# Patient Record
Sex: MALE | Race: BLACK OR AFRICAN AMERICAN | NOT HISPANIC OR LATINO | Employment: FULL TIME | URBAN - METROPOLITAN AREA
[De-identification: names, ages, dates, MRNs, and addresses within clinical notes are randomized per-mention and may not be internally consistent; named-entity substitution may affect disease eponyms.]

---

## 2017-01-10 ENCOUNTER — HOSPITAL ENCOUNTER (EMERGENCY)
Facility: OTHER | Age: 32
Discharge: HOME | End: 2017-01-10
Attending: EMERGENCY MEDICINE
Payer: COMMERCIAL

## 2017-01-10 VITALS
TEMPERATURE: 98 F | HEIGHT: 66 IN | SYSTOLIC BLOOD PRESSURE: 146 MMHG | DIASTOLIC BLOOD PRESSURE: 76 MMHG | WEIGHT: 170 LBS | RESPIRATION RATE: 18 BRPM | HEART RATE: 57 BPM | OXYGEN SATURATION: 98 % | BODY MASS INDEX: 27.32 KG/M2

## 2017-01-10 DIAGNOSIS — R19.7 DIARRHEA, UNSPECIFIED TYPE: Primary | ICD-10-CM

## 2017-01-10 DIAGNOSIS — R10.84 GENERALIZED ABDOMINAL PAIN: ICD-10-CM

## 2017-01-10 PROCEDURE — 25000003 PHARM REV CODE 250: Performed by: EMERGENCY MEDICINE

## 2017-01-10 PROCEDURE — 96374 THER/PROPH/DIAG INJ IV PUSH: CPT

## 2017-01-10 PROCEDURE — 81003 URINALYSIS AUTO W/O SCOPE: CPT

## 2017-01-10 PROCEDURE — 85025 COMPLETE CBC W/AUTO DIFF WBC: CPT

## 2017-01-10 PROCEDURE — 96361 HYDRATE IV INFUSION ADD-ON: CPT

## 2017-01-10 PROCEDURE — 82150 ASSAY OF AMYLASE: CPT

## 2017-01-10 PROCEDURE — 63600175 PHARM REV CODE 636 W HCPCS: Performed by: EMERGENCY MEDICINE

## 2017-01-10 PROCEDURE — 25500020 PHARM REV CODE 255: Performed by: EMERGENCY MEDICINE

## 2017-01-10 PROCEDURE — 99284 EMERGENCY DEPT VISIT MOD MDM: CPT | Mod: 25

## 2017-01-10 PROCEDURE — 80053 COMPREHEN METABOLIC PANEL: CPT

## 2017-01-10 RX ORDER — ONDANSETRON 4 MG/1
8 TABLET, FILM COATED ORAL EVERY 8 HOURS PRN
Qty: 14 TABLET | Refills: 0 | Status: SHIPPED | OUTPATIENT
Start: 2017-01-10 | End: 2017-04-05

## 2017-01-10 RX ORDER — MORPHINE SULFATE 10 MG/ML
5 INJECTION INTRAMUSCULAR; INTRAVENOUS; SUBCUTANEOUS
Status: COMPLETED | OUTPATIENT
Start: 2017-01-10 | End: 2017-01-10

## 2017-01-10 RX ORDER — HYOSCYAMINE SULFATE 0.125 MG
125 TABLET ORAL EVERY 4 HOURS PRN
Qty: 10 TABLET | Refills: 0 | Status: SHIPPED | OUTPATIENT
Start: 2017-01-10 | End: 2017-08-29 | Stop reason: ALTCHOICE

## 2017-01-10 RX ADMIN — IOHEXOL 100 ML: 350 INJECTION, SOLUTION INTRAVENOUS at 02:01

## 2017-01-10 RX ADMIN — SODIUM CHLORIDE 1000 ML: 0.9 INJECTION, SOLUTION INTRAVENOUS at 01:01

## 2017-01-10 RX ADMIN — MORPHINE SULFATE 5 MG: 10 INJECTION INTRAVENOUS at 01:01

## 2017-01-10 NOTE — ED PROVIDER NOTES
"Encounter Date: 1/10/2017       History     Chief Complaint   Patient presents with    Diarrhea     Review of patient's allergies indicates:   Allergen Reactions    Ibuprofen Hives     HPI Comments: 30 Y/O AAM PRESENTS WITH DIFFUSE ABD CRAMPING AND WATERY DIARRHEA x 2 DAYS.   PT DESCRIBES PAIN AS CRAMPING IN NATURE, DIFFUSE AND NON-RADIATING, 8/10.  + DECREASED PO INTAKE.  PT REPORTS "I AM ABLE TO EAT. IT JUST GOES RIGHT THROUGH ME."  PT REPORTS HE ATE [employer] FA[employer] Sunday AT 1330 AND SINCE Sunday EVENING HE HAS HAD ABD CRAMPING AND WATERY DIARRHEA.  NO N/V.  NO FEVER/CHILLS.  NO KNOWN SICK CONTACTS.  NO BACK PAIN.  NO HX OF PREVIOUS SIMILAR EPISODE IN THE PAST.  NORMAL UO.  NO FLANK PAIN. NO EXACERBATING OR RELIEVING FACTORS.  DIARRHEA IS NON-BLOODY, NO FOUL ODOR.      The history is provided by the patient. No  was used.     Past Medical History   Diagnosis Date    Asthma     Hernia      No past medical history pertinent negatives.  Past Surgical History   Procedure Laterality Date    Hernia repair       Family History   Problem Relation Age of Onset    Diabetes Mother     Hypertension Mother     Carpal tunnel syndrome Father     Liver disease Father     No Known Problems Sister     No Known Problems Brother     Allergies Daughter     Asthma Daughter     No Known Problems Son     No Known Problems Sister     No Known Problems Sister     No Known Problems Brother     No Known Problems Brother      Social History   Substance Use Topics    Smoking status: Never Smoker    Smokeless tobacco: Never Used    Alcohol use No     Review of Systems   Constitutional: Negative for chills, fatigue and fever.   HENT: Negative for congestion and sore throat.    Eyes: Negative for photophobia and visual disturbance.   Respiratory: Negative for cough and shortness of breath.    Cardiovascular: Negative for chest pain and palpitations.   Gastrointestinal: Positive for abdominal pain and " diarrhea. Negative for abdominal distention, anal bleeding, blood in stool, constipation, nausea and vomiting.   Endocrine: Negative for polydipsia and polyphagia.   Genitourinary: Negative for difficulty urinating, dysuria, flank pain, frequency, hematuria and urgency.   Musculoskeletal: Negative for back pain and neck pain.   Skin: Negative for pallor and rash.   Allergic/Immunologic: Negative for immunocompromised state.   Neurological: Negative for dizziness and headaches.   Hematological: Does not bruise/bleed easily.   Psychiatric/Behavioral: Negative for agitation and confusion.   All other systems reviewed and are negative.      Physical Exam   Initial Vitals   BP Pulse Resp Temp SpO2   01/10/17 1257 01/10/17 1257 01/10/17 1257 01/10/17 1257 01/10/17 1257   142/82 58 16 98.1 °F (36.7 °C) 96 %     Physical Exam    Nursing note and vitals reviewed.  Constitutional: He appears well-developed and well-nourished. He is not diaphoretic. No distress.   HENT:   Head: Normocephalic and atraumatic.   Mouth/Throat: Oropharynx is clear and moist.   Eyes: Conjunctivae and EOM are normal. Pupils are equal, round, and reactive to light. No scleral icterus.   Neck: Normal range of motion. Neck supple.   Cardiovascular: Normal rate, regular rhythm and normal heart sounds. Exam reveals no gallop and no friction rub.    No murmur heard.  Pulmonary/Chest: Breath sounds normal. No respiratory distress. He has no wheezes. He has no rhonchi. He has no rales. He exhibits no tenderness.   Abdominal: Soft. Bowel sounds are normal. He exhibits no distension, no ascites and no mass. There is no hepatosplenomegaly. There is tenderness. There is no rebound, no guarding, no tenderness at McBurney's point and negative Carrizales's sign. No hernia.       Musculoskeletal: Normal range of motion. He exhibits no edema or tenderness.   Lymphadenopathy:     He has no cervical adenopathy.   Neurological: He is alert and oriented to person, place, and  time. He has normal strength. No sensory deficit.   Skin: Skin is warm and dry. No rash noted. No erythema.   Psychiatric: He has a normal mood and affect. His behavior is normal. Judgment and thought content normal.         ED Course   Procedures  Labs Reviewed   POCT CBC   POCT CMP   POCT URINALYSIS DIPSTICK (CULTURE IF INDICATED)   POCT AMYLASE             Medical Decision Making:   Initial Assessment:   32 Y/O AAM WITH DIFFUSE ABD CRAMPING AND DIARRHEA x 2 DAYS.  DIARRHEA IS WATERY AND NON-BLOODY.  NO FEVER/CHILLS.  NO HX OF SIMILAR EPISODES IN THE PAST.   Differential Diagnosis:   DDX: COLITIS, DIVERTICULITIS, DEHYDRATION, ELECTROLYTE DISORDER, INFECTIOUS DIARRHEA  Clinical Tests:   Lab Tests: Ordered and Reviewed  The following lab test(s) were unremarkable: CBC and CMP  ED Management:  TX:  IVF, PAIN MEDS AND ZOFRAN  CT A/P: CT IS NEG FOR ACUTE FINDINGS  CBC, CMP, AND U/A NEG FOR ACUTE FINDINGS.     PT FEELS BETTER AND HAS NOT HAD DIARRHEA SINCE ARRIVAL.  PT WILL BE D/C HOME WITH RX FOR LEVSIN AND ZOFRAN.  PT INSTRUCTED TO F/U WITH PCP IN 2 DAYS AND RETURN TO ED IF SYMPTOMS WORSEN                   ED Course     Clinical Impression:   The primary encounter diagnosis was Diarrhea, unspecified type. A diagnosis of Generalized abdominal pain was also pertinent to this visit.    Disposition:   Disposition: Discharged  Condition: Stable       Dori White MD  01/10/17 8237

## 2017-01-10 NOTE — ED NOTES
Appearance:  Pt awake, alert & oriented to person, place & time.  Skin:  Skin warm, dry & intact.  Mucous membranes moist.  Skin turgor normal.  Respiratory:  Respirations even, non-labored.    Neurologic:  Pt moving all extremities without difficulty.  Sensation intact.     Peripheral Vascular:  All peripheral pulses present.  Abdomen:  Abdomen soft.  Tenderness to lower abdomen.

## 2017-01-10 NOTE — ED TRIAGE NOTES
Pt c/o diarrhea x 2 days.  Also having abdoiminal cramping.  Pt reports approx 6 episodes of diarrhea a day.  States stool is watery.  Denies blood in stool.  Denies vomiting. Pt has taken Percocet today and Immodium x 1 dose yesterday.

## 2017-01-10 NOTE — ED AVS SNAPSHOT
[facility] EMERGENCY DEPARTMENT  [address]  [place] [address]               Solomon Chiu Jr.   1/10/2017 12:56 PM   ED    Description:  Male : 1985   Department:  [facility] Emergency Department           Your Care was Coordinated By:     Provider Role From To    Dori White MD Attending Provider 01/10/17 1305 --      Reason for Visit     Diarrhea           Diagnoses this Visit        Comments    Diarrhea, unspecified type    -  Primary     Generalized abdominal pain           ED Disposition     None           To Do List           Follow-up Information     Follow up with Kaushik Shaw MD In 2 days.    Specialty:  Family Medicine    Contact information:    [address] [place] [address]  [phone]         These Medications        Disp Refills Start End    hyoscyamine (ANASPAZ,LEVSIN) 0.125 mg Tab 10 tablet 0 1/10/2017 2017    Take 1 tablet (125 mcg total) by mouth every 4 (four) hours as needed. - Oral    Pharmacy: [facility]  [place], [place] - [facility] Ph #: [phone]       ondansetron (ZOFRAN) 4 MG tablet 14 tablet 0 1/10/2017     Take 2 tablets (8 mg total) by mouth every 8 (eight) hours as needed for Nausea. - Oral    Pharmacy: [facility] [address] Ph #: [phone]         [facility]     [facility] On [facility] Nurse Care Line -  Assistance  Registered nurses in the [facility] Center provide clinical advisement, health education, appointment booking, and other advisory services.  Call for this free service at [phone].             Medications           Message regarding Medications     Verify the changes and/or additions to your medication regime listed below are the same as discussed with your clinician today.  If any of these changes or additions are incorrect, please notify your healthcare provider.        START taking these NEW medications         Refills    hyoscyamine (ANASPAZ,LEVSIN) 0.125 mg Tab 0    Sig: Take 1 tablet (125 mcg total) by mouth every 4 (four) hours as needed.    Class: Print    Route: Oral    ondansetron (ZOFRAN) 4 MG tablet 0    Sig: Take 2 tablets (8 mg total) by mouth every 8 (eight) hours as needed for Nausea.    Class: Print    Route: Oral      These medications were administered today        Dose Freq    sodium chloride 0.9% bolus 1,000 mL 1,000 mL Once    Sig: Inject 1,000 mLs into the vein once.    Class: Normal    Route: Intravenous    morphine injection 5 mg 5 mg ED 1 Time    Sig: Inject 0.5 mLs (5 mg total) into the vein ED 1 Time.    Class: Normal    Route: Intravenous    omnipaque 350 iohexol 100 mL 100 mL IMG once as needed    Sig: Inject 100 mLs into the vein ONCE PRN for contrast.    Class: Normal    Route: Intravenous           Verify that the below list of medications is an accurate representation of the medications you are currently taking.  If none reported, the list may be blank. If incorrect, please contact your healthcare provider. Carry this list with you in case of emergency.           Current Medications     albuterol 90 mcg/actuation inhaler Inhale 2 puffs into the lungs every 6 (six) hours as needed for Wheezing or Shortness of Breath.    albuterol-ipratropium 2.5mg-0.5mg/3mL (DUO-NEB) 0.5 mg-3 mg(2.5 mg base)/3 mL nebulizer solution Take 3 mLs by nebulization every 6 (six) hours as needed for Wheezing or Shortness of Breath.    beclomethasone (QVAR) 40 mcg/actuation Aero Inhale 1 puff into the lungs 2 (two) times daily.    hyoscyamine (ANASPAZ,LEVSIN) 0.125 mg Tab Take 1 tablet (125 mcg total) by mouth every 4 (four) hours as needed.    ondansetron (ZOFRAN) 4 MG tablet Take 2 tablets (8 mg total) by mouth every 8 (eight) hours as needed for Nausea.    triamcinolone acetonide 0.1% (KENALOG) 0.1 % Lotn Apply topically 2 (two) times daily.           Clinical Reference Information           Your Vitals Were     BP  "Pulse Temp Resp Height Weight    142/82 58 98.1 °F (36.7 °C) (Oral) 16 5' 6" (1.676 m) 77.1 kg (170 lb)    SpO2 BMI             96% 27.44 kg/m2         Allergies as of 1/10/2017        Reactions    Ibuprofen Hives      Immunizations Administered on Date of Encounter - 1/10/2017     None      ED Micro, Lab, POCT     Start Ordered       Status Ordering Provider    01/10/17 1325 01/10/17 1326  POCT CBC  Once      Acknowledged     01/10/17 1325 01/10/17 1326  POCT CMP  Once      Acknowledged     01/10/17 1325 01/10/17 1326  POCT urinalysis dipstick (Culture if indicated)  Once      Acknowledged     01/10/17 1325 01/10/17 1326  POCT amylase  Once      Acknowledged       ED Imaging Orders     Start Ordered       Status Ordering Provider    01/10/17 1325 01/10/17 1326  CT Abdomen Pelvis With Contrast  1 time imaging      Final result         Discharge Instructions         Abdominal Pain    Abdominal pain is pain in the stomach or belly area. Everyone has this pain from time to time. In many cases it goes away on its own. But abdominal pain can sometimes be due to a serious problem, such as appendicitis. So its important to know when to seek help.  Causes of abdominal pain  There are many possible causes of abdominal pain. Common causes in adults include:  · Constipation, diarrhea, or gas  · Stomach acid flowing back up into the esophagus (acid reflux or heartburn)  · Severe acid reflux, called GERD (gastroesophageal reflux disease)  · A sore in the lining of the stomach or small intestine (peptic ulcer)  · Inflammation of the gallbladder, liver, or pancreas  · Gallstones or kidney stones  · Appendicitis   · Intestinal blockage   · An internal organ pushing through a muscle or other tissue (hernia)  · Urinary tract infections  · In women, menstrual cramps, fibroids, or endometriosis  · Inflammation or infection of the intestines  Diagnosing the cause of abdominal pain  Your healthcare provider will do a physical exam help " find the cause of your pain. If needed, tests will be ordered. Belly pain has many possible causes. So it can be hard to find the reason for your pain. Giving details about your pain can help. Tell your provider where and when you feel the pain, and what makes it better or worse. Also let your provider know if you have other symptoms such as:  · Fever  · Tiredness  · Upset stomach (nausea)  · Vomiting  · Changes in bathroom habits  Treating abdominal pain  Some causes of pain need emergency medical treatment right away. These include appendicitis or a bowel blockage. Other problems can be treated with rest, fluids, or medicines. Your healthcare provider can give you specific instructions for treatment or self-care based on what is causing your pain.  If you have vomiting or diarrhea, sip water or other clear fluids. When you are ready to eat solid foods again, start with small amounts of easy-to-digest, low-fat foods. These include apple sauce, toast, or crackers.   When to seek medical care  Call 911 or go to the hospital right away if you:  · Cant pass stool and are vomiting  · Are vomiting blood or have bloody diarrhea or black, tarry diarrhea  · Have chest, neck, or shoulder pain  · Feel like you might pass out  · Have pain in your shoulder blades with nausea  · Have sudden, severe belly pain  · Have new, severe pain unlike any you have felt before  · Have a belly that is rigid, hard, and tender to touch  Call your healthcare provider if you have:  · Pain for more than 5 days  · Bloating for more than 2 days  · Diarrhea for more than 5 days  · A fever of 100.4°F (38.0°C) or higher, or as directed by your provider  · Pain that gets worse  · Weight loss for no reason  · Continued lack of appetite  · Blood in your stool  How to prevent abdominal pain  Here are some tips to help prevent abdominal pain:  · Eat smaller amounts of food at one time.  · Avoid greasy, fried, or other high-fat foods.  · Avoid foods that  give you gas.  · Exercise regularly.  · Drink plenty of fluids.  To help prevent GERD symptoms:  · Quit smoking.  · Reduce alcohol and certain foods that increase stomach acid.  · Avoid aspirin and over-the-counter pain and fever medicines (NSAIDS or nonsteroidal anti-inflammatory drugs), if possible  · Lose extra weight.  · Finish eating at least 2 hours before you go to bed or lie down.  · Raise the head of your bed.  © 8055-1051 [employer]. [address] [place] [address], [place] [address]. All rights reserved. This information is not intended as a substitute for professional medical care. Always follow your healthcare professional's instructions.          Treating Diarrhea  Diarrhea happens when you have loose, watery, or frequent bowel movements. It is a common problem with many causes. Most cases of diarrhea clear up on their own. But certain cases may need treatment. Be sure to see your healthcare provider if your symptoms do not improve within a few days.    Getting relief  Treatment of diarrhea depends on its cause. Diarrhea caused by bacterial or parasite infection is often treated with antibiotics. Diarrhea caused by other factors, such as a stomach virus, often improves with simple home treatment. The tips below may also help relieve your symptoms.  · Drink plenty of fluids. This helps prevent too much fluid loss (dehydration). Water, clear soups, and electrolyte solutions are good choices. Avoid alcohol, coffee, tea, and milk. These can irritate your intestines and make symptoms worse.  · Suck on ice chips if drinking makes you queasy.  · Return to your normal diet slowly. You may want to eat bland foods at first, such as rice and toast. Also, you may need to avoid certain foods for a while, such as dairy products. These can make symptoms worse. Ask your healthcare provider if there are any other foods you should avoid.  · If you were prescribed antibiotics, take them as directed.  · Do not take  anti-diarrhea medicines without asking your healthcare provider first.  Call your healthcare provider   Call your healthcare provider if you have any of the following:   · A fever of 100.4°F (38.0°C) or higher, or as directed by your healthcare provider  · Severe pain  · Worsening diarrhea or diarrhea for more than 2 days    Uncertain Causes of Diarrhea (Adult)    Diarrhea is when stools are loose and watery. This can be caused by:  Viral infections  Bacterial infections  Food poisoning  Parasites  Irritable bowel syndrome (IBS)  Inflammatory bowel diseases such as ulcerative colitis, Crohn's disease, and celiac disease  Food intolerance, such as to lactose, the sugar found in milk and milk products  Reaction to medicines like antibiotics, laxatives, cancer drugs, and antacids  Along with diarrhea, you may also have:  Abdominal pain and cramping  Nausea and vomiting  Loss of bowel control  Fever and chills  Bloody stools  In some cases, antibiotics may help to treat diarrhea. You may have a stool sample test. This is done to see what is causing your diarrhea, and if antibiotics will help treat it. The results of a stool sample test may take up to 2 days. The healthcare provider may not give you antibiotics until he or she has the stool test results.  Diarrhea can cause dehydration. This is the loss of too much water and other fluids from the body. When this occurs, body fluid must be replaced. This can be done with oral rehydration solutions. Oral rehydration solutions are available at drugstores and grocery stores without a prescription.  Home care  Follow all instructions given by your healthcare provider. Rest at home for the next 24 hours, or until you feel better. Avoid caffeine, tobacco, and alcohol. These can make diarrhea, cramping, and pain worse.  If taking medicines:  Dont take over-the-counter diarrhea or nausea medicines unless your healthcare provider tells you to.  You may use acetaminophen or NSAID  medicines like ibuprofen or naproxen to reduce pain and fever. Dont use these if you have chronic liver or kidney disease, or ever had a stomach ulcer or gastrointestinal bleeding. Don't use NSAID medicines if you are already taking one for another condition (like arthritis) or are on daily aspirin therapy (such as for heart disease or after a stroke). Talk with your healthcare provider first.  If antibiotics were prescribed, be sure you take them until they are finished. Dont stop taking them even when you feel better. Antibiotics must be taken as a full course.  To prevent the spread of illness:  Remember that washing with soap and water and using alcohol-based  is the best way to prevent the spread of infection.  Clean the toilet after each use.  Wash your hands before eating.  Wash your hands before and after preparing food. Keep in mind that people with diarrhea or vomiting should not prepare food for others.  Wash your hands after using cutting boards, countertops, and knives that have been in contact with raw foods.  Wash and then peel fruits and vegetables.  Keep uncooked meats away from cooked and ready-to-eat foods.  Use a food thermometer when cooking. Cook poultry to at least 165°F (74°C). Cook ground meat (beef, veal, pork, lamb) to at least 160°F (71°C). Cook fresh beef, veal, lamb, and pork to at least 145°F (63°C).  Dont eat raw or undercooked eggs (poached or guero side up), poultry, meat, or unpasteurized milk and juices.  Food and drinks  The main goal while treating vomiting or diarrhea is to prevent dehydration. This is done by taking small amounts of liquids often.  Keep in mind that liquids are more important than food right now.  Drink only small amounts of liquids at a time.  Dont force yourself to eat, especially if you are having cramping, vomiting, or diarrhea. Dont eat large amounts at a time, even if you are hungry.  If you eat, avoid fatty, greasy, spicy, or fried  foods.  Dont eat dairy foods or drink milk if you have diarrhea. These can make diarrhea worse.  During the first 24 hours you can try:  Oral rehydration solutions. Do not use sports drinks. They have too much sugar and not enough electrolytes.  Soft drinks without caffeine  Ginger ale  Water (plain or flavored)  Decaf tea or coffee  Clear broth, consommé, or bouillon  Gelatin, popsicles, or frozen fruit juice bars  The second 24 hours, if you are feeling better, you can add:  Hot cereal, plain toast, bread, rolls, or crackers  Plain noodles, rice, mashed potatoes, chicken noodle soup, or rice soup  Unsweetened canned fruit (no pineapple)  Bananas  As you recover:  Limit fat intake to less than 15 grams per day. Dont eat margarine, butter, oils, mayonnaise, sauces, gravies, fried foods, peanut butter, meat, poultry, or fish.  Limit fiber. Dont eat raw or cooked vegetables, fresh fruits except bananas, or bran cereals.  Limit caffeine and chocolate.  Limit dairy.  Dont use spices or seasonings except salt.  Go back to your normal diet over time, as you feel better and your symptoms improve.  If the symptoms come back, go back to a simple diet or clear liquids.  Follow-up care  Follow up with your healthcare provider, or as advised. If a stool sample was taken or cultures were done, call the healthcare provider for the results as instructed.  Call 911  Call 911 if you have any of these symptoms:  Trouble breathing  Confusion  Extreme drowsiness or trouble walking  Loss of consciousness  Rapid heart rate  Chest pain  Stiff neck  Seizure  When to seek medical advice  Call your healthcare provider right away if any of these occur:  Abdominal pain that gets worse  Constant lower right abdominal pain  Continued vomiting and inability to keep liquids down  Diarrhea more than 5 times a day  Blood in vomit or stool  Dark urine or no urine for 8 hours, dry mouth and tongue, tiredness, weakness, or  dizziness  Drowsiness  New rash  You dont get better in 2 to 3 days  Fever of 100.4°F (38°C) or higher that doesnt get lower with medicine  © 0992-4403 [employer]. [address] [place] [address]. All rights reserved. This information is not intended as a substitute for professional medical care. Always follow your healthcare professional's instructions.      ·   · Bloody vomit or stool  · Signs of dehydration (dizziness, dry mouth and tongue, rapid pulse, dark urine)  © 9989-8365 [employer]. [address] [place] [address]. All rights reserved. This information is not intended as a substitute for professional medical care. Always follow your healthcare professional's instructions.          Your Scheduled Appointments     Sotero 10, 2017  3:30 PM CST   Urgent Care with Madan Joe MD   [facility] ([address] [address]   [phone]              [facility] Sign-Up     Activating your [facility] account is as easy as 1-2-3!     1) Visit [email or web address], select Sign Up Now, enter this activation code and your date of birth, then select Next.  [email or web address]  Expires: 2/13/2017 10:57 AM      2) Create a username and password to use when you visit [facility] in the future and select a security question in case you lose your password and select Next.    3) Enter your e-mail address and click Sign Up!    Additional Information  If you have questions, please e-mail [email or web address] or call [phone] to talk to our [facility] staff. Remember, [facility] is NOT to be used for urgent needs. For medical emergencies, dial 911.          [facility] Emergency Department complies with applicable Federal civil rights laws and does not discriminate on the basis of race, color, national origin, age, disability, or sex.        Language Assistance Services     ATTENTION: Language assistance services are available,  free of charge. Please call [phone].      ATENCIÓN: Si habla español, tiene a howard disposición servicios gratuitos de asistencia lingüística. Llame al [phone].     CHÚ Ý: N?u b?n nói Ti?ng Vi?t, có các d?ch v? h? tr? ngôn ng? mi?n phí dành cho b?n. G?i s? [phone].

## 2017-01-10 NOTE — DISCHARGE INSTRUCTIONS
Abdominal Pain    Abdominal pain is pain in the stomach or belly area. Everyone has this pain from time to time. In many cases it goes away on its own. But abdominal pain can sometimes be due to a serious problem, such as appendicitis. So its important to know when to seek help.  Causes of abdominal pain  There are many possible causes of abdominal pain. Common causes in adults include:  · Constipation, diarrhea, or gas  · Stomach acid flowing back up into the esophagus (acid reflux or heartburn)  · Severe acid reflux, called GERD (gastroesophageal reflux disease)  · A sore in the lining of the stomach or small intestine (peptic ulcer)  · Inflammation of the gallbladder, liver, or pancreas  · Gallstones or kidney stones  · Appendicitis   · Intestinal blockage   · An internal organ pushing through a muscle or other tissue (hernia)  · Urinary tract infections  · In women, menstrual cramps, fibroids, or endometriosis  · Inflammation or infection of the intestines  Diagnosing the cause of abdominal pain  Your healthcare provider will do a physical exam help find the cause of your pain. If needed, tests will be ordered. Belly pain has many possible causes. So it can be hard to find the reason for your pain. Giving details about your pain can help. Tell your provider where and when you feel the pain, and what makes it better or worse. Also let your provider know if you have other symptoms such as:  · Fever  · Tiredness  · Upset stomach (nausea)  · Vomiting  · Changes in bathroom habits  Treating abdominal pain  Some causes of pain need emergency medical treatment right away. These include appendicitis or a bowel blockage. Other problems can be treated with rest, fluids, or medicines. Your healthcare provider can give you specific instructions for treatment or self-care based on what is causing your pain.  If you have vomiting or diarrhea, sip water or other clear fluids. When you are ready to eat solid foods again,  start with small amounts of easy-to-digest, low-fat foods. These include apple sauce, toast, or crackers.   When to seek medical care  Call 911 or go to the hospital right away if you:  · Cant pass stool and are vomiting  · Are vomiting blood or have bloody diarrhea or black, tarry diarrhea  · Have chest, neck, or shoulder pain  · Feel like you might pass out  · Have pain in your shoulder blades with nausea  · Have sudden, severe belly pain  · Have new, severe pain unlike any you have felt before  · Have a belly that is rigid, hard, and tender to touch  Call your healthcare provider if you have:  · Pain for more than 5 days  · Bloating for more than 2 days  · Diarrhea for more than 5 days  · A fever of 100.4°F (38.0°C) or higher, or as directed by your provider  · Pain that gets worse  · Weight loss for no reason  · Continued lack of appetite  · Blood in your stool  How to prevent abdominal pain  Here are some tips to help prevent abdominal pain:  · Eat smaller amounts of food at one time.  · Avoid greasy, fried, or other high-fat foods.  · Avoid foods that give you gas.  · Exercise regularly.  · Drink plenty of fluids.  To help prevent GERD symptoms:  · Quit smoking.  · Reduce alcohol and certain foods that increase stomach acid.  · Avoid aspirin and over-the-counter pain and fever medicines (NSAIDS or nonsteroidal anti-inflammatory drugs), if possible  · Lose extra weight.  · Finish eating at least 2 hours before you go to bed or lie down.  · Raise the head of your bed.  © 7958-6831 [employer]. [address] [place] [address], [place] [address]. All rights reserved. This information is not intended as a substitute for professional medical care. Always follow your healthcare professional's instructions.          Treating Diarrhea  Diarrhea happens when you have loose, watery, or frequent bowel movements. It is a common problem with many causes. Most cases of diarrhea clear up on their own. But certain cases  may need treatment. Be sure to see your healthcare provider if your symptoms do not improve within a few days.    Getting relief  Treatment of diarrhea depends on its cause. Diarrhea caused by bacterial or parasite infection is often treated with antibiotics. Diarrhea caused by other factors, such as a stomach virus, often improves with simple home treatment. The tips below may also help relieve your symptoms.  · Drink plenty of fluids. This helps prevent too much fluid loss (dehydration). Water, clear soups, and electrolyte solutions are good choices. Avoid alcohol, coffee, tea, and milk. These can irritate your intestines and make symptoms worse.  · Suck on ice chips if drinking makes you queasy.  · Return to your normal diet slowly. You may want to eat bland foods at first, such as rice and toast. Also, you may need to avoid certain foods for a while, such as dairy products. These can make symptoms worse. Ask your healthcare provider if there are any other foods you should avoid.  · If you were prescribed antibiotics, take them as directed.  · Do not take anti-diarrhea medicines without asking your healthcare provider first.  Call your healthcare provider   Call your healthcare provider if you have any of the following:   · A fever of 100.4°F (38.0°C) or higher, or as directed by your healthcare provider  · Severe pain  · Worsening diarrhea or diarrhea for more than 2 days    Uncertain Causes of Diarrhea (Adult)    Diarrhea is when stools are loose and watery. This can be caused by:  Viral infections  Bacterial infections  Food poisoning  Parasites  Irritable bowel syndrome (IBS)  Inflammatory bowel diseases such as ulcerative colitis, Crohn's disease, and celiac disease  Food intolerance, such as to lactose, the sugar found in milk and milk products  Reaction to medicines like antibiotics, laxatives, cancer drugs, and antacids  Along with diarrhea, you may also have:  Abdominal pain and cramping  Nausea and  vomiting  Loss of bowel control  Fever and chills  Bloody stools  In some cases, antibiotics may help to treat diarrhea. You may have a stool sample test. This is done to see what is causing your diarrhea, and if antibiotics will help treat it. The results of a stool sample test may take up to 2 days. The healthcare provider may not give you antibiotics until he or she has the stool test results.  Diarrhea can cause dehydration. This is the loss of too much water and other fluids from the body. When this occurs, body fluid must be replaced. This can be done with oral rehydration solutions. Oral rehydration solutions are available at drugstores and grocery stores without a prescription.  Home care  Follow all instructions given by your healthcare provider. Rest at home for the next 24 hours, or until you feel better. Avoid caffeine, tobacco, and alcohol. These can make diarrhea, cramping, and pain worse.  If taking medicines:  Dont take over-the-counter diarrhea or nausea medicines unless your healthcare provider tells you to.  You may use acetaminophen or NSAID medicines like ibuprofen or naproxen to reduce pain and fever. Dont use these if you have chronic liver or kidney disease, or ever had a stomach ulcer or gastrointestinal bleeding. Don't use NSAID medicines if you are already taking one for another condition (like arthritis) or are on daily aspirin therapy (such as for heart disease or after a stroke). Talk with your healthcare provider first.  If antibiotics were prescribed, be sure you take them until they are finished. Dont stop taking them even when you feel better. Antibiotics must be taken as a full course.  To prevent the spread of illness:  Remember that washing with soap and water and using alcohol-based  is the best way to prevent the spread of infection.  Clean the toilet after each use.  Wash your hands before eating.  Wash your hands before and after preparing food. Keep in mind that  people with diarrhea or vomiting should not prepare food for others.  Wash your hands after using cutting boards, countertops, and knives that have been in contact with raw foods.  Wash and then peel fruits and vegetables.  Keep uncooked meats away from cooked and ready-to-eat foods.  Use a food thermometer when cooking. Cook poultry to at least 165°F (74°C). Cook ground meat (beef, veal, pork, lamb) to at least 160°F (71°C). Cook fresh beef, veal, lamb, and pork to at least 145°F (63°C).  Dont eat raw or undercooked eggs (poached or guero side up), poultry, meat, or unpasteurized milk and juices.  Food and drinks  The main goal while treating vomiting or diarrhea is to prevent dehydration. This is done by taking small amounts of liquids often.  Keep in mind that liquids are more important than food right now.  Drink only small amounts of liquids at a time.  Dont force yourself to eat, especially if you are having cramping, vomiting, or diarrhea. Dont eat large amounts at a time, even if you are hungry.  If you eat, avoid fatty, greasy, spicy, or fried foods.  Dont eat dairy foods or drink milk if you have diarrhea. These can make diarrhea worse.  During the first 24 hours you can try:  Oral rehydration solutions. Do not use sports drinks. They have too much sugar and not enough electrolytes.  Soft drinks without caffeine  Ginger ale  Water (plain or flavored)  Decaf tea or coffee  Clear broth, consommé, or bouillon  Gelatin, popsicles, or frozen fruit juice bars  The second 24 hours, if you are feeling better, you can add:  Hot cereal, plain toast, bread, rolls, or crackers  Plain noodles, rice, mashed potatoes, chicken noodle soup, or rice soup  Unsweetened canned fruit (no pineapple)  Bananas  As you recover:  Limit fat intake to less than 15 grams per day. Dont eat margarine, butter, oils, mayonnaise, sauces, gravies, fried foods, peanut butter, meat, poultry, or fish.  Limit fiber. Dont eat raw or cooked  vegetables, fresh fruits except bananas, or bran cereals.  Limit caffeine and chocolate.  Limit dairy.  Dont use spices or seasonings except salt.  Go back to your normal diet over time, as you feel better and your symptoms improve.  If the symptoms come back, go back to a simple diet or clear liquids.  Follow-up care  Follow up with your healthcare provider, or as advised. If a stool sample was taken or cultures were done, call the healthcare provider for the results as instructed.  Call 911  Call 911 if you have any of these symptoms:  Trouble breathing  Confusion  Extreme drowsiness or trouble walking  Loss of consciousness  Rapid heart rate  Chest pain  Stiff neck  Seizure  When to seek medical advice  Call your healthcare provider right away if any of these occur:  Abdominal pain that gets worse  Constant lower right abdominal pain  Continued vomiting and inability to keep liquids down  Diarrhea more than 5 times a day  Blood in vomit or stool  Dark urine or no urine for 8 hours, dry mouth and tongue, tiredness, weakness, or dizziness  Drowsiness  New rash  You dont get better in 2 to 3 days  Fever of 100.4°F (38°C) or higher that doesnt get lower with medicine  © 3620-1208 [employer]. [address] [place] [address]. All rights reserved. This information is not intended as a substitute for professional medical care. Always follow your healthcare professional's instructions.      ·   · Bloody vomit or stool  · Signs of dehydration (dizziness, dry mouth and tongue, rapid pulse, dark urine)  © 1950-9899 [employer]. [address] [place] [address]. All rights reserved. This information is not intended as a substitute for professional medical care. Always follow your healthcare professional's instructions.

## 2017-01-20 ENCOUNTER — HOSPITAL ENCOUNTER (EMERGENCY)
Facility: OTHER | Age: 32
Discharge: HOME | End: 2017-01-20
Attending: EMERGENCY MEDICINE
Payer: COMMERCIAL

## 2017-01-20 VITALS
RESPIRATION RATE: 17 BRPM | HEIGHT: 66 IN | HEART RATE: 75 BPM | TEMPERATURE: 98 F | WEIGHT: 181.69 LBS | OXYGEN SATURATION: 100 % | SYSTOLIC BLOOD PRESSURE: 136 MMHG | BODY MASS INDEX: 29.2 KG/M2 | DIASTOLIC BLOOD PRESSURE: 65 MMHG

## 2017-01-20 DIAGNOSIS — H00.16 CHALAZION, LEFT: Primary | ICD-10-CM

## 2017-01-20 PROCEDURE — 99283 EMERGENCY DEPT VISIT LOW MDM: CPT

## 2017-01-20 RX ORDER — GENTAMICIN SULFATE 0.3 %
0.5 OINTMENT (GRAM) OPHTHALMIC (EYE) 3 TIMES DAILY
Qty: 3.5 G | Refills: 0 | Status: SHIPPED | OUTPATIENT
Start: 2017-01-20 | End: 2017-01-30

## 2017-01-20 NOTE — ED AVS SNAPSHOT
[facility] EMERGENCY DEPARTMENT  [address]  [facility] [address]               Solomon Busby Jr.   2017  3:21 PM   ED    Description:  Male : 1985   Department:  [facility] Emergency Department           Your Care was Coordinated By:     Provider Role From To    Micha Mtz MD Attending Provider 17 1526 --      Reason for Visit     Eye Pain           Diagnoses this Visit        Comments    Chalazion, left    -  Primary       ED Disposition     ED Disposition Condition Comment    Discharge             To Do List           Follow-up Information     Follow up with Kaushik Shaw MD In 1 week(s).    Specialty:  Family Medicine    Contact information:    [address] [facility] [address]  [phone]          Follow up with Kaushik Shaw MD In 1 week.    Specialty:  Family Medicine    Contact information:    [address] [place] [address]  [phone]        [facility]     [facility] On Call Nurse Care Line -  Assistance  Registered nurses in the [facility] On Call Center provide clinical advisement, health education, appointment booking, and other advisory services.  Call for this free service at [phone].             Medications           Message regarding Medications     Verify the changes and/or additions to your medication regime listed below are the same as discussed with your clinician today.  If any of these changes or additions are incorrect, please notify your healthcare provider.             Verify that the below list of medications is an accurate representation of the medications you are currently taking.  If none reported, the list may be blank. If incorrect, please contact your healthcare provider. Carry this list with you in case of emergency.           Current Medications     albuterol 90 mcg/actuation inhaler Inhale 2 puffs into the lungs every 6 (six) hours as needed for Wheezing or Shortness of Breath.    hyoscyamine (ANASPAZ,LEVSIN)  "0.125 mg Tab Take 1 tablet (125 mcg total) by mouth every 4 (four) hours as needed.    ondansetron (ZOFRAN) 4 MG tablet Take 2 tablets (8 mg total) by mouth every 8 (eight) hours as needed for Nausea.    albuterol-ipratropium 2.5mg-0.5mg/3mL (DUO-NEB) 0.5 mg-3 mg(2.5 mg base)/3 mL nebulizer solution Take 3 mLs by nebulization every 6 (six) hours as needed for Wheezing or Shortness of Breath.    beclomethasone (QVAR) 40 mcg/actuation Aero Inhale 1 puff into the lungs 2 (two) times daily.    triamcinolone acetonide 0.1% (KENALOG) 0.1 % Lotn Apply topically 2 (two) times daily.           Clinical Reference Information           Your Vitals Were     BP Pulse Temp Resp Height Weight    136/65 (BP Location: Left arm, Patient Position: Sitting) 75 98.2 °F (36.8 °C) (Oral) 17 5' 6" (1.676 m) 82.4 kg (181 lb 10.5 oz)    SpO2 BMI             100% 29.32 kg/m2         Allergies as of 1/20/2017        Reactions    Ibuprofen Hives      Immunizations Administered on Date of Encounter - 1/20/2017     None      ED Micro, Lab, POCT     None      ED Imaging Orders     None        Discharge Instructions           Chalazion    A chalazion is a blocked, swollen oil gland in the eyelid. The eyelids have oil glands that lubricate the inside of the lids. If a gland becomes blocked, the oil builds up and causes the skin to swell.  A chalazion can take several weeks to grow. It can vary in size. It may appear on the inside or outside of the lid. In most cases, it occurs on the upper lid. The skin may be a normal color or may be red. A chalazion is usually not painful, but it can cause discomfort, tenderness, sensitivity to light, eye discharge, and increased tearing.  A chalazion usually lasts from a few weeks to a month. It often goes away on its own. A chalazion can be mistaken for a sty (infection of an oil gland) because they both appear on the eyelid.  Why a chalazion forms  Its often unclear why a chalazion appears. However, a " chalazion can develop when you have any of the following conditions:  · Chronic blepharitis, when eyelids become irritated  · Acne rosacea  · Seborrhea  · Tuberculosis  · Viral infection  Home care  If your healthcare provider finds that a chalazion is infected, he or she may prescribe an antibiotic drop or ointment. Use the medicine as directed.  · Wash your hands carefully with soap and warm water before and after caring for your eye(s). This is to help prevent infection.  · Apply a warm, moist towel or compress for 10 to 15 minutes, 3 to 4 times a day. This will reduce the swelling and soften the hardened oils blocking the duct.  · Massage the area gently after applying the warm compress to help drain the chalazion. Or follow the directions given by your healthcare provider.  · Do not try to pop or squeeze the chalazion.  · Do not wear eye makeup until the chalazion has healed, or follow your healthcare providers directions.  · Do not wear contact lenses until the chalazion has healed, or follow your healthcare providers directions.  · Once a day, with eyes closed, clean your eyelids with baby shampoo or a moist eyelid cleansing wipe. This is to help reduce clogging of the duct, as well as help prevent a chalazion from returning. Ask your health care provider about products to clean your eyelids.  Follow-up care  Follow up with your health care provider, or as advised. If the chalazion does not heal in 4 weeks, you may be referred to a healthcare provider who specializes in eye care (an optometrist or ophthalmologist) for further evaluation and treatment. You may also be referred to an eye specialist if you have a large chalazion.  When to seek medical advice  Call your health care provider right away if any of these occur:  · Chalazion returns to the same area repeatedly  · Existing symptoms (such as pain, warmth, redness, and drainage) get worse  · New symptoms appear, such as eye pain, warmth or redness  around the eye, eye drainage, or both the upper and lower lids of the same eye swell  · You have visual changes or blurred vision  · You have a headache that persists  · You have a fever of 100.4ºF (38ºC) or higher, or as directed by your healthcare provider  © 6414-3713 [employer]. [address] [place] [address]. All rights reserved. This information is not intended as a substitute for professional medical care. Always follow your healthcare professional's instructions.          Your Scheduled Appointments     Jan 25, 2017  3:40 PM CST   Physical with Kaushik Shaw MD   [facility]    [address] [place] [id number] [phone]              [facility] Sign-Up     Activating your [facility] account is as easy as 1-2-3!     1) Visit [email or web address], select Sign Up Now, enter this activation code and your date of birth, then select Next.  [email or web address]  Expires: 2/13/2017 10:57 AM      2) Create a username and password to use when you visit [facility] in the future and select a security question in case you lose your password and select Next.    3) Enter your e-mail address and click Sign Up!    Additional Information  If you have questions, please e-mail [email or web address] or call [phone] to talk to our [facility] staff. Remember, [facility] is NOT to be used for urgent needs. For medical emergencies, dial 911.          [facility] Emergency Department complies with applicable Federal civil rights laws and does not discriminate on the basis of race, color, national origin, age, disability, or sex.        Language Assistance Services     ATTENTION: Language assistance services are available, free of charge. Please call [phone].      ATENCIÓN: Si habla español, tiene a howard disposición servicios gratuitos de asistencia lingüística. Llame al [phone].     CHÚ Ý: N?u b?n nói Ti?ng Vi?t, có các d?ch v? h? tr? ngôn ng? mi?n phí dành cho b?n.  G?i s? [phone].

## 2017-01-20 NOTE — ED NOTES
Pt reports with L inner eye swelling. Pt reports that the swelling began 1hr PTA. Denies any drainage, burning, or itching. Reports slight pain when blinking.

## 2017-01-20 NOTE — DISCHARGE INSTRUCTIONS
Chalazion    A chalazion is a blocked, swollen oil gland in the eyelid. The eyelids have oil glands that lubricate the inside of the lids. If a gland becomes blocked, the oil builds up and causes the skin to swell.  A chalazion can take several weeks to grow. It can vary in size. It may appear on the inside or outside of the lid. In most cases, it occurs on the upper lid. The skin may be a normal color or may be red. A chalazion is usually not painful, but it can cause discomfort, tenderness, sensitivity to light, eye discharge, and increased tearing.  A chalazion usually lasts from a few weeks to a month. It often goes away on its own. A chalazion can be mistaken for a sty (infection of an oil gland) because they both appear on the eyelid.  Why a chalazion forms  Its often unclear why a chalazion appears. However, a chalazion can develop when you have any of the following conditions:  · Chronic blepharitis, when eyelids become irritated  · Acne rosacea  · Seborrhea  · Tuberculosis  · Viral infection  Home care  If your healthcare provider finds that a chalazion is infected, he or she may prescribe an antibiotic drop or ointment. Use the medicine as directed.  · Wash your hands carefully with soap and warm water before and after caring for your eye(s). This is to help prevent infection.  · Apply a warm, moist towel or compress for 10 to 15 minutes, 3 to 4 times a day. This will reduce the swelling and soften the hardened oils blocking the duct.  · Massage the area gently after applying the warm compress to help drain the chalazion. Or follow the directions given by your healthcare provider.  · Do not try to pop or squeeze the chalazion.  · Do not wear eye makeup until the chalazion has healed, or follow your healthcare providers directions.  · Do not wear contact lenses until the chalazion has healed, or follow your healthcare providers directions.  · Once a day, with eyes closed, clean your eyelids with baby  shampoo or a moist eyelid cleansing wipe. This is to help reduce clogging of the duct, as well as help prevent a chalazion from returning. Ask your health care provider about products to clean your eyelids.  Follow-up care  Follow up with your health care provider, or as advised. If the chalazion does not heal in 4 weeks, you may be referred to a healthcare provider who specializes in eye care (an optometrist or ophthalmologist) for further evaluation and treatment. You may also be referred to an eye specialist if you have a large chalazion.  When to seek medical advice  Call your health care provider right away if any of these occur:  · Chalazion returns to the same area repeatedly  · Existing symptoms (such as pain, warmth, redness, and drainage) get worse  · New symptoms appear, such as eye pain, warmth or redness around the eye, eye drainage, or both the upper and lower lids of the same eye swell  · You have visual changes or blurred vision  · You have a headache that persists  · You have a fever of 100.4ºF (38ºC) or higher, or as directed by your healthcare provider  © 0815-3446 [employer]. [address] [place] [address], [place] [address]. All rights reserved. This information is not intended as a substitute for professional medical care. Always follow your healthcare professional's instructions.

## 2017-01-20 NOTE — ED PROVIDER NOTES
Encounter Date: 1/20/2017       History     Chief Complaint   Patient presents with    Eye Pain     reports noticing eye swelling to L eye within last hour, reports pain but denies itching, burning, or drainage     Review of patient's allergies indicates:   Allergen Reactions    Ibuprofen Hives     Patient is a 31 y.o. male presenting with the following complaint: eye pain. The history is provided by the patient.   Eye Pain    This is a new problem. The current episode started today. The problem occurs constantly. The problem has been unchanged. The left eye is affected. There was no injury mechanism. The pain is at a severity of 2/10. There is no history of trauma to the eye. There is no known exposure to pink eye. Pertinent negatives include no numbness, no blurred vision, no decreased vision, no discharge, no double vision, no foreign body sensation, no photophobia, no eye redness, no nausea, no vomiting, no tingling, no weakness and no itching. He has tried nothing for the symptoms.     Past Medical History   Diagnosis Date    Asthma     Hernia      No past medical history pertinent negatives.  Past Surgical History   Procedure Laterality Date    Hernia repair       Family History   Problem Relation Age of Onset    Diabetes Mother     Hypertension Mother     Carpal tunnel syndrome Father     Liver disease Father     No Known Problems Sister     No Known Problems Brother     Allergies Daughter     Asthma Daughter     No Known Problems Son     No Known Problems Sister     No Known Problems Sister     No Known Problems Brother     No Known Problems Brother      Social History   Substance Use Topics    Smoking status: Never Smoker    Smokeless tobacco: Never Used    Alcohol use No     Review of Systems   Constitutional: Negative.    HENT: Negative.    Eyes: Positive for pain. Negative for blurred vision, double vision, photophobia, discharge and redness.   Respiratory: Negative.     Cardiovascular: Negative.    Gastrointestinal: Negative.  Negative for nausea and vomiting.   Endocrine: Negative.    Genitourinary: Negative.    Musculoskeletal: Negative.    Skin: Negative.  Negative for itching.   Allergic/Immunologic: Negative.    Neurological: Negative.  Negative for tingling, weakness and numbness.   Hematological: Negative.    Psychiatric/Behavioral: Negative.    All other systems reviewed and are negative.      Physical Exam   Initial Vitals   BP Pulse Resp Temp SpO2   01/20/17 1522 01/20/17 1522 01/20/17 1522 01/20/17 1522 01/20/17 1522   136/65 75 17 98.2 °F (36.8 °C) 100 %     Physical Exam    Nursing note and vitals reviewed.  Constitutional: Vital signs are normal. He appears well-developed. He is active and cooperative.   HENT:   Head: Normocephalic and atraumatic.   Eyes: Conjunctivae and EOM are normal. Pupils are equal, round, and reactive to light. Right eye exhibits hordeolum.       Neck: Trachea normal and full passive range of motion without pain. Neck supple. No thyroid mass present.   Cardiovascular: Normal rate, regular rhythm, S1 normal, S2 normal, normal heart sounds, intact distal pulses and normal pulses.   Abdominal: Soft. Normal appearance, normal aorta and bowel sounds are normal.   Musculoskeletal: Normal range of motion.   Lymphadenopathy:     He has no axillary adenopathy.   Neurological: He is alert and oriented to person, place, and time.   Skin: Skin is warm, dry and intact.   Psychiatric: He has a normal mood and affect. His speech is normal and behavior is normal. Judgment and thought content normal. Cognition and memory are normal.         ED Course   Procedures  Labs Reviewed - No data to display                            ED Course     Clinical Impression:   The encounter diagnosis was Benjy, left.          Micha Mtz MD  01/20/17 8470

## 2017-03-14 ENCOUNTER — LAB VISIT (OUTPATIENT)
Dept: LAB | Facility: HOSPITAL | Age: 32
End: 2017-03-14
Attending: FAMILY MEDICINE
Payer: COMMERCIAL

## 2017-03-14 ENCOUNTER — OFFICE VISIT (OUTPATIENT)
Dept: FAMILY MEDICINE | Facility: CLINIC | Age: 32
End: 2017-03-14
Payer: COMMERCIAL

## 2017-03-14 VITALS
HEART RATE: 80 BPM | SYSTOLIC BLOOD PRESSURE: 132 MMHG | WEIGHT: 179.69 LBS | BODY MASS INDEX: 28.88 KG/M2 | TEMPERATURE: 98 F | OXYGEN SATURATION: 98 % | HEIGHT: 66 IN | DIASTOLIC BLOOD PRESSURE: 80 MMHG

## 2017-03-14 DIAGNOSIS — Z00.00 ROUTINE PHYSICAL EXAMINATION: ICD-10-CM

## 2017-03-14 DIAGNOSIS — Z00.00 ROUTINE PHYSICAL EXAMINATION: Primary | ICD-10-CM

## 2017-03-14 DIAGNOSIS — R20.0 BILATERAL HAND NUMBNESS: ICD-10-CM

## 2017-03-14 LAB
ALBUMIN SERPL BCP-MCNC: 4.1 G/DL
ALP SERPL-CCNC: 60 U/L
ALT SERPL W/O P-5'-P-CCNC: 41 U/L
ANION GAP SERPL CALC-SCNC: 6 MMOL/L
AST SERPL-CCNC: 32 U/L
BASOPHILS # BLD AUTO: 0.1 K/UL
BASOPHILS NFR BLD: 1.2 %
BILIRUB SERPL-MCNC: 0.3 MG/DL
BUN SERPL-MCNC: 9 MG/DL
CALCIUM SERPL-MCNC: 9.4 MG/DL
CHLORIDE SERPL-SCNC: 105 MMOL/L
CHOLEST/HDLC SERPL: 3.9 {RATIO}
CK SERPL-CCNC: 482 U/L
CO2 SERPL-SCNC: 27 MMOL/L
CREAT SERPL-MCNC: 1.2 MG/DL
DIFFERENTIAL METHOD: ABNORMAL
EOSINOPHIL # BLD AUTO: 0.3 K/UL
EOSINOPHIL NFR BLD: 2.9 %
ERYTHROCYTE [DISTWIDTH] IN BLOOD BY AUTOMATED COUNT: 13.2 %
EST. GFR  (AFRICAN AMERICAN): >60 ML/MIN/1.73 M^2
EST. GFR  (NON AFRICAN AMERICAN): >60 ML/MIN/1.73 M^2
GLUCOSE SERPL-MCNC: 90 MG/DL
HCT VFR BLD AUTO: 46.4 %
HDL/CHOLESTEROL RATIO: 25.5 %
HDLC SERPL-MCNC: 184 MG/DL
HDLC SERPL-MCNC: 47 MG/DL
HGB BLD-MCNC: 16 G/DL
LDLC SERPL CALC-MCNC: 112 MG/DL
LYMPHOCYTES # BLD AUTO: 5 K/UL
LYMPHOCYTES NFR BLD: 58.7 %
MCH RBC QN AUTO: 27.5 PG
MCHC RBC AUTO-ENTMCNC: 34.5 %
MCV RBC AUTO: 80 FL
MONOCYTES # BLD AUTO: 0.6 K/UL
MONOCYTES NFR BLD: 7.1 %
NEUTROPHILS # BLD AUTO: 2.6 K/UL
NEUTROPHILS NFR BLD: 30 %
NONHDLC SERPL-MCNC: 137 MG/DL
PLATELET # BLD AUTO: 309 K/UL
PMV BLD AUTO: 10.3 FL
POTASSIUM SERPL-SCNC: 3.8 MMOL/L
PROT SERPL-MCNC: 7.8 G/DL
RBC # BLD AUTO: 5.82 M/UL
SODIUM SERPL-SCNC: 138 MMOL/L
T4 FREE SERPL-MCNC: 0.87 NG/DL
TRIGL SERPL-MCNC: 125 MG/DL
TSH SERPL DL<=0.005 MIU/L-ACNC: 2 UIU/ML
WBC # BLD AUTO: 8.5 K/UL

## 2017-03-14 PROCEDURE — 85025 COMPLETE CBC W/AUTO DIFF WBC: CPT

## 2017-03-14 PROCEDURE — 99999 PR PBB SHADOW E&M-EST. PATIENT-LVL III: CPT | Mod: PBBFAC,,, | Performed by: FAMILY MEDICINE

## 2017-03-14 PROCEDURE — 80053 COMPREHEN METABOLIC PANEL: CPT

## 2017-03-14 PROCEDURE — 82550 ASSAY OF CK (CPK): CPT

## 2017-03-14 PROCEDURE — 84443 ASSAY THYROID STIM HORMONE: CPT

## 2017-03-14 PROCEDURE — 36415 COLL VENOUS BLD VENIPUNCTURE: CPT | Mod: PO

## 2017-03-14 PROCEDURE — 99395 PREV VISIT EST AGE 18-39: CPT | Mod: S$GLB,,, | Performed by: FAMILY MEDICINE

## 2017-03-14 PROCEDURE — 80061 LIPID PANEL: CPT

## 2017-03-14 PROCEDURE — 83036 HEMOGLOBIN GLYCOSYLATED A1C: CPT

## 2017-03-14 PROCEDURE — 84439 ASSAY OF FREE THYROXINE: CPT

## 2017-03-14 RX ORDER — AMLODIPINE BESYLATE 2.5 MG/1
2.5 TABLET ORAL DAILY
Qty: 30 TABLET | Refills: 1 | Status: SHIPPED | OUTPATIENT
Start: 2017-03-14 | End: 2017-04-05

## 2017-03-14 NOTE — PROGRESS NOTES
[facility]  Progress Note    SUBJECTIVE:     Chief Complaint   Patient presents with    Annual Exam       HPI   Solomon Busby Jr.  is a 31 y.o. male here for routine physical exam. Doing well, just has occasional b/l hand numbness which is happening more frequently. Has to shake hands at times to get better. No known causes.     Review of patient's allergies indicates:   Allergen Reactions    Ibuprofen Hives       Past Medical History:   Diagnosis Date    Asthma     Hernia      Past Surgical History:   Procedure Laterality Date    HERNIA REPAIR       Family History   Problem Relation Age of Onset    Diabetes Mother     Hypertension Mother     Carpal tunnel syndrome Father     Liver disease Father     No Known Problems Sister     No Known Problems Brother     Allergies Daughter     Asthma Daughter     No Known Problems Son     No Known Problems Sister     No Known Problems Sister     No Known Problems Brother     No Known Problems Brother      Social History   Substance Use Topics    Smoking status: Never Smoker    Smokeless tobacco: Never Used    Alcohol use No        Review of Systems   Constitutional: Negative for chills, diaphoresis and fever.   HENT: Negative for congestion, ear pain and sore throat.    Eyes: Negative for photophobia and discharge.   Respiratory: Negative for cough, shortness of breath and wheezing.    Cardiovascular: Negative for chest pain and palpitations.   Gastrointestinal: Negative for abdominal pain, constipation, diarrhea, nausea and vomiting.   Genitourinary: Negative for dysuria and hematuria.   Musculoskeletal: Negative for back pain and myalgias.   Skin: Negative for itching and rash.   Neurological: Negative for dizziness, sensory change, focal weakness, weakness and headaches.        + b/l hand numbness   All other systems reviewed and are negative.    OBJECTIVE:     Vitals:    03/14/17 1335   BP: 132/80   Pulse: 80   Temp: 98.2 °F (36.8 °C)     Body  mass index is 29 kg/(m^2).    Physical Exam   Constitutional: He is oriented to person, place, and time and well-developed, well-nourished, and in no distress. No distress.   HENT:   Head: Normocephalic and atraumatic.   Right Ear: Tympanic membrane is not perforated, not erythematous and not bulging. No hemotympanum.   Left Ear: Tympanic membrane is not perforated, not erythematous and not bulging. No hemotympanum.   Mouth/Throat: Oropharynx is clear and moist. No oropharyngeal exudate.   Eyes: Conjunctivae and EOM are normal. Pupils are equal, round, and reactive to light.   Neck: No thyromegaly present.   Cardiovascular: Normal rate, regular rhythm and normal heart sounds.  Exam reveals no gallop and no friction rub.    No murmur heard.  Pulmonary/Chest: Effort normal and breath sounds normal. No respiratory distress. He has no wheezes. He has no rales.   Abdominal: Soft. Bowel sounds are normal. He exhibits no distension. There is no tenderness. There is no rebound and no guarding.   Musculoskeletal: Normal range of motion. He exhibits no edema or tenderness.   Normal hand exam, good pulses. Good ROM. No obvious abnormalities.   Lymphadenopathy:     He has no cervical adenopathy.   Neurological: He is alert and oriented to person, place, and time.   Skin: Skin is warm. No rash noted. He is not diaphoretic. No erythema.       Old records were reviewed. Labs and/or images were independently reviewed.    ASSESSMENT     1. Routine physical examination    2. Bilateral hand numbness        PLAN:     Routine physical examination  -     CBC auto differential; Future  -     Comprehensive metabolic panel; Future  -     Hemoglobin A1c; Future  -     Lipid panel; Future  -     T4, free; Future  -     TSH; Future  -     CK; Future; Expected date: 3/14/17  -     We briefly discussed diet, exercise, and routine preventive exams. All questions and comments addressed.    Bilateral hand numbness  -     amlodipine (NORVASC) 2.5 MG  tablet; Take 1 tablet (2.5 mg total) by mouth once daily.  Dispense: 30 tablet; Refill: 1  -     i suspect vasospasm. Will start low dose CCB to decrease occurrence.       RTC PRJUAN M Shaw MD  03/14/2017 2:12 PM

## 2017-03-15 LAB
ESTIMATED AVG GLUCOSE: 117 MG/DL
HBA1C MFR BLD HPLC: 5.7 %

## 2017-03-24 ENCOUNTER — HOSPITAL ENCOUNTER (EMERGENCY)
Facility: OTHER | Age: 32
Discharge: HOME | End: 2017-03-25
Attending: EMERGENCY MEDICINE
Payer: COMMERCIAL

## 2017-03-24 VITALS
HEIGHT: 66 IN | HEART RATE: 60 BPM | DIASTOLIC BLOOD PRESSURE: 64 MMHG | SYSTOLIC BLOOD PRESSURE: 134 MMHG | RESPIRATION RATE: 18 BRPM | TEMPERATURE: 99 F | WEIGHT: 170 LBS | OXYGEN SATURATION: 98 % | BODY MASS INDEX: 27.32 KG/M2

## 2017-03-24 DIAGNOSIS — S39.012A LUMBAR STRAIN, INITIAL ENCOUNTER: Primary | ICD-10-CM

## 2017-03-24 PROCEDURE — 99283 EMERGENCY DEPT VISIT LOW MDM: CPT

## 2017-03-24 RX ORDER — TRAMADOL HYDROCHLORIDE 50 MG/1
50 TABLET ORAL EVERY 4 HOURS PRN
Qty: 20 TABLET | Refills: 0 | Status: SHIPPED | OUTPATIENT
Start: 2017-03-24 | End: 2017-04-03

## 2017-03-24 RX ORDER — ACETAMINOPHEN 500 MG
1000 TABLET ORAL 3 TIMES DAILY PRN
Qty: 30 TABLET | Refills: 0
Start: 2017-03-24 | End: 2017-04-05

## 2017-03-24 RX ORDER — METHOCARBAMOL 750 MG/1
1500 TABLET, FILM COATED ORAL 3 TIMES DAILY
Qty: 30 TABLET | Refills: 0 | Status: SHIPPED | OUTPATIENT
Start: 2017-03-24 | End: 2017-04-03

## 2017-03-24 NOTE — ED AVS SNAPSHOT
[facility] EMERGENCY DEPARTMENT  [address] [address]               Solomon Busby Jr.   3/24/2017 11:15 PM   ED    Description:  Male : 1985   Department:  [facility] Emergency Department           Your Care was Coordinated By:     Provider Role From To    Micha Mtz MD Attending Provider 17 [phone] --      Reason for Visit     Back Pain           Diagnoses this Visit        Comments    Lumbar strain, initial encounter    -  Primary       ED Disposition     ED Disposition Condition Comment    Discharge             To Do List           Follow-up Information     Follow up with Kaushik Shaw MD In 1 week(s).    Specialty:  Family Medicine    Contact information:    [address] [facility] [address]  [phone]          Follow up with Kaushik Shaw MD In 1 week.    Specialty:  Family Medicine    Contact information:    [address] [place] [address]  [phone]         These Medications        Disp Refills Start End    methocarbamol (ROBAXIN) 750 MG Tab 30 tablet 0 3/24/2017 4/3/2017    Take 2 tablets (1,500 mg total) by mouth 3 (three) times daily. - Oral    Pharmacy: [facility] Ph #: [phone]       acetaminophen (TYLENOL) 500 MG tablet 30 tablet 0 3/24/2017     Take 2 tablets (1,000 mg total) by mouth 3 (three) times daily as needed for Pain. - Oral    Pharmacy: [facility] [address] Ph #: [phone]       tramadol (ULTRAM) 50 mg tablet 20 tablet 0 3/24/2017 4/3/2017    Take 1 tablet (50 mg total) by mouth every 4 (four) hours as needed for Pain. - Oral    Pharmacy: [facility] [address] Ph #: [phone]         [facility]     [facility] On [facility] Care Line -  Assistance  Registered nurses in the [facility] provide clinical advisement, health education,  appointment booking, and other advisory services.  Call for this free service at [phone].             Medications           Message regarding Medications     Verify the changes and/or additions to your medication regime listed below are the same as discussed with your clinician today.  If any of these changes or additions are incorrect, please notify your healthcare provider.        START taking these NEW medications        Refills    methocarbamol (ROBAXIN) 750 MG Tab 0    Sig: Take 2 tablets (1,500 mg total) by mouth 3 (three) times daily.    Class: Print    Route: Oral    acetaminophen (TYLENOL) 500 MG tablet 0    Sig: Take 2 tablets (1,000 mg total) by mouth 3 (three) times daily as needed for Pain.    Class: No Print    Route: Oral    tramadol (ULTRAM) 50 mg tablet 0    Sig: Take 1 tablet (50 mg total) by mouth every 4 (four) hours as needed for Pain.    Class: Print    Route: Oral           Verify that the below list of medications is an accurate representation of the medications you are currently taking.  If none reported, the list may be blank. If incorrect, please contact your healthcare provider. Carry this list with you in case of emergency.           Current Medications     acetaminophen (TYLENOL) 500 MG tablet Take 2 tablets (1,000 mg total) by mouth 3 (three) times daily as needed for Pain.    albuterol 90 mcg/actuation inhaler Inhale 2 puffs into the lungs every 6 (six) hours as needed for Wheezing or Shortness of Breath.    albuterol-ipratropium 2.5mg-0.5mg/3mL (DUO-NEB) 0.5 mg-3 mg(2.5 mg base)/3 mL nebulizer solution Take 3 mLs by nebulization every 6 (six) hours as needed for Wheezing or Shortness of Breath.    amlodipine (NORVASC) 2.5 MG tablet Take 1 tablet (2.5 mg total) by mouth once daily.    beclomethasone (QVAR) 40 mcg/actuation Aero Inhale 1 puff into the lungs 2 (two) times daily.    hyoscyamine (ANASPAZ,LEVSIN) 0.125 mg Tab Take 1 tablet (125 mcg total) by mouth every 4 (four)  "hours as needed.    methocarbamol (ROBAXIN) 750 MG Tab Take 2 tablets (1,500 mg total) by mouth 3 (three) times daily.    ondansetron (ZOFRAN) 4 MG tablet Take 2 tablets (8 mg total) by mouth every 8 (eight) hours as needed for Nausea.    tramadol (ULTRAM) 50 mg tablet Take 1 tablet (50 mg total) by mouth every 4 (four) hours as needed for Pain.    triamcinolone acetonide 0.1% (KENALOG) 0.1 % Lotn Apply topically 2 (two) times daily.           Clinical Reference Information           Your Vitals Were     BP Pulse Temp Resp Height Weight    134/64 60 98.7 °F (37.1 °C) 18 5' 6" (1.676 m) 77.1 kg (170 lb)    SpO2 BMI             98% 27.44 kg/m2         Allergies as of 3/24/2017        Reactions    Ibuprofen Hives      Immunizations Administered on Date of Encounter - 3/24/2017     None      ED Micro, Lab, POCT     None      ED Imaging Orders     None        Discharge Instructions           Back Sprain or Strain    Injury to the muscles (strain) or ligaments (sprain) around the spine can be troubling. Injury may occur after a sudden forceful twisting or bending force such as in a car accident, after a simple awkward movement, or after lifting something heavy with poor body positioning. In any case, muscle spasm is often present and adds to the pain.  Thankfully, most people feel better in 1 to 2 weeks, and most of the rest in 1 to 2 months. Most people can remain active. Unless you had a forceful or traumatic physical injury such as a car accident or fall, X-rays may not be ordered for the first evaluation of a back sprain or strain. If pain continues and does not respond to medical treatment, your healthcare provider may then order X-rays and other tests.  Home care  The following guidelines will help you care for your injury at home:  · When in bed, try to find a comfortable position. A firm mattress is best. Try lying flat on your back with pillows under your knees. You can also try lying on your side with your knees " bent up toward your chest and a pillow between your knees.  · Don't sit for long periods. Try not to take long car rides or take other trips that have you sitting for a long time. This puts more stress on the lower back than standing or walking.  · During the first 24 to 72 hours after an injury or flare-up, apply an ice pack to the painful area for 20 minutes. Then remove it for 20 minutes. Do this for 60 to 90 minutes, or several times a day. This will reduce swelling and pain. Be sure to wrap the ice pack in a thin towel or plastic to protect your skin.  · You can start with ice, then switch to heat. Heat from a hot shower, hot bath, or heating pad reduces pain and works well for muscle spasms. Put heat on the painful area for 20 minutes, then remove for 20 minutes. Do this for 60 to 90 minutes, or several times a day. Do not use a heating pad while sleeping. It can burn the skin.  · You can alternate the ice and heat. Talk with your healthcare provider to find out the best treatment or therapy for your back pain.  · Therapeutic massage will help relax the back muscles without stretching them.  · Be aware of safe lifting methods. Do not lift anything over 15 pounds until all of the pain is gone.  Medicines  Talk to your healthcare provider before using medicines, especially if you have other health problems or are taking other medicines.  · You may use acetaminophen or ibuprofen to control pain, unless another pain medicine was prescribed. If you have chronic conditions like diabetes, liver or kidney disease, stomach ulcers, or gastrointestinal bleeding, or are taking blood-thinner medicines, talk with your doctor before taking any medicines.  · Be careful if you are given prescription medicines, narcotics, or medicine for muscle spasm. They can cause drowsiness, and affect your coordination, reflexes, and judgment. Do not drive or operate heavy machinery when taking these types of medicines. Only take pain  medicine as prescribed by your healthcare provider.  Follow-up care  Follow up with your healthcare provider, or as advised. You may need physical therapy or more tests if your symptoms get worse.  If you had X-rays your healthcare provider may be checking for any broken bones, breaks, or fractures. Bruises and sprains can sometimes hurt as much as a fracture. These injuries can take time to heal completely. If your symptoms dont improve or they get worse, talk with your healthcare provider. You may need a repeat X-ray or other tests.  Call 911  Call for emergency care if any of the following occur:  · Trouble breathing  · Confused  · Very drowsy or trouble awakening  · Fainting or loss of consciousness  · Rapid or very slow heart rate  · Loss of bowel or bladder control  When to seek medical advice  Call your healthcare provider right away if any of the following occur:  · Pain gets worse or spreads to your arms or legs  · Weakness or numbness in one or both arms or legs  · Numbness in the groin or genital area  Date Last Reviewed: 6/1/2016 © 2000-2016 [employer]. [address] [place] [address]. All rights reserved. This information is not intended as a substitute for professional medical care. Always follow your healthcare professional's instructions.          Self-Care for Low Back Pain    Most people have low back pain now and then. In many cases, it isnt serious and self-care can help. Sometimes low back pain can be a sign of a bigger problem. Call your healthcare provider if your pain returns often or gets worse over time. For the long-term care of your back, get regular exercise, lose any excess weight and learn good posture.  Take a short rest  Lying down during the day may be beneficial for short periods of time if severe pain increases with sitting or standing. Long-term bed rest could be detrimental.  Reduce pain and swelling  Cold reduces swelling. Both cold and heat can  reduce pain. Protect your skin by placing a towel between your body and the ice or heat source.  · For the first few days, apply an ice pack for 15 to 20 minutes .  · After the first few days, try heat for 15 minutes at a time to ease pain. Never sleep on a heating pad.  · Over-the-counter medicine can help control pain and swelling. Try aspirin or ibuprofen.  Exercise  Exercise can help your back heal. It also helps your back get stronger and more flexible, preventing any reinjury. Ask your healthcare provider about specific exercises for your back.  Use good posture to avoid reinjury  · When moving, bend at the hips and knees. Dont bend at the waist or twist around.  · When lifting, keep the object close to your body. Dont try to lift more than you can handle.  · When sitting, keep your lower back supported. Use a rolled-up towel as needed.  Seek immediate medical care if:  · Youre unable to stand or walk.  · You have a temperature over 100.4°F (38.0°C)  · You have frequent, painful, or bloody urination.  · You have severe abdominal pain.  · You have a sharp, stabbing pain.  · Your pain is constant.  · You have pain or numbness in your leg.  · You feel pain in a new area of your back.  · You notice that the pain isnt decreasing after more than a week.   Date Last Reviewed: 9/29/2015  © 3795-9990 [employer]. [address] [place] [address]. All rights reserved. This information is not intended as a substitute for professional medical care. Always follow your healthcare professional's instructions.          [facility] Sign-Up     Activating your [facility] account is as easy as 1-2-3!     1) Visit [email or web address], select Sign Up Now, enter this activation code and your date of birth, then select Next.  [email or web address]  Expires: 5/8/2017 11:45 PM      2) Create a username and password to use when you visit [facility] in the future and select a security question in case you lose your  password and select Next.    3) Enter your e-mail address and click Sign Up!    Additional Information  If you have questions, please e-mail [email or web address] or call [phone] to talk to our [facility] staff. Remember, [facility] is NOT to be used for urgent needs. For medical emergencies, dial 911.          [facility] Emergency Department complies with applicable Federal civil rights laws and does not discriminate on the basis of race, color, national origin, age, disability, or sex.        Language Assistance Services     ATTENTION: Language assistance services are available, free of charge. Please call [phone].      ATENCIÓN: Si habla español, tiene a howard disposición servicios gratuitos de asistencia lingüística. Llame al [phone].     CHÚ Ý: N?u b?n nói Ti?ng Vi?t, có các d?ch v? h? tr? ngôn ng? mi?n phí dành cho b?n. G?i s? [phone].

## 2017-03-25 NOTE — ED NOTES
Pt reports pain to lower back for tne past 2 days, denies trauma, states he has just been going about his normal routine, denies radiation to legs, no bladder dysfunction

## 2017-03-25 NOTE — ED PROVIDER NOTES
Encounter Date: 3/24/2017       History     Chief Complaint   Patient presents with    Back Pain     pt c/o Lower back pain x 2 days     Review of patient's allergies indicates:   Allergen Reactions    Ibuprofen Hives     Patient is a 31 y.o. male presenting with the following complaint: back pain. The history is provided by the patient.   Back Pain    This is a new problem. The current episode started several days ago. The problem occurs constantly. The problem has been unchanged. The pain is associated with lifting heavy objects. The pain is present in the lumbar spine. The quality of the pain is described as aching. The pain does not radiate. The pain is at a severity of 6/10. The symptoms are aggravated by bending, twisting and certain positions. Pertinent negatives include no abdominal swelling, no bowel incontinence, no perianal numbness, no bladder incontinence, no paresthesias, no paresis, no tingling and no weakness. He has tried analgesics for the symptoms. The treatment provided mild relief.     Past Medical History:   Diagnosis Date    Asthma     Hernia      Past Surgical History:   Procedure Laterality Date    HERNIA REPAIR       Family History   Problem Relation Age of Onset    Diabetes Mother     Hypertension Mother     Carpal tunnel syndrome Father     Liver disease Father     No Known Problems Sister     No Known Problems Brother     Allergies Daughter     Asthma Daughter     No Known Problems Son     No Known Problems Sister     No Known Problems Sister     No Known Problems Brother     No Known Problems Brother      Social History   Substance Use Topics    Smoking status: Never Smoker    Smokeless tobacco: Never Used    Alcohol use No     Review of Systems   Constitutional: Negative.    HENT: Negative.    Eyes: Negative.    Respiratory: Negative.    Cardiovascular: Negative.    Gastrointestinal: Negative.  Negative for bowel incontinence.   Endocrine: Negative.     Genitourinary: Negative.  Negative for bladder incontinence.   Musculoskeletal: Positive for back pain.   Skin: Negative.    Allergic/Immunologic: Negative.    Neurological: Negative.  Negative for tingling, weakness and paresthesias.   Hematological: Negative.    Psychiatric/Behavioral: Negative.    All other systems reviewed and are negative.      Physical Exam   Initial Vitals   BP Pulse Resp Temp SpO2   03/24/17 2321 03/24/17 2321 03/24/17 2321 03/24/17 2321 03/24/17 2321   134/64 60 18 98.7 °F (37.1 °C) 98 %     Physical Exam    Nursing note and vitals reviewed.  Constitutional: Vital signs are normal. He appears well-developed. He is active and cooperative.   HENT:   Head: Normocephalic and atraumatic.   Eyes: Conjunctivae, EOM and lids are normal. Pupils are equal, round, and reactive to light.   Neck: Trachea normal and full passive range of motion without pain. Neck supple. No thyroid mass present.   Cardiovascular: Normal rate, regular rhythm, S1 normal, S2 normal, normal heart sounds, intact distal pulses and normal pulses.   Abdominal: Soft. Normal appearance, normal aorta and bowel sounds are normal.   Musculoskeletal:        Lumbar back: He exhibits decreased range of motion, tenderness, pain and spasm. He exhibits no bony tenderness.        Back:    Lymphadenopathy:     He has no axillary adenopathy.   Neurological: He is alert and oriented to person, place, and time.   Skin: Skin is warm, dry and intact.   Psychiatric: He has a normal mood and affect. His speech is normal and behavior is normal. Judgment and thought content normal. Cognition and memory are normal.         ED Course   Procedures  Labs Reviewed - No data to display                            ED Course     Clinical Impression:   The encounter diagnosis was Lumbar strain, initial encounter.          Micha Mtz MD  03/24/17 3362

## 2017-03-25 NOTE — DISCHARGE INSTRUCTIONS
Back Sprain or Strain    Injury to the muscles (strain) or ligaments (sprain) around the spine can be troubling. Injury may occur after a sudden forceful twisting or bending force such as in a car accident, after a simple awkward movement, or after lifting something heavy with poor body positioning. In any case, muscle spasm is often present and adds to the pain.  Thankfully, most people feel better in 1 to 2 weeks, and most of the rest in 1 to 2 months. Most people can remain active. Unless you had a forceful or traumatic physical injury such as a car accident or fall, X-rays may not be ordered for the first evaluation of a back sprain or strain. If pain continues and does not respond to medical treatment, your healthcare provider may then order X-rays and other tests.  Home care  The following guidelines will help you care for your injury at home:  · When in bed, try to find a comfortable position. A firm mattress is best. Try lying flat on your back with pillows under your knees. You can also try lying on your side with your knees bent up toward your chest and a pillow between your knees.  · Don't sit for long periods. Try not to take long car rides or take other trips that have you sitting for a long time. This puts more stress on the lower back than standing or walking.  · During the first 24 to 72 hours after an injury or flare-up, apply an ice pack to the painful area for 20 minutes. Then remove it for 20 minutes. Do this for 60 to 90 minutes, or several times a day. This will reduce swelling and pain. Be sure to wrap the ice pack in a thin towel or plastic to protect your skin.  · You can start with ice, then switch to heat. Heat from a hot shower, hot bath, or heating pad reduces pain and works well for muscle spasms. Put heat on the painful area for 20 minutes, then remove for 20 minutes. Do this for 60 to 90 minutes, or several times a day. Do not use a heating pad while sleeping. It can burn the  skin.  · You can alternate the ice and heat. Talk with your healthcare provider to find out the best treatment or therapy for your back pain.  · Therapeutic massage will help relax the back muscles without stretching them.  · Be aware of safe lifting methods. Do not lift anything over 15 pounds until all of the pain is gone.  Medicines  Talk to your healthcare provider before using medicines, especially if you have other health problems or are taking other medicines.  · You may use acetaminophen or ibuprofen to control pain, unless another pain medicine was prescribed. If you have chronic conditions like diabetes, liver or kidney disease, stomach ulcers, or gastrointestinal bleeding, or are taking blood-thinner medicines, talk with your doctor before taking any medicines.  · Be careful if you are given prescription medicines, narcotics, or medicine for muscle spasm. They can cause drowsiness, and affect your coordination, reflexes, and judgment. Do not drive or operate heavy machinery when taking these types of medicines. Only take pain medicine as prescribed by your healthcare provider.  Follow-up care  Follow up with your healthcare provider, or as advised. You may need physical therapy or more tests if your symptoms get worse.  If you had X-rays your healthcare provider may be checking for any broken bones, breaks, or fractures. Bruises and sprains can sometimes hurt as much as a fracture. These injuries can take time to heal completely. If your symptoms dont improve or they get worse, talk with your healthcare provider. You may need a repeat X-ray or other tests.  Call 911  Call for emergency care if any of the following occur:  · Trouble breathing  · Confused  · Very drowsy or trouble awakening  · Fainting or loss of consciousness  · Rapid or very slow heart rate  · Loss of bowel or bladder control  When to seek medical advice  Call your healthcare provider right away if any of the following occur:  · Pain  gets worse or spreads to your arms or legs  · Weakness or numbness in one or both arms or legs  · Numbness in the groin or genital area  Date Last Reviewed: 6/1/2016 © 2000-2016 [employer]. [address] [place] [address], [place] [address]. All rights reserved. This information is not intended as a substitute for professional medical care. Always follow your healthcare professional's instructions.          Self-Care for Low Back Pain    Most people have low back pain now and then. In many cases, it isnt serious and self-care can help. Sometimes low back pain can be a sign of a bigger problem. Call your healthcare provider if your pain returns often or gets worse over time. For the long-term care of your back, get regular exercise, lose any excess weight and learn good posture.  Take a short rest  Lying down during the day may be beneficial for short periods of time if severe pain increases with sitting or standing. Long-term bed rest could be detrimental.  Reduce pain and swelling  Cold reduces swelling. Both cold and heat can reduce pain. Protect your skin by placing a towel between your body and the ice or heat source.  · For the first few days, apply an ice pack for 15 to 20 minutes .  · After the first few days, try heat for 15 minutes at a time to ease pain. Never sleep on a heating pad.  · Over-the-counter medicine can help control pain and swelling. Try aspirin or ibuprofen.  Exercise  Exercise can help your back heal. It also helps your back get stronger and more flexible, preventing any reinjury. Ask your healthcare provider about specific exercises for your back.  Use good posture to avoid reinjury  · When moving, bend at the hips and knees. Dont bend at the waist or twist around.  · When lifting, keep the object close to your body. Dont try to lift more than you can handle.  · When sitting, keep your lower back supported. Use a rolled-up towel as needed.  Seek immediate medical care  if:  · Youre unable to stand or walk.  · You have a temperature over 100.4°F (38.0°C)  · You have frequent, painful, or bloody urination.  · You have severe abdominal pain.  · You have a sharp, stabbing pain.  · Your pain is constant.  · You have pain or numbness in your leg.  · You feel pain in a new area of your back.  · You notice that the pain isnt decreasing after more than a week.   Date Last Reviewed: 9/29/2015 © 2000-2016 [employer]. [address] [place] [address]. All rights reserved. This information is not intended as a substitute for professional medical care. Always follow your healthcare professional's instructions.

## 2017-04-05 ENCOUNTER — OFFICE VISIT (OUTPATIENT)
Dept: FAMILY MEDICINE | Facility: CLINIC | Age: 32
End: 2017-04-05
Payer: COMMERCIAL

## 2017-04-05 VITALS
OXYGEN SATURATION: 98 % | BODY MASS INDEX: 29.35 KG/M2 | DIASTOLIC BLOOD PRESSURE: 68 MMHG | SYSTOLIC BLOOD PRESSURE: 118 MMHG | HEIGHT: 66 IN | WEIGHT: 182.63 LBS | TEMPERATURE: 98 F | HEART RATE: 76 BPM

## 2017-04-05 DIAGNOSIS — M62.830 MUSCLE SPASM OF BACK: Primary | ICD-10-CM

## 2017-04-05 PROCEDURE — 99214 OFFICE O/P EST MOD 30 MIN: CPT | Mod: S$GLB,,, | Performed by: FAMILY MEDICINE

## 2017-04-05 PROCEDURE — 1160F RVW MEDS BY RX/DR IN RCRD: CPT | Mod: S$GLB,,, | Performed by: FAMILY MEDICINE

## 2017-04-05 PROCEDURE — 99999 PR PBB SHADOW E&M-EST. PATIENT-LVL III: CPT | Mod: PBBFAC,,, | Performed by: FAMILY MEDICINE

## 2017-04-05 RX ORDER — METHYLPREDNISOLONE 4 MG/1
TABLET ORAL
Qty: 1 PACKAGE | Refills: 0 | Status: SHIPPED | OUTPATIENT
Start: 2017-04-05 | End: 2017-04-26

## 2017-04-05 NOTE — LETTER
April 5, 2017      Solomon Busby   [address]  [address]  [place] [address]             [facility]  [address] [facility] [place] [address]  Phone: [phone]  Fax: [phone] Solomon Busby    Was treated here on 04/05/2017    May Return to work/school on 4/7/17    No Restrictions                Kaushik Shaw MD

## 2017-04-05 NOTE — PROGRESS NOTES
[facility]  Progress Note    SUBJECTIVE:     Chief Complaint   Patient presents with    Back Pain     Lower       HPI   Solomon Busby Jr.  is a 31 y.o. male here for c/o lower back pain for the past week. He has been doing strenuous work, pulling chains. Onset was sudden. Rates it as moderate/severe, non-radiating of the lower back. Has allergy to ibuprofen with gives him shortness of breath and hives. Has been taking the robaxin from the ER without much help.    Review of patient's allergies indicates:   Allergen Reactions    Ibuprofen Hives       Past Medical History:   Diagnosis Date    Asthma     Hernia      Past Surgical History:   Procedure Laterality Date    HERNIA REPAIR       Family History   Problem Relation Age of Onset    Diabetes Mother     Hypertension Mother     Carpal tunnel syndrome Father     Liver disease Father     No Known Problems Sister     No Known Problems Brother     Allergies Daughter     Asthma Daughter     No Known Problems Son     No Known Problems Sister     No Known Problems Sister     No Known Problems Brother     No Known Problems Brother      Social History   Substance Use Topics    Smoking status: Never Smoker    Smokeless tobacco: Never Used    Alcohol use No        Review of Systems   Constitutional: Negative for chills, fever and malaise/fatigue.   Respiratory: Negative.    Cardiovascular: Negative.    Gastrointestinal: Negative for abdominal pain, constipation and diarrhea.   Musculoskeletal: Positive for back pain. Negative for falls, myalgias and neck pain.   Neurological: Negative for weakness and headaches.     OBJECTIVE:     Vitals:    04/05/17 1316   BP: 118/68   Pulse: 76   Temp: 98.4 °F (36.9 °C)     Body mass index is 29.48 kg/(m^2).    Physical Exam   Constitutional: He is oriented to person, place, and time. He appears distressed (mild).   HENT:   Head: Normocephalic and atraumatic.   Neck: Normal range of motion. Neck supple.    Musculoskeletal: He exhibits tenderness (to palpation of paraspinal muscles). He exhibits no edema.   Neurological: He is alert and oriented to person, place, and time. No cranial nerve deficit.   Skin: Skin is warm. He is not diaphoretic.       Old records were reviewed. Labs and/or images were independently reviewed.    ASSESSMENT     1. Muscle spasm of back        PLAN:     Muscle spasm of back  -     methylPREDNISolone (MEDROL DOSEPACK) 4 mg tablet; use as directed  Dispense: 1 Package; Refill: 0  -     Patient counseled on good posture and stretching exercises. Continue to place ice packs on affected areas 3-4 times daily. Take medications as prescribed. Instructed patient to call MD if symptoms persist or worsen.  -     Will refrain from giving NSAIDs due to allergy. Will try medrol dose pack. Continue muscle relaxer.      RTC PRN    Kaushik Shaw MD  04/05/2017 2:12 PM

## 2017-07-01 ENCOUNTER — HOSPITAL ENCOUNTER (EMERGENCY)
Facility: OTHER | Age: 32
Discharge: HOME | End: 2017-07-01
Attending: EMERGENCY MEDICINE
Payer: COMMERCIAL

## 2017-07-01 VITALS
RESPIRATION RATE: 16 BRPM | DIASTOLIC BLOOD PRESSURE: 78 MMHG | HEART RATE: 76 BPM | SYSTOLIC BLOOD PRESSURE: 126 MMHG | TEMPERATURE: 98 F | BODY MASS INDEX: 27.32 KG/M2 | OXYGEN SATURATION: 98 % | HEIGHT: 66 IN | WEIGHT: 170 LBS

## 2017-07-01 DIAGNOSIS — R07.9 CHEST PAIN: Primary | ICD-10-CM

## 2017-07-01 PROCEDURE — 93005 ELECTROCARDIOGRAM TRACING: CPT

## 2017-07-01 PROCEDURE — 99283 EMERGENCY DEPT VISIT LOW MDM: CPT | Mod: 25

## 2017-07-01 PROCEDURE — 25000003 PHARM REV CODE 250: Performed by: EMERGENCY MEDICINE

## 2017-07-01 PROCEDURE — 93010 ELECTROCARDIOGRAM REPORT: CPT | Mod: ,,, | Performed by: INTERNAL MEDICINE

## 2017-07-01 RX ADMIN — ALUMINUM HYDROXIDE, MAGNESIUM HYDROXIDE, SIMETHICONE: 400; 400; 40 SUSPENSION ORAL at 03:07

## 2017-07-01 NOTE — ED NOTES
Pt is an [employer]  and has been driving for a few hours and has had a constant burning pain the middle of his chest. Pt denies N/V/D .  LOC: Pt is awake alert and aware of environment, oriented X3 and speaking appropriately  Appearance: Pt is in no acute distress, Pt is well groomed and clean  Skin: skin is warm and dry with normal turgor, mucus membranes are moist and pink, skin is intact with no bruising or breakdown  Muskuloskeletal: Pt moves all extremities well, there is no obvious swelling or deformities noted, pulses are intact.  Respiratory: Airway is open and patent, respirations are spontaneous and even.  Cardiac: no edema and cap refill is <3sec  Neuro: Pt follows commands easily and has no obvious deficits

## 2017-07-01 NOTE — ED PROVIDER NOTES
Encounter Date: 7/1/2017    SCRIBE #1 NOTE: I, Chapincito Christiansen, am scribing for, and in the presence of, Dr. Camacho.       History     Chief Complaint   Patient presents with    Chest Pain     mid sternal burning constant pain X a few hours     Time seen by provider: 3:26 AM    This is a 31 y.o. male who presents to the ED with a chief complaint of mid sternal chest pain. 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 He states that it has been constant since onset. It is described as sharp and burning. It is rated at a 7/10 in severity currently. He states that he did not attempt any relief of his symptoms PTA. He notes no past similar episodes. He denies any hx of anxiety. He reports no fevers, cough, congestion, or rhinorrhea.       The history is provided by the patient.     Review of patient's allergies indicates:   Allergen Reactions    Ibuprofen Hives     Past Medical History:   Diagnosis Date    Asthma     Hernia      Past Surgical History:   Procedure Laterality Date    HERNIA REPAIR       Family History   Problem Relation Age of Onset    Diabetes Mother     Hypertension Mother     Carpal tunnel syndrome Father     Liver disease Father     No Known Problems Sister     No Known Problems Brother     Allergies Daughter     Asthma Daughter     No Known Problems Son     No Known Problems Sister     No Known Problems Sister     No Known Problems Brother     No Known Problems Brother      Social History   Substance Use Topics    Smoking status: Never Smoker    Smokeless tobacco: Never Used    Alcohol use No     Review of Systems   Constitutional: Negative for fever.   HENT: Negative for congestion, rhinorrhea and sore throat.    Respiratory: Negative for cough and shortness of breath.    Cardiovascular: Positive for chest pain.   Gastrointestinal: Negative for nausea.   Genitourinary: Negative for dysuria.   Musculoskeletal: Negative for back pain.   Skin: Negative for  rash.   Neurological: Negative for weakness.   Hematological: Does not bruise/bleed easily.       Physical Exam     Initial Vitals [07/01/17 0258]   BP Pulse Resp Temp SpO2   126/75 60 14 97.6 °F (36.4 °C) 98 %      MAP       92         Physical Exam    Nursing note and vitals reviewed.  Constitutional: He appears well-developed and well-nourished. He is not diaphoretic. No distress.   HENT:   Head: Normocephalic and atraumatic.   Right Ear: External ear normal.   Left Ear: External ear normal.   Mouth/Throat: Oropharynx is clear and moist.   Eyes: Conjunctivae and EOM are normal. Pupils are equal, round, and reactive to light. Right eye exhibits no discharge. Left eye exhibits no discharge.   Neck: Normal range of motion.   Cardiovascular: Normal rate, regular rhythm and normal heart sounds. Exam reveals no gallop and no friction rub.    No murmur heard.  Pulmonary/Chest: Breath sounds normal. No respiratory distress. He has no wheezes. He has no rhonchi. He has no rales.   Abdominal: Soft. There is no tenderness. There is no rebound and no guarding.   Musculoskeletal: Normal range of motion. He exhibits no edema.   Reproducible chest wall TTP.   Neurological: He is alert and oriented to person, place, and time.   Skin: Skin is warm and dry. No rash and no abscess noted. No erythema. No pallor.   Psychiatric: He has a normal mood and affect. His behavior is normal. Judgment and thought content normal.         ED Course   Procedures  Labs Reviewed - No data to display          Medical Decision Making:   ED Management:  31-year-old male complains of chest pain.  My examination the pain is reproducible.  Differential would include gastritis, musculoskeletal.  EKG is reviewed showing no significant abnormality.  I do not suspect this to be cardiac.  Do not feel any labs are indicated.  Do not suspect pulmonary embolus or pneumonia.  I do not suspect pneumothorax.    Upon reevaluation patient's feeling improved we'll  discharge to follow-up with primary care as an outpatient.            Scribe Attestation:   Scribe #1: I performed the above scribed service and the documentation accurately describes the services I performed. I attest to the accuracy of the note.    Attending Attestation:           Physician Attestation for Scribe:  Physician Attestation Statement for Scribe #1: I, Dr. Camacho, reviewed documentation, as scribed by Chapincito Christiansen in my presence, and it is both accurate and complete.                 ED Course     Clinical Impression:     1. Chest pain                               Vamsi Caamcho, DO  07/01/17 0509

## 2017-08-16 ENCOUNTER — APPOINTMENT (OUTPATIENT)
Dept: RADIOLOGY | Facility: HOSPITAL | Age: 32
End: 2017-08-16
Attending: FAMILY MEDICINE
Payer: COMMERCIAL

## 2017-08-16 ENCOUNTER — OFFICE VISIT (OUTPATIENT)
Dept: FAMILY MEDICINE | Facility: CLINIC | Age: 32
End: 2017-08-16
Payer: COMMERCIAL

## 2017-08-16 VITALS
BODY MASS INDEX: 28.88 KG/M2 | HEIGHT: 66 IN | TEMPERATURE: 98 F | RESPIRATION RATE: 14 BRPM | SYSTOLIC BLOOD PRESSURE: 100 MMHG | WEIGHT: 179.69 LBS | OXYGEN SATURATION: 97 % | HEART RATE: 58 BPM | DIASTOLIC BLOOD PRESSURE: 72 MMHG

## 2017-08-16 DIAGNOSIS — M54.2 NECK PAIN: ICD-10-CM
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
## 2017-08-16 DIAGNOSIS — M62.838 MUSCLE SPASMS OF NECK: ICD-10-CM

## 2017-08-16 DIAGNOSIS — J45.20 MILD INTERMITTENT ASTHMA WITHOUT COMPLICATION: ICD-10-CM
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
## 2017-08-16 PROCEDURE — 72040 X-RAY EXAM NECK SPINE 2-3 VW: CPT | Mod: TC,PN

## 2017-08-16 PROCEDURE — 99214 OFFICE O/P EST MOD 30 MIN: CPT | Mod: 25,S$GLB,, | Performed by: FAMILY MEDICINE

## 2017-08-16 PROCEDURE — 96372 THER/PROPH/DIAG INJ SC/IM: CPT | Mod: S$GLB,,, | Performed by: FAMILY MEDICINE

## 2017-08-16 PROCEDURE — 72040 X-RAY EXAM NECK SPINE 2-3 VW: CPT | Mod: 26,,, | Performed by: RADIOLOGY

## 2017-08-16 PROCEDURE — 3008F BODY MASS INDEX DOCD: CPT | Mod: S$GLB,,, | Performed by: FAMILY MEDICINE

## 2017-08-16 PROCEDURE — 99999 PR PBB SHADOW E&M-EST. PATIENT-LVL III: CPT | Mod: PBBFAC,,, | Performed by: FAMILY MEDICINE

## 2017-08-16 RX ORDER — METHYLPREDNISOLONE 4 MG/1
TABLET ORAL
Qty: 1 PACKAGE | Refills: 0 | Status: SHIPPED | OUTPATIENT
Start: 2017-08-16 | End: 2017-08-29 | Stop reason: ALTCHOICE

## 2017-08-16 RX ORDER — TIZANIDINE HYDROCHLORIDE 4 MG/1
4 CAPSULE, GELATIN COATED ORAL 2 TIMES DAILY PRN
Qty: 60 CAPSULE | Refills: 0 | Status: SHIPPED | OUTPATIENT
Start: 2017-08-16 | End: 2017-08-26

## 2017-08-16 RX ORDER — ALBUTEROL SULFATE 90 UG/1
2 AEROSOL, METERED RESPIRATORY (INHALATION) EVERY 6 HOURS PRN
Qty: 1 INHALER | Refills: 3 | Status: SHIPPED | OUTPATIENT
Start: 2017-08-16 | End: 2018-07-11 | Stop reason: SDUPTHER

## 2017-08-16 RX ADMIN — DEXAMETHASONE SODIUM PHOSPHATE 8 MG: 4 INJECTION, SOLUTION INTRA-ARTICULAR; INTRALESIONAL; INTRAMUSCULAR; INTRAVENOUS; SOFT TISSUE at 08:08

## 2017-08-16 NOTE — LETTER
August 16, 2017    Solomon Busby Jr.  [address]  [address]  [place] [address]         [facility]  [address] [facility]  [place] [address]  Phone: [phone] August 16, 2017     Patient: Solomon Busby Jr.   YOB: 1985   Date of Visit: 8/16/2017       To Whom It May Concern:    It is my medical opinion that Solomon Busby may return to light duty immediately with the following restrictions: no lifting heavier than 15 pounds.    If you have any questions or concerns, please don't hesitate to call.    Sincerely,        Madan Joe MD
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
## 2017-08-17 RX ORDER — DEXAMETHASONE SODIUM PHOSPHATE 4 MG/ML
8 INJECTION, SOLUTION INTRA-ARTICULAR; INTRALESIONAL; INTRAMUSCULAR; INTRAVENOUS; SOFT TISSUE
Status: COMPLETED | OUTPATIENT
Start: 2017-08-17 | End: 2017-08-16

## 2017-08-29 ENCOUNTER — OFFICE VISIT (OUTPATIENT)
Dept: FAMILY MEDICINE | Facility: CLINIC | Age: 32
End: 2017-08-29
Payer: COMMERCIAL

## 2017-08-29 ENCOUNTER — TELEPHONE (OUTPATIENT)
Dept: FAMILY MEDICINE | Facility: CLINIC | Age: 32
End: 2017-08-29

## 2017-08-29 VITALS
SYSTOLIC BLOOD PRESSURE: 122 MMHG | DIASTOLIC BLOOD PRESSURE: 70 MMHG | BODY MASS INDEX: 27.99 KG/M2 | WEIGHT: 174.19 LBS | RESPIRATION RATE: 18 BRPM | HEART RATE: 52 BPM | HEIGHT: 66 IN | TEMPERATURE: 98 F | OXYGEN SATURATION: 99 %

## 2017-08-29 DIAGNOSIS — J45.20 REACTIVE AIRWAY DISEASE, MILD INTERMITTENT, UNCOMPLICATED: ICD-10-CM
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
## 2017-08-29 DIAGNOSIS — Z76.89 RETURN TO WORK EVALUATION: Primary | ICD-10-CM

## 2017-08-29 PROCEDURE — 3008F BODY MASS INDEX DOCD: CPT | Mod: S$GLB,,, | Performed by: FAMILY MEDICINE

## 2017-08-29 PROCEDURE — 99214 OFFICE O/P EST MOD 30 MIN: CPT | Mod: S$GLB,,, | Performed by: FAMILY MEDICINE

## 2017-08-29 PROCEDURE — 99999 PR PBB SHADOW E&M-EST. PATIENT-LVL III: CPT | Mod: PBBFAC,,, | Performed by: FAMILY MEDICINE
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
## 2017-08-29 NOTE — PATIENT INSTRUCTIONS
Exercises at Your Workstation: Shoulders  Tight shoulders? Aching back? A few easy moves can help your shoulders and back feel better. Take a few minutes during your day to do these exercises, right at your desk. They'll loosen up your muscles, keep you more alert, and make a big difference in how you work and feel.  Breathe deeply as you do your exercises. Inhale through your nose, and exhale through your mouth.     For your shoulders  Warm-up  · Drop your head gently to your chest. While breathing in, slowly roll your head up to your left shoulder. While breathing out, slowly roll your head back to center. Repeat to the right.  · Repeat 3 times on each side.  Shoulder raise  · Slowly raise your shoulders toward your ears. Hold for a few seconds.  · Slowly bring your shoulders down and relax.  · Repeat 3 times.    Back press  · Put your hands up, forearms raised.  · Push your arms back, squeezing your shoulder blades. Hold for a few seconds, then relax.  · Repeat 3 times.  If you feel pain while doing these stretching exercises, please stop and consult your doctor.   Date Last Reviewed: 12/28/2015  © 9351-8614 [employer]. [address] [place] [address], [place] [address]. All rights reserved. This information is not intended as a substitute for professional medical care. Always follow your healthcare professional's instructions.

## 2017-08-29 NOTE — LETTER
August 29, 2017      [facility]  [address]  [place] [address]  Phone: [phone]       Patient: Solomon uBsby   YOB: 1985  Date of Visit: 08/29/2017    To Whom It May Concern:    Charles Busby  was at [facility] on 08/29/2017. He may return to work/school on 8/29/17 with no restrictions. If you have any questions or concerns, or if I can be of further assistance, please do not hesitate to contact me.    Sincerely,    Lucila Link MD

## 2017-08-29 NOTE — TELEPHONE ENCOUNTER
----- Message from Soha Horner sent at 8/29/2017  1:15 PM CDT -----  Contact: [phone]  Pt states he needs letter to return back to work.

## 2017-09-13 ENCOUNTER — LAB VISIT (OUTPATIENT)
Dept: LAB | Facility: HOSPITAL | Age: 32
End: 2017-09-13
Attending: FAMILY MEDICINE
Payer: COMMERCIAL

## 2017-09-13 ENCOUNTER — OFFICE VISIT (OUTPATIENT)
Dept: FAMILY MEDICINE | Facility: CLINIC | Age: 32
End: 2017-09-13
Payer: COMMERCIAL

## 2017-09-13 VITALS
HEIGHT: 66 IN | TEMPERATURE: 99 F | OXYGEN SATURATION: 97 % | DIASTOLIC BLOOD PRESSURE: 80 MMHG | WEIGHT: 174.81 LBS | HEART RATE: 83 BPM | BODY MASS INDEX: 28.09 KG/M2 | SYSTOLIC BLOOD PRESSURE: 130 MMHG

## 2017-09-13 DIAGNOSIS — Z23 NEED FOR TDAP VACCINATION: ICD-10-CM

## 2017-09-13 DIAGNOSIS — M79.10 MYALGIA: ICD-10-CM

## 2017-09-13 DIAGNOSIS — M79.10 MYALGIA: Primary | ICD-10-CM

## 2017-09-13 LAB
ALBUMIN SERPL BCP-MCNC: 3.9 G/DL
ALP SERPL-CCNC: 58 U/L
ALT SERPL W/O P-5'-P-CCNC: 53 U/L
ANION GAP SERPL CALC-SCNC: 11 MMOL/L
AST SERPL-CCNC: 93 U/L
BILIRUB SERPL-MCNC: 0.6 MG/DL
BUN SERPL-MCNC: 15 MG/DL
CALCIUM SERPL-MCNC: 9.4 MG/DL
CHLORIDE SERPL-SCNC: 105 MMOL/L
CK MB SERPL-MCNC: 6.7 NG/ML
CK MB SERPL-RTO: 0.2 %
CK SERPL-CCNC: 2952 U/L
CK SERPL-CCNC: 2952 U/L
CO2 SERPL-SCNC: 25 MMOL/L
CREAT SERPL-MCNC: 1.2 MG/DL
CRP SERPL-MCNC: 1.3 MG/L
ERYTHROCYTE [SEDIMENTATION RATE] IN BLOOD BY WESTERGREN METHOD: 0 MM/HR
EST. GFR  (AFRICAN AMERICAN): >60 ML/MIN/1.73 M^2
EST. GFR  (NON AFRICAN AMERICAN): >60 ML/MIN/1.73 M^2
GLUCOSE SERPL-MCNC: 98 MG/DL
LDH SERPL L TO P-CCNC: 269 U/L
POTASSIUM SERPL-SCNC: 3.5 MMOL/L
PROT SERPL-MCNC: 7.8 G/DL
SODIUM SERPL-SCNC: 141 MMOL/L
TSH SERPL DL<=0.005 MIU/L-ACNC: 0.93 UIU/ML

## 2017-09-13 PROCEDURE — 85651 RBC SED RATE NONAUTOMATED: CPT

## 2017-09-13 PROCEDURE — 3008F BODY MASS INDEX DOCD: CPT | Mod: S$GLB,,, | Performed by: FAMILY MEDICINE

## 2017-09-13 PROCEDURE — 90471 IMMUNIZATION ADMIN: CPT | Mod: S$GLB,,, | Performed by: FAMILY MEDICINE

## 2017-09-13 PROCEDURE — 82553 CREATINE MB FRACTION: CPT

## 2017-09-13 PROCEDURE — 90715 TDAP VACCINE 7 YRS/> IM: CPT | Mod: S$GLB,,, | Performed by: FAMILY MEDICINE

## 2017-09-13 PROCEDURE — 82085 ASSAY OF ALDOLASE: CPT

## 2017-09-13 PROCEDURE — 86038 ANTINUCLEAR ANTIBODIES: CPT

## 2017-09-13 PROCEDURE — 84443 ASSAY THYROID STIM HORMONE: CPT

## 2017-09-13 PROCEDURE — 80053 COMPREHEN METABOLIC PANEL: CPT

## 2017-09-13 PROCEDURE — 83615 LACTATE (LD) (LDH) ENZYME: CPT

## 2017-09-13 PROCEDURE — 99999 PR PBB SHADOW E&M-EST. PATIENT-LVL III: CPT | Mod: PBBFAC,,, | Performed by: FAMILY MEDICINE

## 2017-09-13 PROCEDURE — 36415 COLL VENOUS BLD VENIPUNCTURE: CPT | Mod: PO

## 2017-09-13 PROCEDURE — 86140 C-REACTIVE PROTEIN: CPT

## 2017-09-13 PROCEDURE — 99214 OFFICE O/P EST MOD 30 MIN: CPT | Mod: 25,S$GLB,, | Performed by: FAMILY MEDICINE

## 2017-09-13 RX ORDER — METHYLPREDNISOLONE 4 MG/1
TABLET ORAL
Qty: 1 PACKAGE | Refills: 0 | Status: SHIPPED | OUTPATIENT
Start: 2017-09-13 | End: 2017-09-14

## 2017-09-13 NOTE — PROGRESS NOTES
Tdap vaccine administered, ramesh well. Instructed to wait 15mins for observation, no reaction noted @ time of discharge.

## 2017-09-14 ENCOUNTER — LAB VISIT (OUTPATIENT)
Dept: LAB | Facility: HOSPITAL | Age: 32
End: 2017-09-14
Attending: FAMILY MEDICINE
Payer: COMMERCIAL

## 2017-09-14 ENCOUNTER — OFFICE VISIT (OUTPATIENT)
Dept: FAMILY MEDICINE | Facility: CLINIC | Age: 32
End: 2017-09-14
Payer: COMMERCIAL

## 2017-09-14 VITALS
HEIGHT: 66 IN | OXYGEN SATURATION: 97 % | WEIGHT: 176.81 LBS | BODY MASS INDEX: 28.42 KG/M2 | TEMPERATURE: 98 F | DIASTOLIC BLOOD PRESSURE: 76 MMHG | SYSTOLIC BLOOD PRESSURE: 118 MMHG | HEART RATE: 61 BPM

## 2017-09-14 DIAGNOSIS — M62.82 NON-TRAUMATIC RHABDOMYOLYSIS: ICD-10-CM

## 2017-09-14 DIAGNOSIS — M62.82 NON-TRAUMATIC RHABDOMYOLYSIS: Primary | ICD-10-CM

## 2017-09-14 LAB — HGB S BLD QL SOLY: NEGATIVE

## 2017-09-14 PROCEDURE — 99999 PR PBB SHADOW E&M-EST. PATIENT-LVL III: CPT | Mod: PBBFAC,,, | Performed by: FAMILY MEDICINE

## 2017-09-14 PROCEDURE — 3008F BODY MASS INDEX DOCD: CPT | Mod: S$GLB,,, | Performed by: FAMILY MEDICINE

## 2017-09-14 PROCEDURE — 99214 OFFICE O/P EST MOD 30 MIN: CPT | Mod: S$GLB,,, | Performed by: FAMILY MEDICINE

## 2017-09-14 PROCEDURE — 82375 ASSAY CARBOXYHB QUANT: CPT

## 2017-09-14 PROCEDURE — 85660 RBC SICKLE CELL TEST: CPT

## 2017-09-14 RX ORDER — SODIUM CHLORIDE 9 MG/ML
INJECTION, SOLUTION INTRAVENOUS ONCE
Status: COMPLETED | OUTPATIENT
Start: 2017-09-14 | End: 2017-09-14

## 2017-09-14 RX ADMIN — SODIUM CHLORIDE 1000 ML/HR: 9 INJECTION, SOLUTION INTRAVENOUS at 01:09

## 2017-09-14 NOTE — PROGRESS NOTES
[facility]  Progress Note    SUBJECTIVE:     Chief Complaint   Patient presents with    Leg Pain     pt has been having trouble walking since monday. Pt states the pain starts at his thighs and radiates down to his knees       HPI   Solomon Busby Jr.  is a 31 y.o. male here for c/o of severe b/l lower extremity pain. Onset was 2 days ago. Denies any falls/trauma, drugs, alcohol. This is the worst it has ever been. Lab work drawn yesterday, shows rhabdomyolysis. He states doesn't use any supplements, illicit drugs, or anything. He drinks plenty of water at home.     Review of patient's allergies indicates:   Allergen Reactions    Ibuprofen Hives       Past Medical History:   Diagnosis Date    Asthma     Hernia      Past Surgical History:   Procedure Laterality Date    HERNIA REPAIR       Family History   Problem Relation Age of Onset    Diabetes Mother     Hypertension Mother     Carpal tunnel syndrome Father     Liver disease Father     No Known Problems Sister     No Known Problems Brother     Allergies Daughter     Asthma Daughter     No Known Problems Son     No Known Problems Sister     No Known Problems Sister     No Known Problems Brother     No Known Problems Brother      Social History   Substance Use Topics    Smoking status: Never Smoker    Smokeless tobacco: Never Used    Alcohol use No        Review of Systems   Constitutional: Negative for chills, fever and malaise/fatigue.   HENT: Negative.    Respiratory: Negative.  Negative for cough and shortness of breath.    Cardiovascular: Negative.  Negative for chest pain.   Gastrointestinal: Negative.  Negative for abdominal pain, nausea and vomiting.   Genitourinary: Negative.    Musculoskeletal: Positive for myalgias (both legs).   Neurological: Negative for weakness and headaches.   All other systems reviewed and are negative.    OBJECTIVE:     Vitals:    09/14/17 1257   BP: 118/76   Pulse: 61   Temp: 97.9 °F (36.6 °C)      Body mass index is 28.54 kg/m².    Physical Exam   Constitutional: He is oriented to person, place, and time and well-developed, well-nourished, and in no distress. No distress.   HENT:   Head: Normocephalic and atraumatic.   Nose: Nose normal.   Eyes: Conjunctivae and EOM are normal.   Cardiovascular: Normal rate, regular rhythm and normal heart sounds.  Exam reveals no gallop and no friction rub.    No murmur heard.  Pulmonary/Chest: Effort normal and breath sounds normal. No respiratory distress. He has no wheezes. He has no rales. He exhibits no tenderness.   Abdominal: Soft. Bowel sounds are normal. He exhibits no distension. There is no tenderness. There is no rebound.   Musculoskeletal: He exhibits tenderness (to both thighs, but no obvious fractures, erythema, cyanosis. good perfusion of both legs. palpable b/l dorsalis pedis.).   Neurological: He is alert and oriented to person, place, and time.   Skin: Skin is warm. He is not diaphoretic.       Old records were reviewed. Labs and/or images were independently reviewed.    ASSESSMENT     1. Non-traumatic rhabdomyolysis        PLAN:     Non-traumatic rhabdomyolysis  -     0.9%  NaCl infusion; Inject into the vein once.  -     Carbon Monoxide, Blood; Future; Expected date: 09/14/2017  -     SICKLE CELL SCREEN; Future; Expected date: 09/14/2017  -     Advised patient to drink plenty of fluid to flush out the muscle enzymes to prevent kidney injury. Instructed patient to call back or RTC if symptoms persist or worsen.    RTC PRJUAN M Shaw MD  09/14/2017 1:25 PM

## 2017-09-15 LAB
ALDOLASE SERPL-CCNC: 16 U/L
ANA SER QL IF: NORMAL

## 2017-09-18 ENCOUNTER — TELEPHONE (OUTPATIENT)
Dept: FAMILY MEDICINE | Facility: CLINIC | Age: 32
End: 2017-09-18

## 2017-09-18 LAB — COHGB MFR BLD: 2 %

## 2017-09-18 NOTE — TELEPHONE ENCOUNTER
----- Message from Carmen Samano sent at 9/18/2017 10:32 AM CDT -----  Contact: Self  Pt requesting results. Please call [phone]

## 2017-09-18 NOTE — PROGRESS NOTES
[facility]  Progress Note    SUBJECTIVE:     Chief Complaint   Patient presents with    Leg Pain     bilateral leg pain       HPI   Solomon Busby Jr.  is a 31 y.o. male here for c/o b/l lower leg pain. He describes the pain as severe, and causes problems walking due to the pain. Onset was sudden. No falls/trauma. Doesn't take any new meds. No recent travels.     Review of patient's allergies indicates:   Allergen Reactions    Ibuprofen Hives       Past Medical History:   Diagnosis Date    Asthma     Hernia      Past Surgical History:   Procedure Laterality Date    HERNIA REPAIR       Family History   Problem Relation Age of Onset    Diabetes Mother     Hypertension Mother     Carpal tunnel syndrome Father     Liver disease Father     No Known Problems Sister     No Known Problems Brother     Allergies Daughter     Asthma Daughter     No Known Problems Son     No Known Problems Sister     No Known Problems Sister     No Known Problems Brother     No Known Problems Brother      Social History   Substance Use Topics    Smoking status: Never Smoker    Smokeless tobacco: Never Used    Alcohol use No        Review of Systems   Constitutional: Negative for chills, fever and malaise/fatigue.   HENT: Negative.    Respiratory: Negative.  Negative for cough and shortness of breath.    Cardiovascular: Negative.  Negative for chest pain.   Gastrointestinal: Negative.  Negative for abdominal pain, nausea and vomiting.   Genitourinary: Negative.    Musculoskeletal: Positive for myalgias (b/l leg pain).   Neurological: Negative for weakness and headaches.   All other systems reviewed and are negative.    OBJECTIVE:     Vitals:    09/13/17 1602   BP: 130/80   Pulse: 83   Temp: 98.7 °F (37.1 °C)     Body mass index is 28.22 kg/m².    Physical Exam   Constitutional: He is oriented to person, place, and time and well-developed, well-nourished, and in no distress. No distress.   HENT:   Head:  Normocephalic and atraumatic.   Nose: Nose normal.   Eyes: Conjunctivae and EOM are normal.   Cardiovascular: Normal rate, regular rhythm, normal heart sounds and intact distal pulses.  Exam reveals no gallop and no friction rub.    No murmur heard.  Pulmonary/Chest: Effort normal and breath sounds normal. No respiratory distress. He has no wheezes. He has no rales. He exhibits no tenderness.   Abdominal: Soft. Bowel sounds are normal. He exhibits no distension. There is no tenderness. There is no rebound.   Musculoskeletal: He exhibits tenderness (to palpation of both thighs).   Neurological: He is alert and oriented to person, place, and time.   Skin: Skin is warm. He is not diaphoretic.       Old records were reviewed. Labs and/or images were independently reviewed.    ASSESSMENT     1. Myalgia    2. Need for Tdap vaccination        PLAN:     Myalgia  -     CK; Future; Expected date: 09/13/2017  -     CK-MB; Future; Expected date: 09/13/2017  -     TSH; Future  -     SYLVESTER; Future; Expected date: 09/13/2017  -     Lactate dehydrogenase; Future; Expected date: 09/13/2017  -     Ambulatory referral to Rheumatology  -     Comprehensive metabolic panel; Future  -     Aldolase; Future; Expected date: 09/13/2017  -     Sedimentation rate, manual; Future; Expected date: 09/13/2017  -     C-reactive protein; Future; Expected date: 09/13/2017  -     Will start work-up for rhabdomyolysis. advised to drink plenty of fluids in the mean time.    Need for Tdap vaccination  -     Tdap Vaccine      RTC PRN    Kaushik Shaw MD  09/17/2017 9:58 PM

## 2017-09-20 ENCOUNTER — TELEPHONE (OUTPATIENT)
Dept: FAMILY MEDICINE | Facility: CLINIC | Age: 32
End: 2017-09-20

## 2017-10-16 ENCOUNTER — CLINICAL SUPPORT (OUTPATIENT)
Dept: OCCUPATIONAL MEDICINE | Facility: CLINIC | Age: 32
End: 2017-10-16

## 2017-10-16 DIAGNOSIS — Z00.00 PHYSICAL EXAM: Primary | ICD-10-CM

## 2017-10-16 LAB
BILIRUB UR QL STRIP: NORMAL
CTP QC/QA: YES
GLUCOSE UR QL STRIP: NORMAL
KETONES UR QL STRIP: NORMAL
LEUKOCYTE ESTERASE UR QL STRIP: NORMAL
PH, POC UA: NORMAL (ref 5–8)
POC 5 PANEL DRUG SCREEN: NEGATIVE
POC BLOOD, URINE: NEGATIVE
POC BREATH ALCOHOL: NEGATIVE
POC NITRATES, URINE: NORMAL
PROT UR QL STRIP: NORMAL
SP GR UR STRIP: NORMAL (ref 1–1.03)
UROBILINOGEN UR STRIP-ACNC: NORMAL (ref 0.3–2.2)

## 2017-10-16 PROCEDURE — 92552 PURE TONE AUDIOMETRY AIR: CPT | Mod: S$GLB,,, | Performed by: NURSE PRACTITIONER

## 2017-10-16 PROCEDURE — 99499 UNLISTED E&M SERVICE: CPT | Mod: S$GLB,,, | Performed by: NURSE PRACTITIONER

## 2017-10-16 PROCEDURE — 82075 ASSAY OF BREATH ETHANOL: CPT | Mod: S$GLB,,, | Performed by: NURSE PRACTITIONER

## 2017-10-16 PROCEDURE — 80305 DRUG TEST PRSMV DIR OPT OBS: CPT | Mod: QW,S$GLB,, | Performed by: NURSE PRACTITIONER

## 2017-10-16 PROCEDURE — 80305 DRUG TEST PRSMV DIR OPT OBS: CPT | Mod: S$GLB,,, | Performed by: NURSE PRACTITIONER

## 2017-10-16 PROCEDURE — 94010 BREATHING CAPACITY TEST: CPT | Mod: S$GLB,,, | Performed by: NURSE PRACTITIONER

## 2017-10-31 ENCOUNTER — OFFICE VISIT (OUTPATIENT)
Dept: FAMILY MEDICINE | Facility: CLINIC | Age: 32
End: 2017-10-31
Payer: COMMERCIAL

## 2017-10-31 VITALS
HEIGHT: 66 IN | WEIGHT: 182.88 LBS | OXYGEN SATURATION: 98 % | HEART RATE: 84 BPM | TEMPERATURE: 98 F | SYSTOLIC BLOOD PRESSURE: 140 MMHG | DIASTOLIC BLOOD PRESSURE: 82 MMHG | BODY MASS INDEX: 29.39 KG/M2

## 2017-10-31 DIAGNOSIS — J45.20 MILD INTERMITTENT ASTHMA WITHOUT COMPLICATION: Primary | ICD-10-CM

## 2017-10-31 DIAGNOSIS — M62.830 MUSCLE SPASM OF BACK: Primary | ICD-10-CM

## 2017-10-31 DIAGNOSIS — Z23 NEED FOR IMMUNIZATION AGAINST INFLUENZA: ICD-10-CM

## 2017-10-31 PROCEDURE — 90471 IMMUNIZATION ADMIN: CPT | Mod: S$GLB,,, | Performed by: FAMILY MEDICINE

## 2017-10-31 PROCEDURE — 90686 IIV4 VACC NO PRSV 0.5 ML IM: CPT | Mod: S$GLB,,, | Performed by: FAMILY MEDICINE

## 2017-10-31 PROCEDURE — 96372 THER/PROPH/DIAG INJ SC/IM: CPT | Mod: 59,S$GLB,, | Performed by: FAMILY MEDICINE

## 2017-10-31 PROCEDURE — 99999 PR PBB SHADOW E&M-EST. PATIENT-LVL III: CPT | Mod: PBBFAC,,, | Performed by: FAMILY MEDICINE

## 2017-10-31 PROCEDURE — 99214 OFFICE O/P EST MOD 30 MIN: CPT | Mod: 25,S$GLB,, | Performed by: FAMILY MEDICINE

## 2017-10-31 RX ORDER — NAPROXEN 500 MG/1
500 TABLET ORAL 2 TIMES DAILY PRN
Qty: 30 TABLET | Refills: 0 | Status: SHIPPED | OUTPATIENT
Start: 2017-10-31 | End: 2017-11-28

## 2017-10-31 RX ORDER — METHYLPREDNISOLONE 4 MG/1
TABLET ORAL
Refills: 0 | COMMUNITY
Start: 2017-09-13 | End: 2017-11-28

## 2017-10-31 RX ORDER — KETOROLAC TROMETHAMINE 30 MG/ML
30 INJECTION, SOLUTION INTRAMUSCULAR; INTRAVENOUS ONCE
Status: COMPLETED | OUTPATIENT
Start: 2017-10-31 | End: 2017-10-31

## 2017-10-31 RX ORDER — TIZANIDINE HYDROCHLORIDE 4 MG/1
CAPSULE, GELATIN COATED ORAL
Refills: 0 | COMMUNITY
Start: 2017-08-17 | End: 2017-10-31

## 2017-10-31 RX ORDER — IPRATROPIUM BROMIDE AND ALBUTEROL SULFATE 2.5; .5 MG/3ML; MG/3ML
3 SOLUTION RESPIRATORY (INHALATION) EVERY 6 HOURS PRN
Qty: 100 VIAL | Refills: 4 | Status: SHIPPED | OUTPATIENT
Start: 2017-10-31 | End: 2018-04-08

## 2017-10-31 RX ORDER — CYCLOBENZAPRINE HCL 5 MG
5 TABLET ORAL 3 TIMES DAILY PRN
Qty: 30 TABLET | Refills: 0 | Status: SHIPPED | OUTPATIENT
Start: 2017-10-31 | End: 2017-11-28

## 2017-10-31 RX ADMIN — KETOROLAC TROMETHAMINE 30 MG: 30 INJECTION, SOLUTION INTRAMUSCULAR; INTRAVENOUS at 04:10

## 2017-10-31 NOTE — TELEPHONE ENCOUNTER
----- Message from Tawanda Sigala sent at 10/31/2017 10:26 AM CDT -----  Contact: self  Refill: albuterol-ipratropium 2.5mg-0.5mg/3mL (DUO-NEB) 0.5 mg-3 mg(2.5 mg base)/3 mL nebulizer solution. Send to to [facility] [address] [place] [address]. [place] [id number].    Contact him at [phone].    Thanks-

## 2017-10-31 NOTE — PROGRESS NOTES
[facility]  Progress Note    SUBJECTIVE:     Chief Complaint   Patient presents with    Spasms     back spasms       HPI   Solomon Busby Jr.  is a 32 y.o. male here for c/o back pain for the past week. Rates it as moderate. Located on right middle back. Admits doesn't exercise his back or stretch. Hasn't tried anything for it. Certain positions make it worst.     Review of patient's allergies indicates:   Allergen Reactions    Ibuprofen Hives       Past Medical History:   Diagnosis Date    Asthma     Hernia      Past Surgical History:   Procedure Laterality Date    HERNIA REPAIR       Family History   Problem Relation Age of Onset    Diabetes Mother     Hypertension Mother     Carpal tunnel syndrome Father     Liver disease Father     No Known Problems Sister     No Known Problems Brother     Allergies Daughter     Asthma Daughter     No Known Problems Son     No Known Problems Sister     No Known Problems Sister     No Known Problems Brother     No Known Problems Brother      Social History   Substance Use Topics    Smoking status: Never Smoker    Smokeless tobacco: Never Used    Alcohol use No        Review of Systems   Constitutional: Negative for chills, fever and malaise/fatigue.   Respiratory: Negative.    Cardiovascular: Negative.    Gastrointestinal: Negative for abdominal pain, constipation and diarrhea.   Musculoskeletal: Positive for back pain. Negative for falls, myalgias and neck pain.   Neurological: Negative for weakness and headaches.     OBJECTIVE:     Vitals:    10/31/17 1546   BP: (!) 140/82   Pulse: 84   Temp: 98.2 °F (36.8 °C)     Body mass index is 29.52 kg/m².    Physical Exam   Constitutional: He is oriented to person, place, and time. He appears distressed (mild).   HENT:   Head: Normocephalic and atraumatic.   Neck: Normal range of motion. Neck supple.   Musculoskeletal: He exhibits tenderness (to palpation of paraspinal muscles). He exhibits no edema.    Neurological: He is alert and oriented to person, place, and time. No cranial nerve deficit.   Skin: Skin is warm. He is not diaphoretic.       Old records were reviewed. Labs and/or images were independently reviewed.    ASSESSMENT     1. Muscle spasm of back    2. Need for immunization against influenza        PLAN:     Muscle spasm of back  -     naproxen (NAPROSYN) 500 MG tablet; Take 1 tablet (500 mg total) by mouth 2 (two) times daily as needed.  Dispense: 30 tablet; Refill: 0  -     cyclobenzaprine (FLEXERIL) 5 MG tablet; Take 1 tablet (5 mg total) by mouth 3 (three) times daily as needed for Muscle spasms.  Dispense: 30 tablet; Refill: 0  -     ketorolac injection 30 mg; Inject 30 mg into the muscle once.  -     Patient counseled on good posture and stretching exercises. Continue to place ice packs on affected areas 3-4 times daily. Take medications as prescribed. Instructed patient to call MD if symptoms persist or worsen.    Need for immunization against influenza  -     Influenza - Quadrivalent (3 years & older) (PF)      RTC PRN    Kaushik Shaw MD  10/31/2017 3:58 PM

## 2017-11-01 ENCOUNTER — TELEPHONE (OUTPATIENT)
Dept: FAMILY MEDICINE | Facility: CLINIC | Age: 32
End: 2017-11-01

## 2017-11-01 NOTE — TELEPHONE ENCOUNTER
----- Message from Angelica Porras sent at 11/1/2017 10:39 AM CDT -----  Contact: Self   Patient need a work excuse for yesterday. Please call patient at [phone].        Thank You

## 2017-11-14 ENCOUNTER — LAB VISIT (OUTPATIENT)
Dept: LAB | Facility: HOSPITAL | Age: 32
End: 2017-11-14
Attending: INTERNAL MEDICINE
Payer: COMMERCIAL

## 2017-11-14 ENCOUNTER — INITIAL CONSULT (OUTPATIENT)
Dept: RHEUMATOLOGY | Facility: CLINIC | Age: 32
End: 2017-11-14
Payer: COMMERCIAL

## 2017-11-14 VITALS
HEART RATE: 77 BPM | BODY MASS INDEX: 29.28 KG/M2 | SYSTOLIC BLOOD PRESSURE: 144 MMHG | DIASTOLIC BLOOD PRESSURE: 73 MMHG | HEIGHT: 66 IN | WEIGHT: 182.19 LBS

## 2017-11-14 DIAGNOSIS — R74.8 ELEVATED CK: Primary | ICD-10-CM

## 2017-11-14 DIAGNOSIS — R74.8 ELEVATED CK: ICD-10-CM

## 2017-11-14 DIAGNOSIS — R25.2 MUSCLE CRAMPS: ICD-10-CM

## 2017-11-14 LAB
ALBUMIN SERPL BCP-MCNC: 3.6 G/DL
ALP SERPL-CCNC: 65 U/L
ALT SERPL W/O P-5'-P-CCNC: 44 U/L
AST SERPL-CCNC: 29 U/L
BILIRUB DIRECT SERPL-MCNC: 0.2 MG/DL
BILIRUB SERPL-MCNC: 0.4 MG/DL
CK SERPL-CCNC: 746 U/L
PROT SERPL-MCNC: 8 G/DL

## 2017-11-14 PROCEDURE — 99999 PR PBB SHADOW E&M-EST. PATIENT-LVL III: CPT | Mod: PBBFAC,,, | Performed by: INTERNAL MEDICINE

## 2017-11-14 PROCEDURE — 82085 ASSAY OF ALDOLASE: CPT

## 2017-11-14 PROCEDURE — 82550 ASSAY OF CK (CPK): CPT

## 2017-11-14 PROCEDURE — 80076 HEPATIC FUNCTION PANEL: CPT

## 2017-11-14 PROCEDURE — 36415 COLL VENOUS BLD VENIPUNCTURE: CPT

## 2017-11-14 PROCEDURE — 99245 OFF/OP CONSLTJ NEW/EST HI 55: CPT | Mod: S$GLB,,, | Performed by: INTERNAL MEDICINE

## 2017-11-14 ASSESSMENT — ROUTINE ASSESSMENT OF PATIENT INDEX DATA (RAPID3)
AM STIFFNESS SCORE: 1, YES
PATIENT GLOBAL ASSESSMENT SCORE: 2
PSYCHOLOGICAL DISTRESS SCORE: 0
TOTAL RAPID3 SCORE: 2.33
MDHAQ FUNCTION SCORE: 0
WHEN YOU AWAKENED IN THE MORNING OVER THE LAST WEEK, PLEASE INDICATE THE AMOUNT OF TIME IT TAKES UNTIL YOU ARE AS LIMBER AS YOU WILL BE FOR THE DAY: 6 HRS.
FATIGUE SCORE: 7
PAIN SCORE: 5

## 2017-11-14 NOTE — LETTER
November 14, 2017      Kaushik Shaw MD  [address]  [place] [address]           [facility]  [address]  [place] [address]  Phone: [phone]  Fax: [phone]          Patient: Solomon Busby Jr.   MR Number: [id number]   YOB: 1985   Date of Visit: 11/14/2017       Dear Dr. Kaushik Shaw:    Thank you for referring Solomon Busby to me for evaluation. Attached you will find relevant portions of my assessment and plan of care.    If you have questions, please do not hesitate to call me. I look forward to following Solomon Busby along with you.    Sincerely,    Lonnie Culver MD    Enclosure  CC:  No Recipients    If you would like to receive this communication electronically, please contact [email or web address] or [phone] to request more information on [employer] Link access.    For providers and/or their staff who would like to refer a patient to [facility], please contact us through our one-stop-shop provider referral line, [facility] [place], at [phone].    If you feel you have received this communication in error or would no longer like to receive these types of communications, please e-mail [email or web address]

## 2017-11-15 LAB — ALDOLASE SERPL-CCNC: 9 U/L

## 2017-11-17 NOTE — PROGRESS NOTES
History of present illness: 32-year-old gentleman who has normal physical activity and is in fact training for a half marathon.  He has a 2 year history of muscle cramps.  They occur when he has had significant exercise.  He is unable to tell me how often they occur.  They may last for 3 or 4 days when they occur.  It is primarily in the legs although he had had one episode in the arms.  He denies any muscle weakness.  He has had no numbness or tingling.  He has had no dark urine during the episodes.  He has had no similar problem in the past.  He has been on week protein but no other supplements.  His last bad episode was last week.  He was referred because of elevated CPKs.    He takes no medications for the episodes.  He uses heat and topical medications.  He has been continuing with his training program despite the cramps.    No fever, headache, rash, conjunctivitis, oral ulcers, dry eyes or mouth, Raynaud's phenomenon, pleurisy, chronic or bloody diarrhea, vaginal or urethral discharge or ulcer, numbness or tingling, blood clots or phlebitis.  He has no family history of muscle disease.    Systems review:  Gen.: Weight has increased 25 pounds  GI: No abdominal pain or peptic ulcer disease.  No liver problems.  : No kidney or bladder problems    Physical examination:  Skin: No rashes  ENT: Adequate tears and saliva  Chest: Clear to auscultation and percussion  Cardiac: No murmurs, gallops, rubs  Abdomen: No organomegaly or masses.  No tenderness to palpation  Extremities: No pedal edema.  No sclerodactyly  Musculoskeletal: Full range of motion of all joints.  No synovitis.  No tender areas to palpation  Neurologic: He has normal muscle strength testing in the upper and lower extremities.  He has no muscle spasms.  He has no fatigability with repetitive motion.  Deep tendon reflexes are equal bilaterally.  Laboratory:  The minimum CPK in the past 2 years was 306.  The maximum was 2952 in September.  This was  when he was having cramps.  He also had elevated transaminases and aldolase with his last increase muscle enzymes.    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  -American males can have higher baseline CPK levels but the increase after exercise and the cramping is suggestive of a metabolic myopathy    Plans: The dilemma is how aggressively to workup the metabolic myopathy since there are no definite treatments.  I do not think we are dealing with an inflammatory myopathy or other muscle disease.  I have ordered further laboratory studies for today.  I did recommend a high carbohydrate diet, especially while he is in training.  I did not give him a regular return appointment.  This will depend on the laboratory studies and his future symptoms.

## 2017-11-21 ENCOUNTER — TELEPHONE (OUTPATIENT)
Dept: RHEUMATOLOGY | Facility: CLINIC | Age: 32
End: 2017-11-21

## 2017-11-21 DIAGNOSIS — E88.9 METABOLIC MYOPATHY: Primary | ICD-10-CM

## 2017-11-21 DIAGNOSIS — G73.7 METABOLIC MYOPATHY: Primary | ICD-10-CM

## 2017-11-22 ENCOUNTER — TELEPHONE (OUTPATIENT)
Dept: NEUROLOGY | Facility: CLINIC | Age: 32
End: 2017-11-22

## 2017-11-22 NOTE — TELEPHONE ENCOUNTER
"Notified pt of lab results "The muscle test are still elevated but they are better than they were previously.  I am concerned about the persistent elevation in ordering to other procedures.  I am ordering an MRI of the muscles to see if there is any inflammation.  I would also like to have you see one of our neurologist who specializes in muscle disease."   Pt verbalized understanding  "

## 2017-11-22 NOTE — TELEPHONE ENCOUNTER
----- Message from Deniz Childs sent at 11/22/2017  1:47 PM CST -----  Contact: Edna gore/ [facility] @ ext [phone]  Edna is calling to schedule pt from referral from Dr. Culver in [facility]. Pls call pt directly to schedule, but if pt not available, please call Edna.

## 2017-11-28 ENCOUNTER — OFFICE VISIT (OUTPATIENT)
Dept: FAMILY MEDICINE | Facility: CLINIC | Age: 32
End: 2017-11-28
Payer: COMMERCIAL

## 2017-11-28 VITALS
HEART RATE: 70 BPM | SYSTOLIC BLOOD PRESSURE: 116 MMHG | TEMPERATURE: 98 F | WEIGHT: 181.44 LBS | HEIGHT: 66 IN | OXYGEN SATURATION: 97 % | DIASTOLIC BLOOD PRESSURE: 60 MMHG | BODY MASS INDEX: 29.16 KG/M2

## 2017-11-28 DIAGNOSIS — L50.9 URTICARIA: ICD-10-CM

## 2017-11-28 DIAGNOSIS — L25.9 CONTACT DERMATITIS, UNSPECIFIED CONTACT DERMATITIS TYPE, UNSPECIFIED TRIGGER: Primary | ICD-10-CM

## 2017-11-28 PROCEDURE — 99214 OFFICE O/P EST MOD 30 MIN: CPT | Mod: S$GLB,,, | Performed by: NURSE PRACTITIONER

## 2017-11-28 PROCEDURE — 99999 PR PBB SHADOW E&M-EST. PATIENT-LVL IV: CPT | Mod: PBBFAC,,, | Performed by: NURSE PRACTITIONER

## 2017-11-28 RX ORDER — HYDROXYZINE HYDROCHLORIDE 25 MG/1
25 TABLET, FILM COATED ORAL 4 TIMES DAILY
Qty: 30 TABLET | Refills: 0 | Status: SHIPPED | OUTPATIENT
Start: 2017-11-28 | End: 2018-01-23

## 2017-11-28 RX ORDER — METHYLPREDNISOLONE 4 MG/1
TABLET ORAL
Qty: 1 PACKAGE | Refills: 0 | Status: SHIPPED | OUTPATIENT
Start: 2017-11-28 | End: 2017-12-19

## 2017-11-28 NOTE — PROGRESS NOTES
Subjective:       Patient ID: Solomon Busby Jr. is a 32 y.o. male.    Chief Complaint: Rash    Rash   This is a new problem. The current episode started 1 to 4 weeks ago. The problem has been waxing and waning since onset. The rash is diffuse. The rash is characterized by itchiness (red bumps ). It is unknown if there was an exposure to a precipitant. Pertinent negatives include no anorexia, congestion, cough, diarrhea, eye pain, facial edema, fatigue, fever, joint pain, nail changes, rhinorrhea, shortness of breath, sore throat or vomiting. Past treatments include nothing.     Review of Systems   Constitutional: Negative for fatigue and fever.   HENT: Negative for congestion, rhinorrhea and sore throat.    Eyes: Negative for pain.   Respiratory: Negative for cough and shortness of breath.    Gastrointestinal: Negative for anorexia, diarrhea and vomiting.   Musculoskeletal: Negative for joint pain.   Skin: Positive for rash. Negative for color change, nail changes and wound.       Objective:      Physical Exam   Constitutional: He is oriented to person, place, and time. Vital signs are normal. He appears well-developed and well-nourished.   Cardiovascular: Normal rate, regular rhythm and normal heart sounds.    Pulmonary/Chest: Effort normal and breath sounds normal.   Abdominal: Soft. Bowel sounds are normal.   Neurological: He is alert and oriented to person, place, and time.   Skin: Skin is warm, dry and intact. Capillary refill takes less than 2 seconds. Rash noted.   Psychiatric: He has a normal mood and affect.       Assessment:       1. Contact dermatitis, unspecified contact dermatitis type, unspecified trigger    2. Urticaria        Plan:       Solomon was seen today for rash.    Diagnoses and all orders for this visit:    Contact dermatitis, unspecified contact dermatitis type, unspecified trigger  -     methylPREDNISolone (MEDROL DOSEPACK) 4 mg tablet; use as directed  -     hydrOXYzine HCl (ATARAX) 25 MG  tablet; Take 1 tablet (25 mg total) by mouth 4 (four) times daily.  -     Ambulatory referral to Dermatology    Urticaria  -     methylPREDNISolone (MEDROL DOSEPACK) 4 mg tablet; use as directed  -     hydrOXYzine HCl (ATARAX) 25 MG tablet; Take 1 tablet (25 mg total) by mouth 4 (four) times daily.  -     Ambulatory referral to Dermatology    Home care  Follow these tips:  · Try to find the cause of the hives and eliminate it. Discuss possible causes with your healthcare provider. Future reactions to the same allergen may be worse.  · Dont scratch the hives. Scratching will delay healing. To reduce itching, apply cool, wet compresses to the skin.  · Dress in soft, loose cotton clothing.  · Dont bathe in hot water. This can make the itching worse.  · Apply an ice pack or cool pack wrapped in a thin towel to your skin. This will help reduce redness and itching. But if your hives were caused by exposure to cold, then do not apply more cold to them.  · You may use over-the counter antihistamines to reduce itching. Some older antihistamines, such as diphenhydramine and chlorpheniramine, are inexpensive. But they need to be taken often and may make you sleepy. They are best used at bedtime. Dont use diphenhydramine if you have glaucoma or have trouble urinating because of an enlarged prostate. Newer antihistamines, such as loratadine, cetirizine, and fexofenadine, are generally more expensive. But they tend to have fewer side effects, such as drowsiness. They can be taken less often.  · Another type of antihistamine is used to treat heartburn. This type includes ranitidine, nizatidine, famotidine, and cimetidine. These are sometimes used along with the above antihistamines if a single medicine is not working.  Follow-up care  Follow up with your healthcare provider if your symptoms don't get better in 2 days. Ask your provider about allergy testing if you have had a severe reaction, or have had several episodes of  hives. He or she can use the allergy testing to find out what you are allergic to.  When to seek medical advice  Call your healthcare provider right away if any of these occur:  · Fever of 100.4°F (38.0°C) or higher, or as directed by your healthcare provider  · Redness, swelling, or pain  · Foul-smelling fluid coming from the rash  Call 911  Call 911 if any of the following occur:  · Swelling of the face, throat, or tongue  · Trouble breathing or swallowing  · Dizziness, weakness, or fainting  Date Last Reviewed: 9/1/2016  © 0997-7204 [employer]. [address] [place] [address], [place] [address]. All rights reserved. This information is not intended as a substitute for professional medical care. Always follow your healthcare professional's instructions.

## 2017-11-28 NOTE — PATIENT INSTRUCTIONS
Hives (Adult)  Hives are pink or red bumps on the skin. These bumps are also known as wheals. The bumps can itch, burn, or sting. Hives can occur anywhere on the body. They vary in size and shape and can form in clusters. Individual hives can appear and go away quickly. New hives may develop as old ones fade. Hives are common and usually harmless. Occasionally hives are a sign of a serious allergy.  Hives are often caused by an allergic reaction. It may be an allergic reaction to foods such as fruit, shellfish, chocolate, nuts, or tomatoes. It may be a reaction to pollens, animal fur, or mold spores. Medicines, chemicals, and insect bites can also cause hives. And hives can be caused by hot sun or cold air. The cause of hives can be difficult to find.  You may be given medicines to relieve swelling and itching. Follow all instructions when using these medicines. The hives will usually fade in a few days, but can last up to 2 weeks.  Home care  Follow these tips:  · Try to find the cause of the hives and eliminate it. Discuss possible causes with your healthcare provider. Future reactions to the same allergen may be worse.  · Dont scratch the hives. Scratching will delay healing. To reduce itching, apply cool, wet compresses to the skin.  · Dress in soft, loose cotton clothing.  · Dont bathe in hot water. This can make the itching worse.  · Apply an ice pack or cool pack wrapped in a thin towel to your skin. This will help reduce redness and itching. But if your hives were caused by exposure to cold, then do not apply more cold to them.  · You may use over-the counter antihistamines to reduce itching. Some older antihistamines, such as diphenhydramine and chlorpheniramine, are inexpensive. But they need to be taken often and may make you sleepy. They are best used at bedtime. Dont use diphenhydramine if you have glaucoma or have trouble urinating because of an enlarged prostate. Newer antihistamines, such as  loratadine, cetirizine, and fexofenadine, are generally more expensive. But they tend to have fewer side effects, such as drowsiness. They can be taken less often.  · Another type of antihistamine is used to treat heartburn. This type includes ranitidine, nizatidine, famotidine, and cimetidine. These are sometimes used along with the above antihistamines if a single medicine is not working.  Follow-up care  Follow up with your healthcare provider if your symptoms don't get better in 2 days. Ask your provider about allergy testing if you have had a severe reaction, or have had several episodes of hives. He or she can use the allergy testing to find out what you are allergic to.  When to seek medical advice  Call your healthcare provider right away if any of these occur:  · Fever of 100.4°F (38.0°C) or higher, or as directed by your healthcare provider  · Redness, swelling, or pain  · Foul-smelling fluid coming from the rash  Call 911  Call 911 if any of the following occur:  · Swelling of the face, throat, or tongue  · Trouble breathing or swallowing  · Dizziness, weakness, or fainting  Date Last Reviewed: 9/1/2016  © 6025-1980 [employer]. [address] [place] [address], [place] [address]. All rights reserved. This information is not intended as a substitute for professional medical care. Always follow your healthcare professional's instructions.

## 2017-12-06 ENCOUNTER — TELEPHONE (OUTPATIENT)
Dept: FAMILY MEDICINE | Facility: CLINIC | Age: 32
End: 2017-12-06

## 2018-01-09 ENCOUNTER — OFFICE VISIT (OUTPATIENT)
Dept: FAMILY MEDICINE | Facility: CLINIC | Age: 33
End: 2018-01-09
Payer: COMMERCIAL

## 2018-01-09 VITALS
BODY MASS INDEX: 28.51 KG/M2 | HEART RATE: 86 BPM | WEIGHT: 177.38 LBS | SYSTOLIC BLOOD PRESSURE: 124 MMHG | HEIGHT: 66 IN | OXYGEN SATURATION: 98 % | DIASTOLIC BLOOD PRESSURE: 86 MMHG | TEMPERATURE: 98 F

## 2018-01-09 DIAGNOSIS — Z00.00 ROUTINE PHYSICAL EXAMINATION: Primary | ICD-10-CM

## 2018-01-09 PROCEDURE — 99395 PREV VISIT EST AGE 18-39: CPT | Mod: S$GLB,,, | Performed by: FAMILY MEDICINE

## 2018-01-09 PROCEDURE — 99999 PR PBB SHADOW E&M-EST. PATIENT-LVL III: CPT | Mod: PBBFAC,,, | Performed by: FAMILY MEDICINE

## 2018-01-09 NOTE — PROGRESS NOTES
[facility]  Progress Note    SUBJECTIVE:     Chief Complaint   Patient presents with    Annual Exam       HPI   Solomon Busby Jr.  is a 32 y.o. male here for routine physical exam. Patient has no other new complaints/problems at this time.      Review of patient's allergies indicates:   Allergen Reactions    Ibuprofen Hives       Past Medical History:   Diagnosis Date    Asthma     Hernia      Past Surgical History:   Procedure Laterality Date    HERNIA REPAIR       Family History   Problem Relation Age of Onset    Diabetes Mother     Hypertension Mother     Carpal tunnel syndrome Father     Liver disease Father     No Known Problems Sister     No Known Problems Brother     Allergies Daughter     Asthma Daughter     No Known Problems Son     No Known Problems Sister     No Known Problems Sister     No Known Problems Brother     No Known Problems Brother      Social History   Substance Use Topics    Smoking status: Never Smoker    Smokeless tobacco: Never Used    Alcohol use No        Review of Systems   Constitutional: Negative for chills, diaphoresis and fever.   HENT: Negative for congestion, ear pain and sore throat.    Eyes: Negative for photophobia and discharge.   Respiratory: Negative for cough, shortness of breath and wheezing.    Cardiovascular: Negative for chest pain and palpitations.   Gastrointestinal: Negative for abdominal pain, constipation, diarrhea, nausea and vomiting.   Genitourinary: Negative for dysuria and hematuria.   Musculoskeletal: Negative for back pain and myalgias.   Skin: Negative for itching and rash.   Neurological: Negative for dizziness, sensory change, focal weakness, weakness and headaches.   All other systems reviewed and are negative.    OBJECTIVE:     Vitals:    01/09/18 1445   BP: 124/86   Pulse: 86   Temp: 98.3 °F (36.8 °C)     Body mass index is 28.63 kg/m².    Physical Exam   Constitutional: He is oriented to person, place, and time and  well-developed, well-nourished, and in no distress. No distress.   HENT:   Head: Normocephalic and atraumatic.   Right Ear: Tympanic membrane is not perforated, not erythematous and not bulging. No hemotympanum.   Left Ear: Tympanic membrane is not perforated, not erythematous and not bulging. No hemotympanum.   Mouth/Throat: Oropharynx is clear and moist. No oropharyngeal exudate.   Eyes: Conjunctivae and EOM are normal. Pupils are equal, round, and reactive to light.   Neck: No thyromegaly present.   Cardiovascular: Normal rate, regular rhythm and normal heart sounds.  Exam reveals no gallop and no friction rub.    No murmur heard.  Pulmonary/Chest: Effort normal and breath sounds normal. No respiratory distress. He has no wheezes. He has no rales.   Abdominal: Soft. Bowel sounds are normal. He exhibits no distension. There is no tenderness. There is no rebound and no guarding.   Musculoskeletal: Normal range of motion. He exhibits no edema or tenderness.   Lymphadenopathy:     He has no cervical adenopathy.   Neurological: He is alert and oriented to person, place, and time.   Skin: Skin is warm. No rash noted. He is not diaphoretic. No erythema.       Old records were reviewed. Labs and/or images were independently reviewed.    ASSESSMENT     1. Routine physical examination        PLAN:     Routine physical examination  -     CBC auto differential; Future  -     Hemoglobin A1c; Future  -     Comprehensive metabolic panel; Future  -     Lipid panel; Future  -     TSH; Future  -     T4, free; Future  -     We briefly discussed diet, exercise, and routine preventive exams. All questions and comments addressed.      RTC PRJUAN M Shaw MD  01/09/2018 3:20 PM

## 2018-01-16 ENCOUNTER — LAB VISIT (OUTPATIENT)
Dept: LAB | Facility: HOSPITAL | Age: 33
End: 2018-01-16
Attending: FAMILY MEDICINE
Payer: COMMERCIAL

## 2018-01-16 DIAGNOSIS — Z00.00 ROUTINE PHYSICAL EXAMINATION: ICD-10-CM

## 2018-01-16 LAB
ALBUMIN SERPL BCP-MCNC: 4 G/DL
ALP SERPL-CCNC: 52 U/L
ALT SERPL W/O P-5'-P-CCNC: 41 U/L
ANION GAP SERPL CALC-SCNC: 7 MMOL/L
AST SERPL-CCNC: 33 U/L
BASOPHILS # BLD AUTO: 0.1 K/UL
BASOPHILS NFR BLD: 1.4 %
BILIRUB SERPL-MCNC: 0.8 MG/DL
BUN SERPL-MCNC: 11 MG/DL
CALCIUM SERPL-MCNC: 9.5 MG/DL
CHLORIDE SERPL-SCNC: 107 MMOL/L
CHOLEST SERPL-MCNC: 152 MG/DL
CHOLEST/HDLC SERPL: 3.3 {RATIO}
CO2 SERPL-SCNC: 24 MMOL/L
CREAT SERPL-MCNC: 1.1 MG/DL
DIFFERENTIAL METHOD: ABNORMAL
EOSINOPHIL # BLD AUTO: 0.2 K/UL
EOSINOPHIL NFR BLD: 2.3 %
ERYTHROCYTE [DISTWIDTH] IN BLOOD BY AUTOMATED COUNT: 13.3 %
EST. GFR  (AFRICAN AMERICAN): >60 ML/MIN/1.73 M^2
EST. GFR  (NON AFRICAN AMERICAN): >60 ML/MIN/1.73 M^2
ESTIMATED AVG GLUCOSE: 108 MG/DL
GLUCOSE SERPL-MCNC: 93 MG/DL
HBA1C MFR BLD HPLC: 5.4 %
HCT VFR BLD AUTO: 44.6 %
HDLC SERPL-MCNC: 46 MG/DL
HDLC SERPL: 30.3 %
HGB BLD-MCNC: 14.5 G/DL
IMM GRANULOCYTES # BLD AUTO: 0.02 K/UL
IMM GRANULOCYTES NFR BLD AUTO: 0.3 %
LDLC SERPL CALC-MCNC: 94.4 MG/DL
LYMPHOCYTES # BLD AUTO: 3 K/UL
LYMPHOCYTES NFR BLD: 43.2 %
MCH RBC QN AUTO: 26.5 PG
MCHC RBC AUTO-ENTMCNC: 32.5 G/DL
MCV RBC AUTO: 81 FL
MONOCYTES # BLD AUTO: 0.5 K/UL
MONOCYTES NFR BLD: 6.8 %
NEUTROPHILS # BLD AUTO: 3.2 K/UL
NEUTROPHILS NFR BLD: 46 %
NONHDLC SERPL-MCNC: 106 MG/DL
NRBC BLD-RTO: 0 /100 WBC
PLATELET # BLD AUTO: 290 K/UL
PMV BLD AUTO: 10.4 FL
POTASSIUM SERPL-SCNC: 4 MMOL/L
PROT SERPL-MCNC: 7.5 G/DL
RBC # BLD AUTO: 5.48 M/UL
SODIUM SERPL-SCNC: 138 MMOL/L
T4 FREE SERPL-MCNC: 0.95 NG/DL
TRIGL SERPL-MCNC: 58 MG/DL
TSH SERPL DL<=0.005 MIU/L-ACNC: 0.57 UIU/ML
WBC # BLD AUTO: 6.92 K/UL

## 2018-01-16 PROCEDURE — 84443 ASSAY THYROID STIM HORMONE: CPT

## 2018-01-16 PROCEDURE — 80053 COMPREHEN METABOLIC PANEL: CPT

## 2018-01-16 PROCEDURE — 84439 ASSAY OF FREE THYROXINE: CPT

## 2018-01-16 PROCEDURE — 80061 LIPID PANEL: CPT

## 2018-01-16 PROCEDURE — 83036 HEMOGLOBIN GLYCOSYLATED A1C: CPT

## 2018-01-16 PROCEDURE — 36415 COLL VENOUS BLD VENIPUNCTURE: CPT | Mod: PO

## 2018-01-16 PROCEDURE — 85025 COMPLETE CBC W/AUTO DIFF WBC: CPT

## 2018-01-23 ENCOUNTER — OFFICE VISIT (OUTPATIENT)
Dept: FAMILY MEDICINE | Facility: CLINIC | Age: 33
End: 2018-01-23
Payer: COMMERCIAL

## 2018-01-23 VITALS
BODY MASS INDEX: 28.19 KG/M2 | SYSTOLIC BLOOD PRESSURE: 104 MMHG | HEIGHT: 66 IN | WEIGHT: 175.38 LBS | TEMPERATURE: 99 F | HEART RATE: 78 BPM | DIASTOLIC BLOOD PRESSURE: 80 MMHG | OXYGEN SATURATION: 96 %

## 2018-01-23 DIAGNOSIS — J32.9 SINUSITIS, UNSPECIFIED CHRONICITY, UNSPECIFIED LOCATION: Primary | ICD-10-CM

## 2018-01-23 PROCEDURE — 99214 OFFICE O/P EST MOD 30 MIN: CPT | Mod: S$GLB,,, | Performed by: NURSE PRACTITIONER

## 2018-01-23 PROCEDURE — 99999 PR PBB SHADOW E&M-EST. PATIENT-LVL IV: CPT | Mod: PBBFAC,,, | Performed by: NURSE PRACTITIONER

## 2018-01-23 RX ORDER — AMOXICILLIN 875 MG/1
875 TABLET, FILM COATED ORAL 2 TIMES DAILY
Qty: 20 TABLET | Refills: 0 | Status: SHIPPED | OUTPATIENT
Start: 2018-01-23 | End: 2018-02-02

## 2018-01-23 NOTE — PROGRESS NOTES
Subjective:       Patient ID: Solomon Busby Jr. is a 32 y.o. male.    Chief Complaint: Headache (4 days); Sore Throat (4 days); Cough (4 days); and Nasal Congestion (4 days)    32-year-old male presents to the clinic today with complaint of chills, headache, sinus congestion, coughing, wheezing, back pain, and dizziness for the past 4 days.  He states he had some diarrhea yesterday.  He denies any fever, body aches, shortness of breath, ear pain, abdominal pain, nausea, or vomiting.  He denies any cardiac chest pain, heart palpitations shortness, or swelling to lower extremities.  He's been taking NyQuil, [place] cold and sinus, TheraFlu, and Excedrin.      Past Medical History:   Diagnosis Date    Asthma     Hernia      Past Surgical History:   Procedure Laterality Date    HERNIA REPAIR        reports that he has never smoked. He has never used smokeless tobacco. He reports that he does not drink alcohol or use drugs.  Review of Systems   Constitutional: Positive for chills. Negative for fatigue and fever.   HENT: Positive for congestion, sinus pressure and sore throat. Negative for ear discharge, ear pain, nosebleeds, postnasal drip, rhinorrhea and sneezing.    Respiratory: Positive for cough and wheezing. Negative for shortness of breath.    Cardiovascular: Negative for chest pain, palpitations and leg swelling.   Gastrointestinal: Positive for diarrhea. Negative for abdominal pain, constipation, nausea and vomiting.   Musculoskeletal: Positive for back pain. Negative for gait problem, neck pain and neck stiffness.   Skin: Negative for color change and rash.   Neurological: Positive for dizziness and headaches. Negative for light-headedness.       Objective:      Physical Exam   Constitutional: He is oriented to person, place, and time. He appears well-developed and well-nourished. No distress.   HENT:   Head: Normocephalic and atraumatic.   Right Ear: External ear normal.   Left Ear: External ear  normal.   Mouth/Throat: Oropharynx is clear and moist. No oropharyngeal exudate.   Both nares red and inflamed with tenderness over both frontal sinuses    Eyes: Conjunctivae and EOM are normal. Pupils are equal, round, and reactive to light. Right eye exhibits no discharge. Left eye exhibits no discharge. No scleral icterus.   Neck: Normal range of motion. Neck supple.   Cardiovascular: Normal rate and regular rhythm.  Exam reveals no gallop and no friction rub.    No murmur heard.  Pulmonary/Chest: Effort normal and breath sounds normal. No respiratory distress. He has no wheezes. He has no rales.   Abdominal: Soft. Bowel sounds are normal. There is no tenderness.   Musculoskeletal: Normal range of motion. He exhibits no edema.   Lymphadenopathy:     He has cervical adenopathy.   Neurological: He is alert and oriented to person, place, and time.   Skin: Skin is warm and dry. No rash noted. He is not diaphoretic.   Psychiatric: He has a normal mood and affect.       Assessment:       1. Sinusitis, unspecified chronicity, unspecified location        Plan:         Sinusitis, unspecified chronicity, unspecified location  -     amoxicillin (AMOXIL) 875 MG tablet; Take 1 tablet (875 mg total) by mouth 2 (two) times daily.  Dispense: 20 tablet; Refill: 0

## 2018-01-23 NOTE — LETTER
January 23, 2018      Solomon Busby   [address]  [address]  [place] [address]             [facility]  [address] [facility]  [place] [address]  Phone: [phone]  Fax: [phone] Solomon Busby    Was treated here on 01/23/2018    Can return to work on 01/24/2018                Jana Baldwin, FNP-C

## 2018-01-23 NOTE — PATIENT INSTRUCTIONS
Sudafed for nasal congestion  Delsym as needed for cough  Alternate Excedrin and Tylenol as needed for headaches  Use Albuterol as needed for wheezing

## 2018-04-08 ENCOUNTER — HOSPITAL ENCOUNTER (EMERGENCY)
Facility: HOSPITAL | Age: 33
Discharge: HOME | End: 2018-04-08
Attending: FAMILY MEDICINE
Payer: COMMERCIAL

## 2018-04-08 VITALS
HEIGHT: 66 IN | WEIGHT: 160 LBS | TEMPERATURE: 100 F | SYSTOLIC BLOOD PRESSURE: 135 MMHG | OXYGEN SATURATION: 97 % | DIASTOLIC BLOOD PRESSURE: 74 MMHG | RESPIRATION RATE: 17 BRPM | BODY MASS INDEX: 25.71 KG/M2 | HEART RATE: 84 BPM

## 2018-04-08 DIAGNOSIS — J11.1 INFLUENZA: Primary | ICD-10-CM

## 2018-04-08 PROCEDURE — 99283 EMERGENCY DEPT VISIT LOW MDM: CPT | Mod: 25

## 2018-04-08 PROCEDURE — 99283 EMERGENCY DEPT VISIT LOW MDM: CPT | Mod: ,,, | Performed by: FAMILY MEDICINE

## 2018-04-08 PROCEDURE — 96372 THER/PROPH/DIAG INJ SC/IM: CPT

## 2018-04-08 PROCEDURE — 25000003 PHARM REV CODE 250: Performed by: FAMILY MEDICINE

## 2018-04-08 PROCEDURE — 63600175 PHARM REV CODE 636 W HCPCS: Performed by: FAMILY MEDICINE

## 2018-04-08 RX ORDER — OSELTAMIVIR PHOSPHATE 75 MG/1
75 CAPSULE ORAL 2 TIMES DAILY
Qty: 10 CAPSULE | Refills: 0 | Status: SHIPPED | OUTPATIENT
Start: 2018-04-08 | End: 2018-04-12

## 2018-04-08 RX ORDER — HYDROCODONE BITARTRATE AND ACETAMINOPHEN 5; 325 MG/1; MG/1
1-2 TABLET ORAL EVERY 6 HOURS PRN
Qty: 12 TABLET | Refills: 0 | Status: SHIPPED | OUTPATIENT
Start: 2018-04-08 | End: 2018-04-12

## 2018-04-08 RX ORDER — ACETAMINOPHEN 325 MG/1
650 TABLET ORAL
Status: COMPLETED | OUTPATIENT
Start: 2018-04-08 | End: 2018-04-08

## 2018-04-08 RX ORDER — DEXAMETHASONE SODIUM PHOSPHATE 4 MG/ML
8 INJECTION, SOLUTION INTRA-ARTICULAR; INTRALESIONAL; INTRAMUSCULAR; INTRAVENOUS; SOFT TISSUE
Status: COMPLETED | OUTPATIENT
Start: 2018-04-08 | End: 2018-04-08

## 2018-04-08 RX ADMIN — DEXAMETHASONE SODIUM PHOSPHATE 8 MG: 4 INJECTION, SOLUTION INTRAMUSCULAR; INTRAVENOUS at 10:04

## 2018-04-08 RX ADMIN — ACETAMINOPHEN 650 MG: 325 TABLET ORAL at 10:04

## 2018-04-09 NOTE — ED NOTES
Patient identifiers verified and correct for Mr Busby  C/C: Headache sore throat  APPEARANCE: awake and alert in NAD.  SKIN: warm, dry and intact. No breakdown or bruising.  MUSCULOSKELETAL: Patient moving all extremities spontaneously, no obvious swelling or deformities noted. Ambulates independently.  RESPIRATORY: Denies shortness of breath.Respirations unlabored.   CARDIAC: Denies CP, 2+ distal pulses; no peripheral edema  ABDOMEN: S/ND/NT, Denies nausea  : voids spontaneously, denies difficulty  Neurologic: AAO x 4; follows commands equal strength in all extremities; denies numbness/tingling. Denies dizziness Positive weakness Positive headache dayan temples, concerned with sore throat

## 2018-04-09 NOTE — ED PROVIDER NOTES
Encounter Date: 4/8/2018       History     Chief Complaint   Patient presents with    Migraine     Pt reports migraine since this morning.    Sore Throat     Pt reports sore throat that began today.     HPI patient is a 32-year-old male with a 1 day history of subjective fever, headache and sore throat.  Sore throat started last night.  The headache and subjective fever this morning.  Patient denies productive cough, nausea or vomiting.  He has a history of asthma controlled with albuterol and a steroid inhaler.  He states that so far.  His asthma has not bothered him in conjunction with these this acute illness.  He did have a flu shot last fall.  He denies known infectious exposures.  No hemoptysis, no change in bowel or bladder habits.  Review of patient's allergies indicates:   Allergen Reactions    Ibuprofen Hives     Past Medical History:   Diagnosis Date    Asthma     Headache     Hernia      Past Surgical History:   Procedure Laterality Date    HERNIA REPAIR       Family History   Problem Relation Age of Onset    Diabetes Mother     Hypertension Mother     Carpal tunnel syndrome Father     Liver disease Father     No Known Problems Sister     No Known Problems Brother     Allergies Daughter     Asthma Daughter     No Known Problems Son     No Known Problems Sister     No Known Problems Sister     No Known Problems Brother     No Known Problems Brother      Social History   Substance Use Topics    Smoking status: Never Smoker    Smokeless tobacco: Never Used    Alcohol use No     Review of Systems   Constitutional: Positive for chills and fever.   HENT: Positive for sore throat. Negative for trouble swallowing.    Eyes: Negative for photophobia.   Respiratory: Negative for chest tightness and shortness of breath.    Cardiovascular: Negative for chest pain.   Gastrointestinal: Negative for abdominal distention, diarrhea and vomiting.   Genitourinary: Negative for frequency and hematuria.    Musculoskeletal: Positive for arthralgias.   Neurological: Positive for dizziness and headaches. Negative for tremors, seizures, syncope and speech difficulty.   Hematological: Negative.        Physical Exam     Initial Vitals [04/08/18 2059]   BP Pulse Resp Temp SpO2   135/74 84 17 100.1 °F (37.8 °C) 97 %      MAP       94.33         Physical Exam    Nursing note and vitals reviewed.  Constitutional: He appears well-developed and well-nourished.   HENT:   Head: Normocephalic.   Mouth/Throat: Oropharynx is clear and moist.   Throat without exudate or tonsillar enlargement   Eyes: EOM are normal. Pupils are equal, round, and reactive to light.   Neck: Normal range of motion. Neck supple. No thyromegaly present. No tracheal deviation present.   Cardiovascular: Normal rate and regular rhythm.   Pulmonary/Chest: Breath sounds normal. He has no wheezes. He has no rhonchi. He has no rales.   Abdominal: Soft. Bowel sounds are normal.   Musculoskeletal: Normal range of motion.   Lymphadenopathy:     He has cervical adenopathy.   Neurological: He is alert and oriented to person, place, and time.   Skin: Skin is warm and dry.         ED Course   Procedures  Labs Reviewed - No data to display          Medical Decision Making:   ED Management:  Patient's symptoms are most consistent with the current influenza symptoms in the community.  Cousin the patient's history of asthma.  We will start him on Tamiflu twice a day for 5 days.  Decadron injection symptomatic relief and to assist in preventing asthma flare symptomatic control.  Patient to be off work until fever resolves.  Follow-up with PCP if not improved in 2-3 days                      Clinical Impression:   The encounter diagnosis was Influenza.                           Chapincito Livingston MD  04/08/18 2917

## 2018-04-09 NOTE — ED TRIAGE NOTES
Patient states headache with known migraines onset last night, also concerned with sore throat, Has tried OTC meds for sore throat, unknown fever. Excedrin 6 hours PTA

## 2018-04-12 ENCOUNTER — OFFICE VISIT (OUTPATIENT)
Dept: FAMILY MEDICINE | Facility: CLINIC | Age: 33
End: 2018-04-12
Payer: COMMERCIAL

## 2018-04-12 VITALS
WEIGHT: 180 LBS | SYSTOLIC BLOOD PRESSURE: 130 MMHG | OXYGEN SATURATION: 97 % | HEART RATE: 79 BPM | BODY MASS INDEX: 28.93 KG/M2 | HEIGHT: 66 IN | TEMPERATURE: 98 F | DIASTOLIC BLOOD PRESSURE: 70 MMHG

## 2018-04-12 DIAGNOSIS — J06.9 VIRAL URI: Primary | ICD-10-CM

## 2018-04-12 PROCEDURE — 99999 PR PBB SHADOW E&M-EST. PATIENT-LVL III: CPT | Mod: PBBFAC,,, | Performed by: FAMILY MEDICINE

## 2018-04-12 PROCEDURE — 99214 OFFICE O/P EST MOD 30 MIN: CPT | Mod: S$GLB,,, | Performed by: FAMILY MEDICINE

## 2018-04-12 RX ORDER — CODEINE PHOSPHATE AND GUAIFENESIN 10; 100 MG/5ML; MG/5ML
5 SOLUTION ORAL EVERY 8 HOURS PRN
Qty: 180 ML | Refills: 0 | Status: SHIPPED | OUTPATIENT
Start: 2018-04-12 | End: 2018-04-22

## 2018-04-12 NOTE — PROGRESS NOTES
[facility]  Progress Note    SUBJECTIVE:     Chief Complaint   Patient presents with    Sore Throat       HPI   Solomon Busby Jr.  is a 32 y.o. male here for c/o sore throat. He went to ER recently and was dx with influenza and is being treated empirically with tamiflu/steroids. Feeling better. No fevers for the past couple days. Patient has no other new complaints/problems at this time.      Review of patient's allergies indicates:   Allergen Reactions    Ibuprofen Hives       Past Medical History:   Diagnosis Date    Asthma     Headache     Hernia      Past Surgical History:   Procedure Laterality Date    HERNIA REPAIR       Family History   Problem Relation Age of Onset    Diabetes Mother     Hypertension Mother     Carpal tunnel syndrome Father     Liver disease Father     No Known Problems Sister     No Known Problems Brother     Allergies Daughter     Asthma Daughter     No Known Problems Son     No Known Problems Sister     No Known Problems Sister     No Known Problems Brother     No Known Problems Brother      Social History   Substance Use Topics    Smoking status: Never Smoker    Smokeless tobacco: Never Used    Alcohol use No        Review of Systems   Constitutional: Positive for malaise/fatigue. Negative for chills and fever.   HENT: Positive for congestion and sore throat.    Respiratory: Positive for cough. Negative for sputum production, shortness of breath and wheezing.    Musculoskeletal: Negative for joint pain, myalgias and neck pain.     OBJECTIVE:     Vitals:    04/12/18 1349   BP: 130/70   Pulse: 79   Temp: 98.4 °F (36.9 °C)     Body mass index is 29.05 kg/m².    Physical Exam   Constitutional: He is oriented to person, place, and time. He appears distressed (mild).   HENT:   Head: Normocephalic and atraumatic.   Right Ear: External ear normal. Tympanic membrane is not perforated, not erythematous, not retracted and not bulging. No hemotympanum.   Left Ear:  External ear normal. Tympanic membrane is not perforated, not erythematous, not retracted and not bulging. No hemotympanum.   Nose: Nose normal.   Mouth/Throat: Oropharynx is clear and moist. No oropharyngeal exudate.   + erythemic posterior pharynx   Eyes: Conjunctivae and EOM are normal.   Cardiovascular: Normal rate, regular rhythm and normal heart sounds.  Exam reveals no gallop and no friction rub.    No murmur heard.  Pulmonary/Chest: Effort normal and breath sounds normal. No respiratory distress. He has no wheezes. He has no rales. He exhibits no tenderness.   Abdominal: Soft. Bowel sounds are normal. He exhibits no distension. There is no tenderness. There is no rebound.   Neurological: He is alert and oriented to person, place, and time.   Skin: Skin is warm. He is not diaphoretic.       Old records were reviewed. Labs and/or images were independently reviewed.    ASSESSMENT     1. Viral URI        PLAN:     Viral URI  -     guaifenesin-codeine 100-10 mg/5 ml (CHERATUSSIN AC)  mg/5 mL syrup; Take 5 mLs by mouth every 8 (eight) hours as needed for Cough or Congestion.  Dispense: 180 mL; Refill: 0  -     OK to take tylenol as needed PRN fever. Take mucinex and or claritin to help decrease congestion. Educated patient to drink plenty of fluids. Instructed patient to call or RTC if symptoms persist or worsen.  -     Ok to finish treatment course from ER (tamiflu)      RTC PRN    Kaushik Shaw MD  04/12/2018 2:02 PM

## 2018-06-28 ENCOUNTER — NURSE TRIAGE (OUTPATIENT)
Dept: ADMINISTRATIVE | Facility: CLINIC | Age: 33
End: 2018-06-28

## 2018-06-28 NOTE — TELEPHONE ENCOUNTER
Reason for Disposition   Chest pain    Protocols used: ST ASTHMA ATTACK-A-[facility]    Solomon is calling to report he is having asthma and using his nebulizer and inhaler but continues with shortness of breath.  Reports chest pain.  Per protocol advised ER.  Agrees to go to ER.

## 2018-07-11 ENCOUNTER — OFFICE VISIT (OUTPATIENT)
Dept: FAMILY MEDICINE | Facility: CLINIC | Age: 33
End: 2018-07-11
Payer: COMMERCIAL

## 2018-07-11 VITALS
WEIGHT: 181.13 LBS | DIASTOLIC BLOOD PRESSURE: 74 MMHG | BODY MASS INDEX: 29.11 KG/M2 | HEIGHT: 66 IN | SYSTOLIC BLOOD PRESSURE: 116 MMHG | TEMPERATURE: 98 F | HEART RATE: 72 BPM | OXYGEN SATURATION: 97 %

## 2018-07-11 DIAGNOSIS — J45.20 MILD INTERMITTENT ASTHMA WITHOUT COMPLICATION: ICD-10-CM

## 2018-07-11 DIAGNOSIS — J45.901 EXACERBATION OF ASTHMA, UNSPECIFIED ASTHMA SEVERITY, UNSPECIFIED WHETHER PERSISTENT: Primary | ICD-10-CM

## 2018-07-11 DIAGNOSIS — L30.9 DERMATITIS: ICD-10-CM

## 2018-07-11 PROCEDURE — 99999 PR PBB SHADOW E&M-EST. PATIENT-LVL III: CPT | Mod: PBBFAC,,, | Performed by: FAMILY MEDICINE

## 2018-07-11 PROCEDURE — 99214 OFFICE O/P EST MOD 30 MIN: CPT | Mod: 25,S$GLB,, | Performed by: FAMILY MEDICINE

## 2018-07-11 PROCEDURE — 94640 AIRWAY INHALATION TREATMENT: CPT | Mod: 59,S$GLB,, | Performed by: FAMILY MEDICINE

## 2018-07-11 PROCEDURE — 3008F BODY MASS INDEX DOCD: CPT | Mod: CPTII,S$GLB,, | Performed by: FAMILY MEDICINE

## 2018-07-11 PROCEDURE — 96372 THER/PROPH/DIAG INJ SC/IM: CPT | Mod: S$GLB,,, | Performed by: FAMILY MEDICINE

## 2018-07-11 RX ORDER — ALBUTEROL SULFATE 90 UG/1
2 AEROSOL, METERED RESPIRATORY (INHALATION) EVERY 6 HOURS PRN
Qty: 1 INHALER | Refills: 3 | Status: SHIPPED | OUTPATIENT
Start: 2018-07-11 | End: 2018-07-12 | Stop reason: SDUPTHER

## 2018-07-11 RX ORDER — PREDNISONE 20 MG/1
60 TABLET ORAL DAILY
Qty: 15 TABLET | Refills: 0 | Status: SHIPPED | OUTPATIENT
Start: 2018-07-11 | End: 2018-07-12 | Stop reason: SDUPTHER

## 2018-07-11 RX ORDER — IPRATROPIUM BROMIDE AND ALBUTEROL SULFATE 2.5; .5 MG/3ML; MG/3ML
3 SOLUTION RESPIRATORY (INHALATION)
Status: COMPLETED | OUTPATIENT
Start: 2018-07-11 | End: 2018-07-11

## 2018-07-11 RX ORDER — TRIAMCINOLONE ACETONIDE 1 MG/G
OINTMENT TOPICAL 2 TIMES DAILY
Qty: 30 G | Refills: 0 | Status: SHIPPED | OUTPATIENT
Start: 2018-07-11 | End: 2018-07-12 | Stop reason: SDUPTHER

## 2018-07-11 RX ADMIN — IPRATROPIUM BROMIDE AND ALBUTEROL SULFATE 3 ML: 2.5; .5 SOLUTION RESPIRATORY (INHALATION) at 11:07

## 2018-07-11 NOTE — PROGRESS NOTES
[facility]  Progress Note    SUBJECTIVE:     Chief Complaint   Patient presents with    Asthma       HPI   Solomon Busby Jr.  is a 32 y.o. male here for c/o shortness of breath. This has been going on for the past 4-5 days, and gradually worsening. Been doing duonebs at home with minimal help. Has been using his maintenance inhalers as prescribed. No fevers, chills, cough. No known sick contacts.     Review of patient's allergies indicates:   Allergen Reactions    Ibuprofen Hives       Past Medical History:   Diagnosis Date    Asthma     Headache     Hernia      Past Surgical History:   Procedure Laterality Date    HERNIA REPAIR       Family History   Problem Relation Age of Onset    Diabetes Mother     Hypertension Mother     Carpal tunnel syndrome Father     Liver disease Father     No Known Problems Sister     No Known Problems Brother     Allergies Daughter     Asthma Daughter     No Known Problems Son     No Known Problems Sister     No Known Problems Sister     No Known Problems Brother     No Known Problems Brother      Social History   Substance Use Topics    Smoking status: Never Smoker    Smokeless tobacco: Never Used    Alcohol use No        Review of Systems   Constitutional: Positive for malaise/fatigue. Negative for chills and fever.   HENT: Negative.    Respiratory: Positive for shortness of breath and wheezing. Negative for cough.    Cardiovascular: Negative.  Negative for chest pain.   Gastrointestinal: Negative.  Negative for abdominal pain, nausea and vomiting.   Genitourinary: Negative.    Neurological: Negative for weakness and headaches.   All other systems reviewed and are negative.    OBJECTIVE:     Vitals:    07/11/18 1109   BP: 116/74   Pulse: 72   Temp: 98.2 °F (36.8 °C)     Body mass index is 29.23 kg/m².    Physical Exam   Constitutional: He is oriented to person, place, and time. He appears distressed (mild).   HENT:   Head: Normocephalic and atraumatic.    Nose: Nose normal.   Eyes: Conjunctivae and EOM are normal.   Cardiovascular: Normal rate, regular rhythm and normal heart sounds.  Exam reveals no gallop and no friction rub.    No murmur heard.  Pulmonary/Chest: He is in respiratory distress (mild). He has wheezes (b/l lobes). He has no rales. He exhibits no tenderness.   Abdominal: Soft. Bowel sounds are normal. He exhibits no distension. There is no tenderness. There is no rebound.   Neurological: He is alert and oriented to person, place, and time.   Skin: Skin is warm. He is not diaphoretic.       Old records were reviewed. Labs and/or images were independently reviewed.    ASSESSMENT     1. Exacerbation of asthma, unspecified asthma severity, unspecified whether persistent    2. Mild intermittent asthma without complication    3. Dermatitis        PLAN:     Exacerbation of asthma, unspecified asthma severity, unspecified whether persistent  -     albuterol-ipratropium 2.5 mg-0.5 mg/3 mL nebulizer solution 3 mL; Take 3 mLs by nebulization one time.  -     methylPREDNISolone sodium succinate injection 80 mg; Inject 80 mg into the muscle once.  -     Start predniSONE (DELTASONE) 20 MG tablet; Take 3 tablets (60 mg total) by mouth once daily. for 5 days  Dispense: 15 tablet; Refill: 0  -     Recommend patient use duoneb up to 5x a day for the next week. Take medications as prescribed.    Mild intermittent asthma without complication  -     beclomethasone (QVAR) 40 mcg/actuation Aero; Inhale 1 puff into the lungs 2 (two) times daily.  Dispense: 120 each; Refill: 3  -     albuterol 90 mcg/actuation inhaler; Inhale 2 puffs into the lungs every 6 (six) hours as needed for Wheezing or Shortness of Breath.  Dispense: 1 Inhaler; Refill: 3  -     No changes to his maintenance medications at this time, as first flare up this year.    Dermatitis  -     triamcinolone acetonide 0.1% (KENALOG) 0.1 % ointment; Apply topically 2 (two) times daily. for 10 days  Dispense: 30 g;  Refill: 0      RTC PRN    Kaushik Shaw MD  07/11/2018 11:24 AM

## 2018-07-11 NOTE — PROGRESS NOTES
Injection and Breathing Tx given.  Tolerated well, instructed to wait 15 min for observation. No reaction noted at discharge.

## 2018-07-12 DIAGNOSIS — J45.901 EXACERBATION OF ASTHMA, UNSPECIFIED ASTHMA SEVERITY, UNSPECIFIED WHETHER PERSISTENT: ICD-10-CM

## 2018-07-12 DIAGNOSIS — L30.9 DERMATITIS: ICD-10-CM

## 2018-07-12 DIAGNOSIS — J45.20 MILD INTERMITTENT ASTHMA WITHOUT COMPLICATION: ICD-10-CM

## 2018-07-12 RX ORDER — ALBUTEROL SULFATE 90 UG/1
2 AEROSOL, METERED RESPIRATORY (INHALATION) EVERY 6 HOURS PRN
Qty: 1 INHALER | Refills: 3 | Status: SHIPPED | OUTPATIENT
Start: 2018-07-12 | End: 2019-07-25 | Stop reason: SDUPTHER

## 2018-07-12 RX ORDER — TRIAMCINOLONE ACETONIDE 1 MG/G
OINTMENT TOPICAL 2 TIMES DAILY
Qty: 30 G | Refills: 0 | Status: SHIPPED | OUTPATIENT
Start: 2018-07-12 | End: 2020-09-21 | Stop reason: CLARIF

## 2018-07-12 RX ORDER — PREDNISONE 20 MG/1
60 TABLET ORAL DAILY
Qty: 15 TABLET | Refills: 0 | Status: SHIPPED | OUTPATIENT
Start: 2018-07-12 | End: 2018-07-17

## 2018-07-12 NOTE — TELEPHONE ENCOUNTER
----- Message from Fabrizio Del Toro sent at 7/12/2018  9:54 AM CDT -----  Contact: Self/[phone]  Patient stated that the pharmacy his prescriptions was sent to is no longer open. He would like to know if he can  the prescriptions in office today. The patient would like to be contacted regarding this matter.    Refill: albuterol 90 mcg/actuation inhaler  Refill: beclomethasone (QVAR) 40 mcg/actuation Aero  Refill: predniSONE (DELTASONE) 20 MG tablet  Refill: triamcinolone acetonide 0.1% (KENALOG) 0.1 % ointment    Thank you

## 2018-08-08 ENCOUNTER — OFFICE VISIT (OUTPATIENT)
Dept: FAMILY MEDICINE | Facility: CLINIC | Age: 33
End: 2018-08-08
Payer: COMMERCIAL

## 2018-08-08 VITALS
TEMPERATURE: 99 F | HEIGHT: 66 IN | WEIGHT: 185.19 LBS | DIASTOLIC BLOOD PRESSURE: 70 MMHG | HEART RATE: 64 BPM | BODY MASS INDEX: 29.76 KG/M2 | SYSTOLIC BLOOD PRESSURE: 118 MMHG

## 2018-08-08 DIAGNOSIS — M79.604 RIGHT LEG PAIN: Primary | ICD-10-CM
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
## 2018-08-08 PROCEDURE — 99999 PR PBB SHADOW E&M-EST. PATIENT-LVL III: CPT | Mod: PBBFAC,,, | Performed by: FAMILY MEDICINE

## 2018-08-08 PROCEDURE — 3008F BODY MASS INDEX DOCD: CPT | Mod: CPTII,S$GLB,, | Performed by: FAMILY MEDICINE

## 2018-08-08 PROCEDURE — 99214 OFFICE O/P EST MOD 30 MIN: CPT | Mod: 25,S$GLB,, | Performed by: FAMILY MEDICINE

## 2018-08-08 PROCEDURE — 96372 THER/PROPH/DIAG INJ SC/IM: CPT | Mod: S$GLB,,, | Performed by: FAMILY MEDICINE

## 2018-08-08 RX ORDER — KETOROLAC TROMETHAMINE 30 MG/ML
30 INJECTION, SOLUTION INTRAMUSCULAR; INTRAVENOUS ONCE
Status: COMPLETED | OUTPATIENT
Start: 2018-08-08 | End: 2018-08-08

## 2018-08-08 RX ORDER — NAPROXEN 500 MG/1
500 TABLET ORAL 2 TIMES DAILY PRN
Qty: 30 TABLET | Refills: 0 | Status: SHIPPED | OUTPATIENT
Start: 2018-08-08 | End: 2018-11-13 | Stop reason: SDUPTHER

## 2018-08-08 RX ADMIN — KETOROLAC TROMETHAMINE 30 MG: 30 INJECTION, SOLUTION INTRAMUSCULAR; INTRAVENOUS at 11:08

## 2018-08-08 NOTE — PROGRESS NOTES
[facility]  Progress Note    SUBJECTIVE:     Chief Complaint   Patient presents with    Back Pain       HPI   Solomon Busby Jr.  is a 32 y.o. male here for c/o right buttocks pain. This started over a week ago. Rates pain as moderate-severe at times. Certain positions make it worst. No falls/trauma. Hasn't tried anything for it. Pain does not radiate.     Review of patient's allergies indicates:   Allergen Reactions    Ibuprofen Hives       Past Medical History:   Diagnosis Date    Asthma     Headache     Hernia      Past Surgical History:   Procedure Laterality Date    HERNIA REPAIR       Family History   Problem Relation Age of Onset    Diabetes Mother     Hypertension Mother     Carpal tunnel syndrome Father     Liver disease Father     No Known Problems Sister     No Known Problems Brother     Allergies Daughter     Asthma Daughter     No Known Problems Son     No Known Problems Sister     No Known Problems Sister     No Known Problems Brother     No Known Problems Brother      Social History   Substance Use Topics    Smoking status: Never Smoker    Smokeless tobacco: Never Used    Alcohol use No        Review of Systems   Constitutional: Negative for chills, fever and malaise/fatigue.   HENT: Negative.    Respiratory: Negative.  Negative for cough and shortness of breath.    Cardiovascular: Negative.  Negative for chest pain.   Gastrointestinal: Negative.  Negative for abdominal pain, nausea and vomiting.   Genitourinary: Negative.    Musculoskeletal: Positive for joint pain (R hip).   Neurological: Negative for weakness and headaches.   All other systems reviewed and are negative.    OBJECTIVE:     Vitals:    08/08/18 1120   BP: 118/70   Pulse: 64   Temp: 98.5 °F (36.9 °C)     Body mass index is 29.89 kg/m².    Physical Exam   Constitutional: He is oriented to person, place, and time and well-developed, well-nourished, and in no distress. No distress.   HENT:   Head:  Normocephalic and atraumatic.   Nose: Nose normal.   Eyes: Conjunctivae and EOM are normal.   Cardiovascular: Normal rate, regular rhythm and normal heart sounds.  Exam reveals no gallop and no friction rub.    No murmur heard.  Pulmonary/Chest: Effort normal and breath sounds normal. No respiratory distress. He has no wheezes. He has no rales. He exhibits no tenderness.   Abdominal: Soft. Bowel sounds are normal. He exhibits no distension. There is no tenderness. There is no rebound.   Musculoskeletal: He exhibits tenderness (to palpation of right gluteal muscle. no obvious fractures, dislocations. good ROM of R hip.).   Neurological: He is alert and oriented to person, place, and time.   Skin: Skin is warm. He is not diaphoretic.       Old records were reviewed. Labs and/or images were independently reviewed.    ASSESSMENT     1. Right leg pain        PLAN:     Right leg pain  -     Start naproxen (NAPROSYN) 500 MG tablet; Take 1 tablet (500 mg total) by mouth 2 (two) times daily as needed.  Dispense: 30 tablet; Refill: 0  -     ketorolac injection 30 mg; Inject 1 mL (30 mg total) into the muscle once.  -     Patient counseled on good posture and stretching exercises. Continue to place ice packs on affected areas 3-4 times daily. Take medications as prescribed. Instructed patient to call MD if symptoms persist or worsen.      RTC PRN    Kaushik Shaw MD  08/08/2018 11:34 AM

## 2018-08-08 NOTE — LETTER
August 8, 2018      [facility]  [address]  [place] [address]  Phone: [phone]  Fax: [phone]       Patient: Solomon Busby   YOB: 1985  Date of Visit: 08/08/2018    To Whom It May Concern:    Charles Busby  was at [facility] on 08/08/2018. He may return to work/school on 8/9/18 without restrictions. If you have any questions or concerns, or if I can be of further assistance, please do not hesitate to contact me.    Sincerely,    Kaushik Shaw MD

## 2018-08-16 ENCOUNTER — OFFICE VISIT (OUTPATIENT)
Dept: FAMILY MEDICINE | Facility: CLINIC | Age: 33
End: 2018-08-16
Payer: COMMERCIAL

## 2018-08-16 VITALS
HEIGHT: 66 IN | DIASTOLIC BLOOD PRESSURE: 80 MMHG | SYSTOLIC BLOOD PRESSURE: 108 MMHG | OXYGEN SATURATION: 97 % | WEIGHT: 181.19 LBS | BODY MASS INDEX: 29.12 KG/M2 | TEMPERATURE: 98 F | HEART RATE: 68 BPM

## 2018-08-16 DIAGNOSIS — J02.9 PHARYNGITIS, UNSPECIFIED ETIOLOGY: Primary | ICD-10-CM

## 2018-08-16 DIAGNOSIS — J02.9 SORE THROAT: ICD-10-CM

## 2018-08-16 LAB
CTP QC/QA: YES
S PYO RRNA THROAT QL PROBE: NEGATIVE

## 2018-08-16 PROCEDURE — 87880 STREP A ASSAY W/OPTIC: CPT | Mod: QW,S$GLB,, | Performed by: NURSE PRACTITIONER

## 2018-08-16 PROCEDURE — 99999 PR PBB SHADOW E&M-EST. PATIENT-LVL IV: CPT | Mod: PBBFAC,,, | Performed by: NURSE PRACTITIONER

## 2018-08-16 PROCEDURE — 99214 OFFICE O/P EST MOD 30 MIN: CPT | Mod: S$GLB,,, | Performed by: NURSE PRACTITIONER

## 2018-08-16 PROCEDURE — 3008F BODY MASS INDEX DOCD: CPT | Mod: CPTII,S$GLB,, | Performed by: NURSE PRACTITIONER

## 2018-08-16 RX ORDER — LEVOCETIRIZINE DIHYDROCHLORIDE 5 MG/1
5 TABLET, FILM COATED ORAL NIGHTLY
Qty: 30 TABLET | Refills: 11 | Status: SHIPPED | OUTPATIENT
Start: 2018-08-16 | End: 2020-09-21 | Stop reason: CLARIF

## 2018-08-16 RX ORDER — METHYLPREDNISOLONE 4 MG/1
TABLET ORAL
Qty: 1 PACKAGE | Refills: 0 | Status: SHIPPED | OUTPATIENT
Start: 2018-08-16 | End: 2018-09-06

## 2018-08-16 NOTE — LETTER
August 16, 2018      [facility]  [address]  [place] [address]  Phone: [phone]  Fax: [phone]       Patient: Solomon Busby   YOB: 1985  Date of Visit: 08/16/2018    To Whom It May Concern:    Charles Busby  was at [facility] on 08/16/2018. He may return to work/school on 08/17/2018 with no restrictions. If you have any questions or concerns, or if I can be of further assistance, please do not hesitate to contact me.    Sincerely,      Arnol Burrows, NP

## 2018-08-16 NOTE — PROGRESS NOTES
Subjective:       Patient ID: Solomon Busby Jr. is a 32 y.o. male.    Chief Complaint: Sore Throat    Sore Throat    This is a new problem. The current episode started yesterday. The problem has been gradually worsening. There has been no fever. The pain is at a severity of 8/10. The pain is moderate. Associated symptoms include headaches, swollen glands and trouble swallowing (hurts to swallow). Pertinent negatives include no abdominal pain, congestion, coughing, diarrhea, drooling, ear discharge, ear pain, hoarse voice, plugged ear sensation, neck pain, shortness of breath, stridor or vomiting. He has had no exposure to strep or mono. He has tried nothing for the symptoms.     Review of Systems   HENT: Positive for sore throat and trouble swallowing (hurts to swallow). Negative for congestion, drooling, ear discharge, ear pain and hoarse voice.    Respiratory: Negative for cough, shortness of breath and stridor.    Gastrointestinal: Negative for abdominal pain, diarrhea and vomiting.   Musculoskeletal: Negative for neck pain.   Neurological: Positive for headaches.       Objective:      Physical Exam   Constitutional: He is oriented to person, place, and time. Vital signs are normal. He appears well-developed and well-nourished.   HENT:   Head: Normocephalic and atraumatic.   Right Ear: Tympanic membrane, external ear and ear canal normal.   Left Ear: Tympanic membrane, external ear and ear canal normal.   Nose: Mucosal edema and rhinorrhea present. Right sinus exhibits frontal sinus tenderness. Right sinus exhibits no maxillary sinus tenderness. Left sinus exhibits frontal sinus tenderness. Left sinus exhibits no maxillary sinus tenderness.   Mouth/Throat: Uvula is midline and mucous membranes are normal. Posterior oropharyngeal erythema present. No oropharyngeal exudate.   Cardiovascular: Normal rate, regular rhythm and normal heart sounds.   Pulmonary/Chest: Effort normal and breath sounds normal. No  respiratory distress. He has no wheezes. He has no rales.   Lymphadenopathy:     He has cervical adenopathy.   Neurological: He is alert and oriented to person, place, and time.   Skin: Skin is warm, dry and intact. No rash noted.   Psychiatric: He has a normal mood and affect.   Vitals reviewed.      Assessment:       1. Pharyngitis, unspecified etiology    2. Sore throat        Plan:       Solomon was seen today for sore throat.    Diagnoses and all orders for this visit:    Pharyngitis, unspecified etiology  -     methylPREDNISolone (MEDROL DOSEPACK) 4 mg tablet; use as directed  -     levocetirizine (XYZAL) 5 MG tablet; Take 1 tablet (5 mg total) by mouth every evening.    Sore throat  -     POCT Rapid Strep A    Home care  · If your symptoms are severe, rest at home. Return to work or school when you feel well enough.   · Drink plenty of fluids to avoid dehydration.  · For children: Use acetaminophen for fever, fussiness or discomfort. In infants over six months of age, you may use ibuprofen instead of acetaminophen. (NOTE: If your child has chronic liver or kidney disease or ever had a stomach ulcer or GI bleeding, talk with your doctor before using these medicines.) (NOTE: Aspirin should never be used in anyone under 18 years of age who is ill with a fever. It may cause severe liver damage.)   · For adults: You may use acetaminophen or ibuprofen to control pain or fever, unless another medicine was prescribed for this. (NOTE: If you have chronic liver or kidney disease or ever had a stomach ulcer or GI bleeding, talk with your doctor before using these medicines.)  · Throat lozenges or numbing throat sprays can help reduce pain. Gargling with warm salt water will also help reduce throat pain. For this, dissolve 1/2 teaspoon of salt in 1 glass of warm water. To help soothe a sore throat, children can sip on juice or a popsicle. Children 5 years and older can also suck on a lollipop or hard candy.  · Avoid salty  or spicy foods, which can be irritating to the throat.  Follow-up care  Follow up with your healthcare provider or our staff if you are not improving over the next week.  When to seek medical advice  Call your healthcare provider right away if any of these occur:  · Fever as directed by your doctor.  For children, seek care if:  ¨ Your child is of any age and has repeated fevers above 104°F (40°C).  ¨ Your child is younger than 2 years of age and has a fever of 100.4°F (38°C) that continues for more than 1 day.  ¨ Your child is 2 years old or older and has a fever of 100.4°F (38°C) that continues for more than 3 days.  · New or worsening ear pain, sinus pain, or headache  · Painful lumps in the back of neck  · Stiff neck  · Lymph nodes are getting larger  · Inability to swallow liquids, excessive drooling, or inability to open mouth wide due to throat pain  · Signs of dehydration (very dark urine or no urine, sunken eyes, dizziness)  · Trouble breathing or noisy breathing  · Muffled voice  · New rash  · Child appears to be getting sicker  Date Last Reviewed: 4/13/2015  © 6404-0653 [employer]. [address] [place] [address], [place] [address]. All rights reserved. This information is not intended as a substitute for professional medical care. Always follow your healthcare professional's instructions.

## 2018-08-16 NOTE — PATIENT INSTRUCTIONS
Viral Pharyngitis (Sore Throat)    You (or your child, if your child is the patient) have pharyngitis (sore throat). This infection is caused by a virus. It can cause throat pain that is worse when swallowing, aching all over, headache, and fever. The infection may be spread by coughing, kissing, or touching others after touching your mouth or nose. Antibiotic medications do not work against viruses, so they are not used for treating this condition.  Home care  · If your symptoms are severe, rest at home. Return to work or school when you feel well enough.   · Drink plenty of fluids to avoid dehydration.  · For children: Use acetaminophen for fever, fussiness or discomfort. In infants over six months of age, you may use ibuprofen instead of acetaminophen. (NOTE: If your child has chronic liver or kidney disease or ever had a stomach ulcer or GI bleeding, talk with your doctor before using these medicines.) (NOTE: Aspirin should never be used in anyone under 18 years of age who is ill with a fever. It may cause severe liver damage.)   · For adults: You may use acetaminophen or ibuprofen to control pain or fever, unless another medicine was prescribed for this. (NOTE: If you have chronic liver or kidney disease or ever had a stomach ulcer or GI bleeding, talk with your doctor before using these medicines.)  · Throat lozenges or numbing throat sprays can help reduce pain. Gargling with warm salt water will also help reduce throat pain. For this, dissolve 1/2 teaspoon of salt in 1 glass of warm water. To help soothe a sore throat, children can sip on juice or a popsicle. Children 5 years and older can also suck on a lollipop or hard candy.  · Avoid salty or spicy foods, which can be irritating to the throat.  Follow-up care  Follow up with your healthcare provider or our staff if you are not improving over the next week.  When to seek medical advice  Call your healthcare provider right away if any of these  occur:  · Fever as directed by your doctor.  For children, seek care if:  ¨ Your child is of any age and has repeated fevers above 104°F (40°C).  ¨ Your child is younger than 2 years of age and has a fever of 100.4°F (38°C) that continues for more than 1 day.  ¨ Your child is 2 years old or older and has a fever of 100.4°F (38°C) that continues for more than 3 days.  · New or worsening ear pain, sinus pain, or headache  · Painful lumps in the back of neck  · Stiff neck  · Lymph nodes are getting larger  · Inability to swallow liquids, excessive drooling, or inability to open mouth wide due to throat pain  · Signs of dehydration (very dark urine or no urine, sunken eyes, dizziness)  · Trouble breathing or noisy breathing  · Muffled voice  · New rash  · Child appears to be getting sicker  Date Last Reviewed: 4/13/2015  © 4236-8776 [employer]. [address] [place] [address], [place] [address]. All rights reserved. This information is not intended as a substitute for professional medical care. Always follow your healthcare professional's instructions.

## 2018-10-28 ENCOUNTER — HOSPITAL ENCOUNTER (EMERGENCY)
Facility: HOSPITAL | Age: 33
Discharge: HOME | End: 2018-10-28
Attending: INTERNAL MEDICINE
Payer: COMMERCIAL

## 2018-10-28 VITALS
HEART RATE: 58 BPM | OXYGEN SATURATION: 98 % | WEIGHT: 170 LBS | RESPIRATION RATE: 16 BRPM | BODY MASS INDEX: 27.32 KG/M2 | HEIGHT: 66 IN | SYSTOLIC BLOOD PRESSURE: 147 MMHG | TEMPERATURE: 98 F | DIASTOLIC BLOOD PRESSURE: 73 MMHG

## 2018-10-28 DIAGNOSIS — M54.6 THORACIC BACK PAIN: ICD-10-CM

## 2018-10-28 DIAGNOSIS — M54.50 LUMBAR BACK PAIN: ICD-10-CM

## 2018-10-28 DIAGNOSIS — Z04.1 EXAM FOLLOWING MVC (MOTOR VEHICLE COLLISION), NO APPARENT INJURY: Primary | ICD-10-CM

## 2018-10-28 PROCEDURE — 25000003 PHARM REV CODE 250: Performed by: INTERNAL MEDICINE

## 2018-10-28 PROCEDURE — 99283 EMERGENCY DEPT VISIT LOW MDM: CPT | Mod: 25

## 2018-10-28 RX ORDER — METHOCARBAMOL 750 MG/1
1500 TABLET, FILM COATED ORAL 3 TIMES DAILY
Qty: 30 TABLET | Refills: 0 | Status: SHIPPED | OUTPATIENT
Start: 2018-10-28 | End: 2018-11-02

## 2018-10-28 RX ORDER — ACETAMINOPHEN 500 MG
1000 TABLET ORAL
Status: COMPLETED | OUTPATIENT
Start: 2018-10-28 | End: 2018-10-28

## 2018-10-28 RX ADMIN — ACETAMINOPHEN 1000 MG: 500 TABLET, FILM COATED ORAL at 10:10
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
## 2018-11-13 ENCOUNTER — OFFICE VISIT (OUTPATIENT)
Dept: FAMILY MEDICINE | Facility: CLINIC | Age: 33
End: 2018-11-13
Payer: COMMERCIAL

## 2018-11-13 VITALS
OXYGEN SATURATION: 98 % | BODY MASS INDEX: 30.16 KG/M2 | HEART RATE: 61 BPM | WEIGHT: 187.63 LBS | TEMPERATURE: 98 F | HEIGHT: 66 IN | SYSTOLIC BLOOD PRESSURE: 122 MMHG | DIASTOLIC BLOOD PRESSURE: 78 MMHG

## 2018-11-13 DIAGNOSIS — M54.2 NECK PAIN: ICD-10-CM

## 2018-11-13 DIAGNOSIS — J98.01 BRONCHOSPASM: ICD-10-CM
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
## 2018-11-13 PROCEDURE — 99214 OFFICE O/P EST MOD 30 MIN: CPT | Mod: 25,S$GLB,, | Performed by: FAMILY MEDICINE

## 2018-11-13 PROCEDURE — 99999 PR PBB SHADOW E&M-EST. PATIENT-LVL III: CPT | Mod: PBBFAC,,, | Performed by: FAMILY MEDICINE

## 2018-11-13 PROCEDURE — 3008F BODY MASS INDEX DOCD: CPT | Mod: CPTII,S$GLB,, | Performed by: FAMILY MEDICINE

## 2018-11-13 PROCEDURE — 96372 THER/PROPH/DIAG INJ SC/IM: CPT | Mod: S$GLB,,, | Performed by: FAMILY MEDICINE

## 2018-11-13 RX ORDER — NAPROXEN 500 MG/1
500 TABLET ORAL 2 TIMES DAILY PRN
Qty: 30 TABLET | Refills: 0 | Status: SHIPPED | OUTPATIENT
Start: 2018-11-13 | End: 2019-06-10

## 2018-11-13 RX ORDER — IPRATROPIUM BROMIDE AND ALBUTEROL SULFATE 2.5; .5 MG/3ML; MG/3ML
3 SOLUTION RESPIRATORY (INHALATION) EVERY 6 HOURS PRN
Qty: 100 VIAL | Refills: 1 | Status: SHIPPED | OUTPATIENT
Start: 2018-11-13 | End: 2020-03-13 | Stop reason: SDUPTHER

## 2018-11-13 RX ORDER — ACETAMINOPHEN AND CODEINE PHOSPHATE 300; 30 MG/1; MG/1
TABLET ORAL
COMMUNITY
Start: 2018-11-02 | End: 2020-09-21 | Stop reason: CLARIF

## 2018-11-13 RX ORDER — KETOROLAC TROMETHAMINE 30 MG/ML
30 INJECTION, SOLUTION INTRAMUSCULAR; INTRAVENOUS ONCE
Status: COMPLETED | OUTPATIENT
Start: 2018-11-13 | End: 2018-11-13

## 2018-11-13 RX ORDER — CYCLOBENZAPRINE HCL 10 MG
10 TABLET ORAL 3 TIMES DAILY PRN
Qty: 30 TABLET | Refills: 0 | Status: SHIPPED | OUTPATIENT
Start: 2018-11-13 | End: 2020-03-13 | Stop reason: SDUPTHER

## 2018-11-13 RX ORDER — AMOXICILLIN 500 MG/1
CAPSULE ORAL
COMMUNITY
Start: 2018-11-01 | End: 2019-07-15

## 2018-11-13 RX ADMIN — KETOROLAC TROMETHAMINE 30 MG: 30 INJECTION, SOLUTION INTRAMUSCULAR; INTRAVENOUS at 11:11
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
## 2018-11-13 NOTE — PROGRESS NOTES
Patient tolerate Toradol 30 mg IM injection . Patient advise to wait 15 min after injection  for assessment of any posssible side effects.

## 2019-01-29 ENCOUNTER — HOSPITAL ENCOUNTER (EMERGENCY)
Facility: HOSPITAL | Age: 34
Discharge: HOME | End: 2019-01-29
Attending: EMERGENCY MEDICINE
Payer: COMMERCIAL

## 2019-01-29 ENCOUNTER — NURSE TRIAGE (OUTPATIENT)
Dept: ADMINISTRATIVE | Facility: CLINIC | Age: 34
End: 2019-01-29

## 2019-01-29 VITALS
RESPIRATION RATE: 16 BRPM | BODY MASS INDEX: 27.44 KG/M2 | HEART RATE: 73 BPM | SYSTOLIC BLOOD PRESSURE: 133 MMHG | OXYGEN SATURATION: 97 % | DIASTOLIC BLOOD PRESSURE: 79 MMHG | WEIGHT: 170 LBS | TEMPERATURE: 98 F

## 2019-01-29 DIAGNOSIS — J45.21 MILD INTERMITTENT ASTHMA WITH EXACERBATION: Primary | ICD-10-CM

## 2019-01-29 PROCEDURE — 63600175 PHARM REV CODE 636 W HCPCS: Mod: ER | Performed by: NURSE PRACTITIONER

## 2019-01-29 PROCEDURE — 94640 AIRWAY INHALATION TREATMENT: CPT | Mod: ER

## 2019-01-29 PROCEDURE — 99284 EMERGENCY DEPT VISIT MOD MDM: CPT | Mod: 25,ER

## 2019-01-29 PROCEDURE — 25000242 PHARM REV CODE 250 ALT 637 W/ HCPCS: Mod: ER | Performed by: NURSE PRACTITIONER

## 2019-01-29 PROCEDURE — 96372 THER/PROPH/DIAG INJ SC/IM: CPT | Mod: ER

## 2019-01-29 RX ORDER — IPRATROPIUM BROMIDE AND ALBUTEROL SULFATE 2.5; .5 MG/3ML; MG/3ML
3 SOLUTION RESPIRATORY (INHALATION) ONCE
Status: COMPLETED | OUTPATIENT
Start: 2019-01-29 | End: 2019-01-29

## 2019-01-29 RX ORDER — DEXAMETHASONE SODIUM PHOSPHATE 4 MG/ML
12 INJECTION, SOLUTION INTRA-ARTICULAR; INTRALESIONAL; INTRAMUSCULAR; INTRAVENOUS; SOFT TISSUE ONCE
Status: COMPLETED | OUTPATIENT
Start: 2019-01-29 | End: 2019-01-29

## 2019-01-29 RX ADMIN — IPRATROPIUM BROMIDE AND ALBUTEROL SULFATE 3 ML: .5; 3 SOLUTION RESPIRATORY (INHALATION) at 10:01

## 2019-01-29 RX ADMIN — DEXAMETHASONE SODIUM PHOSPHATE 12 MG: 4 INJECTION, SOLUTION INTRA-ARTICULAR; INTRALESIONAL; INTRAMUSCULAR; INTRAVENOUS; SOFT TISSUE at 10:01

## 2019-01-29 NOTE — ED PROVIDER NOTES
"Encounter Date: 1/29/2019       History     Chief Complaint   Patient presents with    Cough     Pt states," Cough for three days. I used my inhailer and my chest still feels tight."     HPI  Review of patient's allergies indicates:  No Known Allergies  Past Medical History:   Diagnosis Date    Asthma     Headache     Hernia      Past Surgical History:   Procedure Laterality Date    HERNIA REPAIR       Family History   Problem Relation Age of Onset    Diabetes Mother     Hypertension Mother     Carpal tunnel syndrome Father     Liver disease Father     No Known Problems Sister     No Known Problems Brother     Allergies Daughter     Asthma Daughter     No Known Problems Son     No Known Problems Sister     No Known Problems Sister     No Known Problems Brother     No Known Problems Brother      Social History     Tobacco Use    Smoking status: Never Smoker    Smokeless tobacco: Never Used   Substance Use Topics    Alcohol use: No    Drug use: No     Review of Systems    Physical Exam     Initial Vitals [01/29/19 0942]   BP Pulse Resp Temp SpO2   (!) 142/81 69 16 98 °F (36.7 °C) 97 %      MAP       --         Physical Exam    ED Course   Procedures  Labs Reviewed - No data to display       Imaging Results    None                               Clinical Impression:   {Add your Clinical Impression here. If you haven't documented one yet, please pend the note, finalize a Clinical Impression, and refresh your note before signing.:[id number]}                          "

## 2019-01-29 NOTE — TELEPHONE ENCOUNTER
Reason for Disposition   Chest pain    Protocols used: ST ASTHMA ATTACK-A-AH    Pt states his asthma is acting up. Chest pain started last night and it still present slightly this AM. Ed advised. Pt would like to speak to PCP about scheduling appt, please call to advise.

## 2019-01-29 NOTE — ED PROVIDER NOTES
"Encounter Date: 1/29/2019       History     Chief Complaint   Patient presents with    Cough     Pt states," Cough for three days. I used my inhailer and my chest still feels tight."     A 33-year-old male who presents to the ED with complaints of a nonproductive cough for 3 days.  Patient has a history of asthma.  Patient states his chest felt tight last night albuterol inhaler with no improvement.  Patient has no active wheezing at this time or chest tightness at this time.  Patient states this is the way it feels when his asthma flares up.  Patient denies fever, chills, or SOB.       The history is provided by the patient.   Cough   This is a new problem. The current episode started several days ago. The problem has been gradually worsening. The cough is non-productive. There has been no fever. Pertinent negatives include no chest pain and no shortness of breath. He has tried nothing for the symptoms. His past medical history is significant for asthma.     Review of patient's allergies indicates:  No Known Allergies  Past Medical History:   Diagnosis Date    Asthma     Headache     Hernia      Past Surgical History:   Procedure Laterality Date    HERNIA REPAIR       Family History   Problem Relation Age of Onset    Diabetes Mother     Hypertension Mother     Carpal tunnel syndrome Father     Liver disease Father     No Known Problems Sister     No Known Problems Brother     Allergies Daughter     Asthma Daughter     No Known Problems Son     No Known Problems Sister     No Known Problems Sister     No Known Problems Brother     No Known Problems Brother      Social History     Tobacco Use    Smoking status: Never Smoker    Smokeless tobacco: Never Used   Substance Use Topics    Alcohol use: No    Drug use: No     Review of Systems   Constitutional: Negative.  Negative for activity change.   HENT: Negative.  Negative for congestion.    Eyes: Negative.  Negative for discharge.   Respiratory: " Positive for cough. Negative for shortness of breath.    Cardiovascular: Negative.  Negative for chest pain.   Gastrointestinal: Negative.  Negative for abdominal pain, nausea and vomiting.   Genitourinary: Negative.  Negative for dysuria.   Musculoskeletal: Negative.    Skin: Negative.    Neurological: Negative.  Negative for weakness.   Hematological: Does not bruise/bleed easily.   Psychiatric/Behavioral: Negative.    All other systems reviewed and are negative.      Physical Exam     Initial Vitals [01/29/19 0942]   BP Pulse Resp Temp SpO2   (!) 142/81 69 16 98 °F (36.7 °C) 97 %      MAP       --         Physical Exam    Nursing note and vitals reviewed.  Constitutional: Vital signs are normal. He appears well-developed and well-nourished.   HENT:   Head: Normocephalic and atraumatic.   Right Ear: External ear normal.   Left Ear: External ear normal.   Nose: Nose normal.   Mouth/Throat: Oropharynx is clear and moist.   Eyes: Conjunctivae and lids are normal. Pupils are equal, round, and reactive to light.   Neck: Normal range of motion. Neck supple.   Cardiovascular: Normal rate, regular rhythm, S1 normal, S2 normal and normal heart sounds.   Pulmonary/Chest: Effort normal and breath sounds normal.   Abdominal: Soft. Normal appearance and bowel sounds are normal. There is no tenderness.   Musculoskeletal: Normal range of motion.   FROM of all extremities   Neurological: He is alert and oriented to person, place, and time. He has normal strength.   Skin: Skin is warm, dry and intact.   Psychiatric: He has a normal mood and affect. His speech is normal and behavior is normal. Judgment normal.         ED Course   Procedures  Labs Reviewed - No data to display       Imaging Results    None          Medical Decision Making:   Initial Assessment:   A 33-year-old male who presents to the ED with complaints of a nonproductive cough for 3 days.  Patient has a history of asthma.  Patient states his chest felt tight last  night albuterol inhaler with no improvement.  Patient has no active wheezing at this time or chest tightness at this time.  Patient states this is the way it feels when his asthma flares up.  Patient denies fever or chills. Physical exam lungs are clear.  Patient has no active wheezing.    Differential Diagnosis:   Asthma exacerbation  ED Management:  Decadron 12 mg IM.  DuoNeb neb treatment.  Patient states he felt better.  Patient to continue albuterol inhaler as ordered.  Follow-up with PCP in 1-2 days.  Return ED for worsening of symptoms.                      Clinical Impression:   The encounter diagnosis was Mild intermittent asthma with exacerbation.                             Paola Dc, ASHISH  01/29/19 5342

## 2019-04-14 ENCOUNTER — HOSPITAL ENCOUNTER (EMERGENCY)
Facility: HOSPITAL | Age: 34
Discharge: HOME | End: 2019-04-14
Attending: EMERGENCY MEDICINE
Payer: COMMERCIAL

## 2019-04-14 VITALS
OXYGEN SATURATION: 99 % | RESPIRATION RATE: 18 BRPM | DIASTOLIC BLOOD PRESSURE: 86 MMHG | TEMPERATURE: 97 F | BODY MASS INDEX: 27.32 KG/M2 | HEIGHT: 66 IN | WEIGHT: 170 LBS | SYSTOLIC BLOOD PRESSURE: 140 MMHG | HEART RATE: 78 BPM

## 2019-04-14 DIAGNOSIS — K05.10 GINGIVITIS: ICD-10-CM

## 2019-04-14 DIAGNOSIS — K08.89 TOOTHACHE: Primary | ICD-10-CM

## 2019-04-14 PROCEDURE — 99283 EMERGENCY DEPT VISIT LOW MDM: CPT | Mod: ER

## 2019-04-14 PROCEDURE — 25000003 PHARM REV CODE 250: Mod: ER | Performed by: NURSE PRACTITIONER

## 2019-04-14 RX ORDER — HYDROCODONE BITARTRATE AND ACETAMINOPHEN 5; 325 MG/1; MG/1
1 TABLET ORAL EVERY 4 HOURS PRN
Qty: 18 TABLET | Refills: 0 | Status: SHIPPED | OUTPATIENT
Start: 2019-04-14 | End: 2020-09-21 | Stop reason: CLARIF

## 2019-04-14 RX ORDER — AMOXICILLIN 875 MG/1
875 TABLET, FILM COATED ORAL 2 TIMES DAILY
Qty: 14 TABLET | Refills: 0 | Status: SHIPPED | OUTPATIENT
Start: 2019-04-14 | End: 2019-07-15

## 2019-04-14 RX ORDER — IBUPROFEN 800 MG/1
800 TABLET ORAL EVERY 6 HOURS PRN
Qty: 20 TABLET | Refills: 0 | OUTPATIENT
Start: 2019-04-14 | End: 2019-06-10

## 2019-04-14 RX ORDER — AMOXICILLIN 250 MG/1
500 CAPSULE ORAL
Status: COMPLETED | OUTPATIENT
Start: 2019-04-14 | End: 2019-04-14

## 2019-04-14 RX ORDER — OXYCODONE AND ACETAMINOPHEN 5; 325 MG/1; MG/1
1 TABLET ORAL
Status: COMPLETED | OUTPATIENT
Start: 2019-04-14 | End: 2019-04-14

## 2019-04-14 RX ORDER — IBUPROFEN 400 MG/1
800 TABLET ORAL
Status: COMPLETED | OUTPATIENT
Start: 2019-04-14 | End: 2019-04-14

## 2019-04-14 RX ADMIN — OXYCODONE AND ACETAMINOPHEN 1 TABLET: 5; 325 TABLET ORAL at 08:04

## 2019-04-14 RX ADMIN — AMOXICILLIN 500 MG: 250 CAPSULE ORAL at 08:04

## 2019-04-14 RX ADMIN — IBUPROFEN 800 MG: 400 TABLET, FILM COATED ORAL at 08:04

## 2019-04-15 NOTE — ED PROVIDER NOTES
Encounter Date: 4/14/2019       History     Chief Complaint   Patient presents with    Dental Pain     face and dental pain x 1 day. Swelling noted to inner right cheek area. Reports pain 10/10 and throbbing     Patient presents with pain to right upper molar with swelling noted to right side of face x1 day.  Patient states he thinks his feelings falling out and has a dental abscess as a result.  Patient ambulated in the ER.  Patient states he will try to make a dental appointment this week.  States pain started yesterday and he took Tylenol but it got much worse today.        Review of patient's allergies indicates:  No Known Allergies  Past Medical History:   Diagnosis Date    Asthma     Headache     Hernia      Past Surgical History:   Procedure Laterality Date    HERNIA REPAIR       Family History   Problem Relation Age of Onset    Diabetes Mother     Hypertension Mother     Carpal tunnel syndrome Father     Liver disease Father     No Known Problems Sister     No Known Problems Brother     Allergies Daughter     Asthma Daughter     No Known Problems Son     No Known Problems Sister     No Known Problems Sister     No Known Problems Brother     No Known Problems Brother      Social History     Tobacco Use    Smoking status: Never Smoker    Smokeless tobacco: Never Used   Substance Use Topics    Alcohol use: No    Drug use: No     Review of Systems   Constitutional: Negative for fever.   HENT: Positive for dental problem and facial swelling. Negative for sore throat.    Respiratory: Negative for shortness of breath.    Cardiovascular: Negative for chest pain.   Gastrointestinal: Negative for nausea.   Genitourinary: Negative for dysuria.   Musculoskeletal: Negative for back pain.   Skin: Negative for rash.   Neurological: Negative for weakness.   Hematological: Does not bruise/bleed easily.   All other systems reviewed and are negative.      Physical Exam     Initial Vitals [04/14/19 2024]    BP Pulse Resp Temp SpO2   (!) 140/86 75 20 97.3 °F (36.3 °C) 97 %      MAP       --         Physical Exam    Nursing note and vitals reviewed.  Constitutional: He appears well-developed and well-nourished. He is not diaphoretic.   HENT:   Head: Normocephalic and atraumatic.   Right Ear: External ear normal.   Left Ear: External ear normal.   Nose: Nose normal.   Mouth/Throat: Oropharynx is clear and moist. Abnormal dentition. Dental abscesses and dental caries present. No oropharyngeal exudate.       Eyes: Conjunctivae and EOM are normal. Pupils are equal, round, and reactive to light. Right eye exhibits no discharge. Left eye exhibits no discharge. No scleral icterus.   Neck: Normal range of motion. Neck supple. No tracheal deviation present. No JVD present.   Cardiovascular: Normal rate, regular rhythm, normal heart sounds and intact distal pulses. Exam reveals no gallop and no friction rub.    No murmur heard.  Pulmonary/Chest: Breath sounds normal. No stridor. No respiratory distress. He has no wheezes. He has no rhonchi. He exhibits no tenderness.   Abdominal: Soft. Bowel sounds are normal. He exhibits no distension and no mass. There is no tenderness. There is no guarding.   Musculoskeletal: Normal range of motion. He exhibits no edema.   Lymphadenopathy:     He has no cervical adenopathy.   Neurological: He is alert and oriented to person, place, and time. No cranial nerve deficit.   Skin: Skin is warm and dry. No rash noted. No erythema.   Psychiatric: He has a normal mood and affect. His behavior is normal. Judgment and thought content normal.         ED Course   Procedures  Labs Reviewed - No data to display       Imaging Results    None          Medical Decision Making:   Differential Diagnosis:   Dental pain, abscess, gingivitis                      Clinical Impression:       ICD-10-CM ICD-9-CM   1. Toothache K08.89 525.9   2. Gingivitis K05.10 523.10                                Heather Chacon  [facility]  04/14/19 2050

## 2019-06-10 ENCOUNTER — HOSPITAL ENCOUNTER (EMERGENCY)
Facility: HOSPITAL | Age: 34
Discharge: HOME | End: 2019-06-10
Attending: EMERGENCY MEDICINE
Payer: COMMERCIAL

## 2019-06-10 VITALS
TEMPERATURE: 98 F | DIASTOLIC BLOOD PRESSURE: 54 MMHG | BODY MASS INDEX: 29.02 KG/M2 | RESPIRATION RATE: 18 BRPM | HEART RATE: 82 BPM | WEIGHT: 170 LBS | HEIGHT: 64 IN | SYSTOLIC BLOOD PRESSURE: 116 MMHG | OXYGEN SATURATION: 98 %

## 2019-06-10 DIAGNOSIS — M76.811 ANTERIOR TIBIAL TENDONITIS, RIGHT: Primary | ICD-10-CM

## 2019-06-10 PROCEDURE — 96372 THER/PROPH/DIAG INJ SC/IM: CPT | Mod: ER

## 2019-06-10 PROCEDURE — 99284 EMERGENCY DEPT VISIT MOD MDM: CPT | Mod: 25,ER

## 2019-06-10 PROCEDURE — 63600175 PHARM REV CODE 636 W HCPCS: Mod: ER | Performed by: NURSE PRACTITIONER

## 2019-06-10 RX ORDER — DICLOFENAC SODIUM 50 MG/1
50 TABLET, DELAYED RELEASE ORAL EVERY 8 HOURS PRN
Qty: 20 TABLET | Refills: 0 | Status: SHIPPED | OUTPATIENT
Start: 2019-06-10 | End: 2019-06-12 | Stop reason: SDUPTHER

## 2019-06-10 RX ORDER — KETOROLAC TROMETHAMINE 30 MG/ML
30 INJECTION, SOLUTION INTRAMUSCULAR; INTRAVENOUS
Status: COMPLETED | OUTPATIENT
Start: 2019-06-10 | End: 2019-06-10

## 2019-06-10 RX ADMIN — KETOROLAC TROMETHAMINE 30 MG: 30 INJECTION, SOLUTION INTRAMUSCULAR at 12:06

## 2019-06-10 NOTE — ED PROVIDER NOTES
Encounter Date: 6/10/2019       History     Chief Complaint   Patient presents with    Leg Pain     right shin pain that started while working out shortly PTA     The history is provided by the patient. No  was used.   Leg Pain    The incident occurred at the gym. There was no injury mechanism. Illness onset: Patient presents ambulatory complaining of pain to his right shoe and that began while he was riding a stationary bicycle at the gym earlier today.  Denies calf pain.  Denies injury. The pain is present in the right leg. The quality of the pain is described as burning. The pain is at a severity of 5/10. The pain has been constant since onset. Pertinent negatives include no numbness, no inability to bear weight, no loss of motion, no muscle weakness, no loss of sensation and no tingling. He reports no foreign bodies present. Exacerbated by: Walking and pressing his foot on the gas while driving. He has tried nothing for the symptoms.     Review of patient's allergies indicates:  No Known Allergies  Past Medical History:   Diagnosis Date    Asthma     Headache     Hernia      Past Surgical History:   Procedure Laterality Date    HERNIA REPAIR       Family History   Problem Relation Age of Onset    Diabetes Mother     Hypertension Mother     Carpal tunnel syndrome Father     Liver disease Father     No Known Problems Sister     No Known Problems Brother     Allergies Daughter     Asthma Daughter     No Known Problems Son     No Known Problems Sister     No Known Problems Sister     No Known Problems Brother     No Known Problems Brother      Social History     Tobacco Use    Smoking status: Never Smoker    Smokeless tobacco: Never Used   Substance Use Topics    Alcohol use: No    Drug use: No     Review of Systems   Constitutional: Negative.  Negative for activity change, chills, diaphoresis and fever.   HENT: Negative.  Negative for congestion, nosebleeds, rhinorrhea,  sinus pressure, sinus pain, sore throat and trouble swallowing.    Eyes: Negative.  Negative for pain, discharge, redness and itching.   Respiratory: Negative.  Negative for cough, chest tightness, shortness of breath, wheezing and stridor.    Cardiovascular: Negative.  Negative for chest pain, palpitations and leg swelling.   Gastrointestinal: Negative.  Negative for abdominal pain, constipation, diarrhea, nausea and vomiting.   Endocrine: Negative.  Negative for cold intolerance, heat intolerance, polydipsia, polyphagia and polyuria.   Genitourinary: Negative.  Negative for difficulty urinating, discharge, dysuria, frequency, genital sores, penile pain, scrotal swelling and testicular pain.   Musculoskeletal: Negative for back pain, gait problem, joint swelling and neck pain.        Right leg pain   Skin: Negative.  Negative for color change, rash and wound.   Allergic/Immunologic: Negative.    Neurological: Negative.  Negative for dizziness, tingling, tremors, seizures, weakness, light-headedness, numbness and headaches.   Hematological: Negative.    Psychiatric/Behavioral: Negative.  Negative for agitation, behavioral problems, confusion, hallucinations, self-injury and suicidal ideas. The patient is not nervous/anxious and is not hyperactive.    All other systems reviewed and are negative.      Physical Exam     Initial Vitals [06/10/19 1242]   BP Pulse Resp Temp SpO2   (!) 116/54 82 18 98 °F (36.7 °C) 98 %      MAP       --         Physical Exam    Nursing note and vitals reviewed.  Constitutional: He appears well-developed and well-nourished. He is not diaphoretic.  Non-toxic appearance. He does not appear ill. No distress.   HENT:   Head: Normocephalic and atraumatic.   Eyes: Conjunctivae are normal.   Neck: Normal range of motion.   Cardiovascular: Normal rate, regular rhythm, normal heart sounds and intact distal pulses. Exam reveals no gallop and no friction rub.    No murmur heard.  Pulmonary/Chest:  Breath sounds normal. No respiratory distress. He has no wheezes. He has no rhonchi. He has no rales. He exhibits no tenderness.   Musculoskeletal: Normal range of motion.        Right ankle: Achilles tendon normal. Achilles tendon exhibits no pain, no defect and normal Girard's test results.   5/5 motor strength BLE with intact sensation  Negative SLR BLE  Normal heel/toe BLE  2+ reflexes BLE  No bony tenderness  No s/s cauda equina   Neurological: He is alert and oriented to person, place, and time. He has normal strength and normal reflexes. No sensory deficit. GCS score is 15. GCS eye subscore is 4. GCS verbal subscore is 5. GCS motor subscore is 6.   Skin: Skin is warm and dry. Capillary refill takes less than 2 seconds. No rash noted.   Psychiatric: He has a normal mood and affect. His behavior is normal. Judgment and thought content normal.         ED Course   Procedures  Labs Reviewed - No data to display       Imaging Results    None          Medical Decision Making:   Initial Assessment:   Anterior tibial tendinitis  Differential Diagnosis:   Radiculopathy, bony tenderness  ED Management:  No evidence of radiculopathy of bony tenderness noted on exam.  Therefore, no imaging is warranted at this time.  The patient will be given Toradol IM injection the ER discharged home on diclofenac with instructions to use over-the-counter icy Hot as needed, refrain from strenuous activity, follow up with his primary care provider tomorrow and return to the ER as needed if symptoms worsen or fail to improve.  The patient verbalized understanding of discharge instruction treatment plan.                      Clinical Impression:       ICD-10-CM ICD-9-CM   1. Anterior tibial tendonitis, right M76.811 726.72                                Toussaint Battley III, [facility]  06/10/19 1300

## 2019-06-12 ENCOUNTER — LAB VISIT (OUTPATIENT)
Dept: LAB | Facility: HOSPITAL | Age: 34
End: 2019-06-12
Attending: FAMILY MEDICINE
Payer: COMMERCIAL

## 2019-06-12 ENCOUNTER — OFFICE VISIT (OUTPATIENT)
Dept: FAMILY MEDICINE | Facility: CLINIC | Age: 34
End: 2019-06-12
Payer: COMMERCIAL

## 2019-06-12 VITALS
WEIGHT: 185.75 LBS | HEIGHT: 64 IN | BODY MASS INDEX: 31.71 KG/M2 | HEART RATE: 62 BPM | DIASTOLIC BLOOD PRESSURE: 78 MMHG | TEMPERATURE: 98 F | SYSTOLIC BLOOD PRESSURE: 130 MMHG | OXYGEN SATURATION: 97 %

## 2019-06-12 DIAGNOSIS — M79.604 RIGHT LEG PAIN: ICD-10-CM

## 2019-06-12 DIAGNOSIS — M79.604 RIGHT LEG PAIN: Primary | ICD-10-CM

## 2019-06-12 PROBLEM — M54.6 THORACIC BACK PAIN: Status: RESOLVED | Noted: 2018-10-28 | Resolved: 2019-06-12

## 2019-06-12 PROBLEM — Z04.1 EXAM FOLLOWING MVC (MOTOR VEHICLE COLLISION), NO APPARENT INJURY: Status: RESOLVED | Noted: 2018-10-28 | Resolved: 2019-06-12

## 2019-06-12 PROBLEM — M54.50 LUMBAR BACK PAIN: Status: RESOLVED | Noted: 2018-10-28 | Resolved: 2019-06-12

## 2019-06-12 PROCEDURE — 96372 THER/PROPH/DIAG INJ SC/IM: CPT | Mod: S$GLB,,, | Performed by: FAMILY MEDICINE

## 2019-06-12 PROCEDURE — 99214 PR OFFICE/OUTPT VISIT, EST, LEVL IV, 30-39 MIN: ICD-10-PCS | Mod: 25,S$GLB,, | Performed by: FAMILY MEDICINE

## 2019-06-12 PROCEDURE — 96372 PR INJECTION,THERAP/PROPH/DIAG2ST, IM OR SUBCUT: ICD-10-PCS | Mod: S$GLB,,, | Performed by: FAMILY MEDICINE

## 2019-06-12 PROCEDURE — 3008F BODY MASS INDEX DOCD: CPT | Mod: CPTII,S$GLB,, | Performed by: FAMILY MEDICINE

## 2019-06-12 PROCEDURE — 99999 PR PBB SHADOW E&M-EST. PATIENT-LVL III: ICD-10-PCS | Mod: PBBFAC,,, | Performed by: FAMILY MEDICINE

## 2019-06-12 PROCEDURE — 36415 COLL VENOUS BLD VENIPUNCTURE: CPT | Mod: PO

## 2019-06-12 PROCEDURE — 99214 OFFICE O/P EST MOD 30 MIN: CPT | Mod: 25,S$GLB,, | Performed by: FAMILY MEDICINE

## 2019-06-12 PROCEDURE — 3008F PR BODY MASS INDEX (BMI) DOCUMENTED: ICD-10-PCS | Mod: CPTII,S$GLB,, | Performed by: FAMILY MEDICINE

## 2019-06-12 PROCEDURE — 82550 ASSAY OF CK (CPK): CPT

## 2019-06-12 PROCEDURE — 99999 PR PBB SHADOW E&M-EST. PATIENT-LVL III: CPT | Mod: PBBFAC,,, | Performed by: FAMILY MEDICINE

## 2019-06-12 PROCEDURE — 80053 COMPREHEN METABOLIC PANEL: CPT

## 2019-06-12 RX ORDER — DICLOFENAC SODIUM 50 MG/1
50 TABLET, DELAYED RELEASE ORAL EVERY 8 HOURS PRN
Qty: 30 TABLET | Refills: 0 | Status: SHIPPED | OUTPATIENT
Start: 2019-06-12 | End: 2019-11-12

## 2019-06-12 RX ORDER — KETOROLAC TROMETHAMINE 30 MG/ML
30 INJECTION, SOLUTION INTRAMUSCULAR; INTRAVENOUS ONCE
Status: COMPLETED | OUTPATIENT
Start: 2019-06-12 | End: 2019-06-12

## 2019-06-12 RX ADMIN — KETOROLAC TROMETHAMINE 30 MG: 30 INJECTION, SOLUTION INTRAMUSCULAR; INTRAVENOUS at 02:06

## 2019-06-12 NOTE — PROGRESS NOTES
[facility]  Progress Note    SUBJECTIVE:     Chief Complaint   Patient presents with    Leg Pain     Right leg pain states it started 12 days ago        HPI   Solomon Busby Jr.  is a 33 y.o. male here for c/o right leg pain. Started about 2 weeks ago when he was running  In a marathon. Didn't fall. Onset was sudden. Rates pain as moderate. Hasn't tried anything for it. No radiation.     Review of patient's allergies indicates:  No Known Allergies    Past Medical History:   Diagnosis Date    Asthma     Headache     Hernia      Past Surgical History:   Procedure Laterality Date    HERNIA REPAIR       Family History   Problem Relation Age of Onset    Diabetes Mother     Hypertension Mother     Carpal tunnel syndrome Father     Liver disease Father     No Known Problems Sister     No Known Problems Brother     Allergies Daughter     Asthma Daughter     No Known Problems Son     No Known Problems Sister     No Known Problems Sister     No Known Problems Brother     No Known Problems Brother      Social History     Tobacco Use    Smoking status: Never Smoker    Smokeless tobacco: Never Used   Substance Use Topics    Alcohol use: No    Drug use: No        Review of Systems   Constitutional: Negative for chills, fever and malaise/fatigue.   HENT: Negative.    Respiratory: Negative.  Negative for cough and shortness of breath.    Cardiovascular: Negative.  Negative for chest pain.   Gastrointestinal: Negative.  Negative for abdominal pain, nausea and vomiting.   Genitourinary: Negative.    Musculoskeletal: Positive for joint pain (R leg pain). Negative for falls.   Neurological: Negative for weakness and headaches.   All other systems reviewed and are negative.    OBJECTIVE:     Vitals:    06/12/19 1354   BP: 130/78   Pulse: 62   Temp: 98.2 °F (36.8 °C)     Body mass index is 31.88 kg/m².    Physical Exam   Constitutional: He is oriented to person, place, and time and well-developed,  well-nourished, and in no distress. No distress.   HENT:   Head: Normocephalic and atraumatic.   Nose: Nose normal.   Eyes: Conjunctivae and EOM are normal.   Cardiovascular: Normal rate, regular rhythm and normal heart sounds. Exam reveals no gallop and no friction rub.   No murmur heard.  Pulmonary/Chest: Effort normal and breath sounds normal. No respiratory distress. He has no wheezes. He has no rales. He exhibits no tenderness.   Abdominal: Soft. Bowel sounds are normal. He exhibits no distension. There is no tenderness. There is no rebound.   Musculoskeletal: He exhibits tenderness (to palpation of right anterior tibial area. no fractures, dislocations.).   Neurological: He is alert and oriented to person, place, and time.   Skin: Skin is warm. He is not diaphoretic.       Old records were reviewed. Labs and/or images were independently reviewed.    ASSESSMENT     1. Right leg pain        PLAN:     Right leg pain (likely musculoskeletal)  -     ketorolac injection 30 mg  -     Comprehensive metabolic panel; Future  -     CK; Future; Expected date: 06/12/2019  -     diclofenac (VOLTAREN) 50 MG EC tablet; Take 1 tablet (50 mg total) by mouth every 8 (eight) hours as needed.  Dispense: 30 tablet; Refill: 0  -     Patient counseled on good posture and stretching exercises. Continue to place ice packs on affected areas 3-4 times daily. Take medications as prescribed. Instructed patient to call MD if symptoms persist or worsen.    RTC PRJUAN M Shaw MD  06/12/2019 4:01 PM

## 2019-06-12 NOTE — LETTER
June 12, 2019      [facility]  [address]  [place] [address]  Phone: [phone]  Fax: [phone]       Patient: Solomon Busby   YOB: 1985  Date of Visit: 06/12/2019    To Whom It May Concern:    Charles Busby  was at [facility] on 06/12/2019. He may return to work/school on 6/14/19 with no restrictions. If you have any questions or concerns, or if I can be of further assistance, please do not hesitate to contact me.    Sincerely,        Kaushik Shaw MD

## 2019-06-13 LAB
ALBUMIN SERPL BCP-MCNC: 4.2 G/DL (ref 3.5–5.2)
ALP SERPL-CCNC: 55 U/L (ref 55–135)
ALT SERPL W/O P-5'-P-CCNC: 39 U/L (ref 10–44)
ANION GAP SERPL CALC-SCNC: 10 MMOL/L (ref 8–16)
AST SERPL-CCNC: 27 U/L (ref 10–40)
BILIRUB SERPL-MCNC: 0.3 MG/DL (ref 0.1–1)
BUN SERPL-MCNC: 10 MG/DL (ref 6–20)
CALCIUM SERPL-MCNC: 9.9 MG/DL (ref 8.7–10.5)
CHLORIDE SERPL-SCNC: 104 MMOL/L (ref 95–110)
CK SERPL-CCNC: 292 U/L (ref 20–200)
CO2 SERPL-SCNC: 25 MMOL/L (ref 23–29)
CREAT SERPL-MCNC: 1 MG/DL (ref 0.5–1.4)
EST. GFR  (AFRICAN AMERICAN): >60 ML/MIN/1.73 M^2
EST. GFR  (NON AFRICAN AMERICAN): >60 ML/MIN/1.73 M^2
GLUCOSE SERPL-MCNC: 92 MG/DL (ref 70–110)
POTASSIUM SERPL-SCNC: 3.9 MMOL/L (ref 3.5–5.1)
PROT SERPL-MCNC: 7.7 G/DL (ref 6–8.4)
SODIUM SERPL-SCNC: 139 MMOL/L (ref 136–145)

## 2019-07-15 ENCOUNTER — OFFICE VISIT (OUTPATIENT)
Dept: FAMILY MEDICINE | Facility: CLINIC | Age: 34
End: 2019-07-15
Payer: COMMERCIAL

## 2019-07-15 VITALS
BODY MASS INDEX: 31.58 KG/M2 | HEART RATE: 73 BPM | SYSTOLIC BLOOD PRESSURE: 120 MMHG | HEIGHT: 64 IN | TEMPERATURE: 98 F | DIASTOLIC BLOOD PRESSURE: 80 MMHG | WEIGHT: 185 LBS | OXYGEN SATURATION: 97 %

## 2019-07-15 DIAGNOSIS — J02.9 PHARYNGOTONSILLITIS: Primary | ICD-10-CM

## 2019-07-15 DIAGNOSIS — J03.90 PHARYNGOTONSILLITIS: Primary | ICD-10-CM

## 2019-07-15 DIAGNOSIS — J02.9 SORE THROAT: ICD-10-CM

## 2019-07-15 LAB
CTP QC/QA: YES
MOLECULAR STREP A: NEGATIVE

## 2019-07-15 PROCEDURE — 99214 OFFICE O/P EST MOD 30 MIN: CPT | Mod: 25,S$GLB,, | Performed by: NURSE PRACTITIONER

## 2019-07-15 PROCEDURE — 87651 STREP A DNA AMP PROBE: CPT | Mod: QW,S$GLB,, | Performed by: NURSE PRACTITIONER

## 2019-07-15 PROCEDURE — 99999 PR PBB SHADOW E&M-EST. PATIENT-LVL IV: ICD-10-PCS | Mod: PBBFAC,,, | Performed by: NURSE PRACTITIONER

## 2019-07-15 PROCEDURE — 3008F BODY MASS INDEX DOCD: CPT | Mod: CPTII,S$GLB,, | Performed by: NURSE PRACTITIONER

## 2019-07-15 PROCEDURE — 99999 PR PBB SHADOW E&M-EST. PATIENT-LVL IV: CPT | Mod: PBBFAC,,, | Performed by: NURSE PRACTITIONER

## 2019-07-15 PROCEDURE — 87651 POCT STREP A MOLECULAR: ICD-10-PCS | Mod: QW,S$GLB,, | Performed by: NURSE PRACTITIONER

## 2019-07-15 PROCEDURE — 3008F PR BODY MASS INDEX (BMI) DOCUMENTED: ICD-10-PCS | Mod: CPTII,S$GLB,, | Performed by: NURSE PRACTITIONER

## 2019-07-15 PROCEDURE — 99214 PR OFFICE/OUTPT VISIT, EST, LEVL IV, 30-39 MIN: ICD-10-PCS | Mod: 25,S$GLB,, | Performed by: NURSE PRACTITIONER

## 2019-07-15 RX ORDER — IBUPROFEN 800 MG/1
800 TABLET ORAL 3 TIMES DAILY
Qty: 30 TABLET | Refills: 0 | Status: SHIPPED | OUTPATIENT
Start: 2019-07-15 | End: 2020-09-21 | Stop reason: CLARIF

## 2019-07-15 RX ORDER — BECLOMETHASONE DIPROPIONATE HFA 40 UG/1
AEROSOL, METERED RESPIRATORY (INHALATION)
Refills: 2 | COMMUNITY
Start: 2019-07-03 | End: 2020-02-10 | Stop reason: SDUPTHER

## 2019-07-15 RX ORDER — METHYLPREDNISOLONE 4 MG/1
TABLET ORAL
Qty: 1 PACKAGE | Refills: 0 | Status: SHIPPED | OUTPATIENT
Start: 2019-07-15 | End: 2020-09-21 | Stop reason: CLARIF

## 2019-07-15 RX ORDER — AMOXICILLIN AND CLAVULANATE POTASSIUM 875; 125 MG/1; MG/1
1 TABLET, FILM COATED ORAL 2 TIMES DAILY
Qty: 20 TABLET | Refills: 0 | Status: SHIPPED | OUTPATIENT
Start: 2019-07-15 | End: 2019-07-25

## 2019-07-15 NOTE — PATIENT INSTRUCTIONS
Pharyngitis: Strep (Presumed)    You have pharyngitis (sore throat). The cause is thought to be the streptococcus, or strep, bacterium. Strep throat infection can cause throat pain that is worse when swallowing, aching all over, headache, and fever. The infection may be spread by coughing, kissing, or touching others after touching your mouth or nose. Antibiotic medications are given to treat the infection.  Home care  · Rest at home. Drink plenty of fluids to avoid dehydration.  · No work or school for the first 2 days of taking the antibiotics. After this time, you will not be contagious. You can then return to work or school if you are feeling better.   · The antibiotic medication must be taken for the full 10 days, even if you feel better. This is very important to ensure the infection is treated. It is also important to prevent drug-resistant organisms from developing. If you were given an antibiotic shot, no more antibiotics are needed.  · You may use acetaminophen or ibuprofen to control pain or fever, unless another medicine was prescribed for this. If you have chronic liver or kidney disease or ever had a stomach ulcer or GI bleeding, talk with your doctor before using these medicines.  · Throat lozenges or a throat-numbing sprays can help reduce throat pain. Gargling with warm salt water can also help. Dissolve 1/2 teaspoon of salt in 1 8 ounce glass of warm water.   · Avoid salty or spicy foods, which can irritate the throat.  Follow-up care  Follow up with your healthcare provider or our staff if you are not improving over the next week.  When to seek medical advice  Call your healthcare provider right away if any of these occur:  · Fever as directed by your doctor.   · New or worsening ear pain, sinus pain, or headache  · Painful lumps in the back of neck  · Stiff neck  · Lymph nodes are getting larger  · Inability to swallow liquids, excessive drooling, or inability to open mouth wide due to throat  pain  · Signs of dehydration (very dark urine or no urine, sunken eyes, dizziness)  · Trouble breathing or noisy breathing  · Muffled voice  · New rash  Date Last Reviewed: 4/13/2015  © 9970-3777 [employer]. [address] [place] [address], [place] [address]. All rights reserved. This information is not intended as a substitute for professional medical care. Always follow your healthcare professional's instructions.

## 2019-07-15 NOTE — PROGRESS NOTES
Subjective:       Patient ID: Solomon Busby Jr. is a 33 y.o. male.    Chief Complaint: Sore Throat    Sore Throat    This is a new problem. The current episode started in the past 7 days. The problem has been gradually worsening. There has been no fever. The pain is at a severity of 8/10. The pain is moderate. Associated symptoms include congestion, coughing, ear pain, headaches, a hoarse voice, a plugged ear sensation, swollen glands and trouble swallowing (hurts to swallow). Pertinent negatives include no abdominal pain, diarrhea, drooling, ear discharge, neck pain, shortness of breath, stridor or vomiting. He has had no exposure to strep or mono. He has tried nothing (zyrtec, flonase, benadryl, throat  spray) for the symptoms.     Review of Systems   Constitutional: Negative for chills, diaphoresis, fatigue and fever.   HENT: Positive for congestion, ear pain, hoarse voice, rhinorrhea, sinus pressure, sinus pain, sneezing, sore throat and trouble swallowing (hurts to swallow). Negative for drooling, ear discharge, postnasal drip, tinnitus and voice change.    Respiratory: Positive for cough. Negative for chest tightness, shortness of breath and stridor.    Gastrointestinal: Negative for abdominal pain, diarrhea and vomiting.   Musculoskeletal: Positive for myalgias. Negative for neck pain.   Neurological: Positive for headaches.       Objective:      Physical Exam   Constitutional: He is oriented to person, place, and time. Vital signs are normal. He appears well-developed and well-nourished.   HENT:   Head: Normocephalic and atraumatic.   Right Ear: External ear normal.   Left Ear: External ear normal.   Nose: Mucosal edema present. No rhinorrhea. Right sinus exhibits no maxillary sinus tenderness and no frontal sinus tenderness. Left sinus exhibits no maxillary sinus tenderness and no frontal sinus tenderness.   Mouth/Throat: Mucous membranes are normal. Posterior oropharyngeal edema and posterior  oropharyngeal erythema present. No oropharyngeal exudate. Tonsils are 2+ on the right. Tonsils are 2+ on the left. Tonsillar exudate.   Cardiovascular: Normal rate, regular rhythm and normal heart sounds.   Pulmonary/Chest: Effort normal and breath sounds normal.   Abdominal: Soft. Bowel sounds are normal.   Neurological: He is alert and oriented to person, place, and time.   Skin: Skin is warm, dry and intact.   Psychiatric: He has a normal mood and affect.       Assessment:       1. Pharyngotonsillitis    2. Sore throat        Plan:       Solomon was seen today for sore throat.    Diagnoses and all orders for this visit:    Pharyngotonsillitis  -     amoxicillin-clavulanate 875-125mg (AUGMENTIN) 875-125 mg per tablet; Take 1 tablet by mouth 2 (two) times daily. for 10 days  -     methylPREDNISolone (MEDROL DOSEPACK) 4 mg tablet; use as directed  -     ibuprofen (ADVIL,MOTRIN) 800 MG tablet; Take 1 tablet (800 mg total) by mouth 3 (three) times daily.    Sore throat  -     methylPREDNISolone (MEDROL DOSEPACK) 4 mg tablet; use as directed  -     ibuprofen (ADVIL,MOTRIN) 800 MG tablet; Take 1 tablet (800 mg total) by mouth 3 (three) times daily.  -     POCT Strep A, Molecular    Home care  · Rest at home. Drink plenty of fluids to avoid dehydration.  · No work or school for the first 2 days of taking the antibiotics. After this time, you will not be contagious. You can then return to work or school if you are feeling better.   · The antibiotic medication must be taken for the full 10 days, even if you feel better. This is very important to ensure the infection is treated. It is also important to prevent drug-resistant organisms from developing. If you were given an antibiotic shot, no more antibiotics are needed.  · You may use acetaminophen or ibuprofen to control pain or fever, unless another medicine was prescribed for this. If you have chronic liver or kidney disease or ever had a stomach ulcer or GI bleeding,  talk with your doctor before using these medicines.  · Throat lozenges or a throat-numbing sprays can help reduce throat pain. Gargling with warm salt water can also help. Dissolve 1/2 teaspoon of salt in 1 8 ounce glass of warm water.   · Avoid salty or spicy foods, which can irritate the throat.  Follow-up care  Follow up with your healthcare provider or our staff if you are not improving over the next week.  When to seek medical advice  Call your healthcare provider right away if any of these occur:  · Fever as directed by your doctor.   · New or worsening ear pain, sinus pain, or headache  · Painful lumps in the back of neck  · Stiff neck  · Lymph nodes are getting larger  · Inability to swallow liquids, excessive drooling, or inability to open mouth wide due to throat pain  · Signs of dehydration (very dark urine or no urine, sunken eyes, dizziness)  · Trouble breathing or noisy breathing  · Muffled voice  · New rash  Date Last Reviewed: 4/13/2015  © 9488-4459 [employer]. [address] [place] [address], [place] [address]. All rights reserved. This information is not intended as a substitute for professional medical care. Always follow your healthcare professional's instructions.

## 2019-07-25 DIAGNOSIS — J45.20 MILD INTERMITTENT ASTHMA WITHOUT COMPLICATION: ICD-10-CM

## 2019-07-25 RX ORDER — ALBUTEROL SULFATE 90 UG/1
AEROSOL, METERED RESPIRATORY (INHALATION)
Qty: 18 INHALER | Refills: 2 | Status: SHIPPED | OUTPATIENT
Start: 2019-07-25 | End: 2021-07-02 | Stop reason: SDUPTHER

## 2019-10-15 ENCOUNTER — NURSE TRIAGE (OUTPATIENT)
Dept: ADMINISTRATIVE | Facility: CLINIC | Age: 34
End: 2019-10-15

## 2019-10-15 NOTE — TELEPHONE ENCOUNTER
Reason for Disposition   Headache (with neurologic deficit)    Additional Information   Negative: Difficult to awaken or acting confused (e.g., disoriented, slurred speech)   Negative: New neurologic deficit that is present NOW, sudden onset of ANY of the following: * Weakness of the face, arm, or leg on one side of the body * Numbness of the face, arm, or leg on one side of the body * Loss of speech or garbled speech   Negative: Sounds like a life-threatening emergency to the triager    Protocols used: NEUROLOGIC DEFICIT-A-OH  Pt called stated he has been having numbness in both his hands but rt hand has been more frequently for the last 2 weeks. Pt also c/o headache 6/10. Pt also stated his sinus has been acting up. Care advice recommends pt go to Er. Pt going to er at [facility].

## 2019-11-04 ENCOUNTER — CLINICAL SUPPORT (OUTPATIENT)
Dept: FAMILY MEDICINE | Facility: CLINIC | Age: 34
End: 2019-11-04
Payer: COMMERCIAL

## 2019-11-04 DIAGNOSIS — Z23 NEED FOR INFLUENZA VACCINATION: Primary | ICD-10-CM

## 2019-11-04 PROCEDURE — 99499 UNLISTED E&M SERVICE: CPT | Mod: S$GLB,,, | Performed by: FAMILY MEDICINE

## 2019-11-04 PROCEDURE — 90471 IMMUNIZATION ADMIN: CPT | Mod: S$GLB,,, | Performed by: FAMILY MEDICINE

## 2019-11-04 PROCEDURE — 90471 FLU VACCINE (QUAD) GREATER THAN OR EQUAL TO 3YO PRESERVATIVE FREE IM: ICD-10-PCS | Mod: S$GLB,,, | Performed by: FAMILY MEDICINE

## 2019-11-04 PROCEDURE — 99499 NO LOS: ICD-10-PCS | Mod: S$GLB,,, | Performed by: FAMILY MEDICINE

## 2019-11-04 PROCEDURE — 90686 IIV4 VACC NO PRSV 0.5 ML IM: CPT | Mod: S$GLB,,, | Performed by: FAMILY MEDICINE

## 2019-11-04 PROCEDURE — 90686 FLU VACCINE (QUAD) GREATER THAN OR EQUAL TO 3YO PRESERVATIVE FREE IM: ICD-10-PCS | Mod: S$GLB,,, | Performed by: FAMILY MEDICINE

## 2019-11-12 ENCOUNTER — OFFICE VISIT (OUTPATIENT)
Dept: FAMILY MEDICINE | Facility: CLINIC | Age: 34
End: 2019-11-12
Payer: COMMERCIAL

## 2019-11-12 VITALS
OXYGEN SATURATION: 97 % | BODY MASS INDEX: 32.82 KG/M2 | SYSTOLIC BLOOD PRESSURE: 130 MMHG | WEIGHT: 192.25 LBS | DIASTOLIC BLOOD PRESSURE: 74 MMHG | HEIGHT: 64 IN | HEART RATE: 68 BPM | TEMPERATURE: 98 F

## 2019-11-12 DIAGNOSIS — J06.9 VIRAL URI: Primary | ICD-10-CM

## 2019-11-12 DIAGNOSIS — G43.809 OTHER MIGRAINE WITHOUT STATUS MIGRAINOSUS, NOT INTRACTABLE: ICD-10-CM

## 2019-11-12 PROCEDURE — 3008F PR BODY MASS INDEX (BMI) DOCUMENTED: ICD-10-PCS | Mod: CPTII,S$GLB,, | Performed by: FAMILY MEDICINE

## 2019-11-12 PROCEDURE — 99999 PR PBB SHADOW E&M-EST. PATIENT-LVL III: ICD-10-PCS | Mod: PBBFAC,,, | Performed by: FAMILY MEDICINE

## 2019-11-12 PROCEDURE — 99999 PR PBB SHADOW E&M-EST. PATIENT-LVL III: CPT | Mod: PBBFAC,,, | Performed by: FAMILY MEDICINE

## 2019-11-12 PROCEDURE — 99214 OFFICE O/P EST MOD 30 MIN: CPT | Mod: S$GLB,,, | Performed by: FAMILY MEDICINE

## 2019-11-12 PROCEDURE — 3008F BODY MASS INDEX DOCD: CPT | Mod: CPTII,S$GLB,, | Performed by: FAMILY MEDICINE

## 2019-11-12 PROCEDURE — 99214 PR OFFICE/OUTPT VISIT, EST, LEVL IV, 30-39 MIN: ICD-10-PCS | Mod: S$GLB,,, | Performed by: FAMILY MEDICINE

## 2019-11-12 RX ORDER — NAPROXEN 500 MG/1
500 TABLET ORAL 2 TIMES DAILY PRN
Qty: 30 TABLET | Refills: 0 | Status: SHIPPED | OUTPATIENT
Start: 2019-11-12 | End: 2019-12-06 | Stop reason: SDUPTHER

## 2019-11-12 RX ORDER — LORATADINE 10 MG/1
10 TABLET ORAL DAILY PRN
Qty: 30 TABLET | Refills: 0 | Status: SHIPPED | OUTPATIENT
Start: 2019-11-12 | End: 2019-12-04 | Stop reason: SDUPTHER

## 2019-11-12 RX ORDER — CODEINE PHOSPHATE AND GUAIFENESIN 10; 100 MG/5ML; MG/5ML
5 SOLUTION ORAL EVERY 8 HOURS PRN
Qty: 180 ML | Refills: 0 | Status: SHIPPED | OUTPATIENT
Start: 2019-11-12 | End: 2019-11-22

## 2019-11-12 NOTE — PROGRESS NOTES
[facility]  Progress Note    SUBJECTIVE:     Chief Complaint   Patient presents with    Migraine    Sore Throat       HPI   Solomon Busby Jr.  is a 34 y.o. male here for c/o cough, congestion, sore throat for the past week. Feels hoarse as well. Onset was sudden. His kids are sick as well. No fevers, chills, SOB. Hasn't tried anything for it. Patient has no other new complaints/problems at this time.      Review of patient's allergies indicates:  No Known Allergies    Past Medical History:   Diagnosis Date    Asthma     Headache     Hernia      Past Surgical History:   Procedure Laterality Date    HERNIA REPAIR       Family History   Problem Relation Age of Onset    Diabetes Mother     Hypertension Mother     Carpal tunnel syndrome Father     Liver disease Father     No Known Problems Sister     No Known Problems Brother     Allergies Daughter     Asthma Daughter     No Known Problems Son     No Known Problems Sister     No Known Problems Sister     No Known Problems Brother     No Known Problems Brother      Social History     Tobacco Use    Smoking status: Never Smoker    Smokeless tobacco: Never Used   Substance Use Topics    Alcohol use: No    Drug use: No        Review of Systems   Constitutional: Negative for chills, fever and malaise/fatigue.   HENT: Positive for congestion and sore throat. Negative for hearing loss.    Respiratory: Positive for cough. Negative for shortness of breath and wheezing.    Cardiovascular: Negative for chest pain.   Gastrointestinal: Negative for nausea and vomiting.   Musculoskeletal: Negative for neck pain.   Skin: Negative for itching and rash.   Neurological: Positive for headaches. Negative for weakness.   All other systems reviewed and are negative.    OBJECTIVE:     Vitals:    11/12/19 1103   BP: 130/74   Pulse: 68   Temp: 98.2 °F (36.8 °C)     Body mass index is 33 kg/m².    Physical Exam   Constitutional: He is oriented to person, place,  and time. He appears distressed (mild).   HENT:   Head: Normocephalic and atraumatic.   Right Ear: External ear normal. Tympanic membrane is not perforated, not erythematous, not retracted and not bulging. No hemotympanum.   Left Ear: External ear normal. Tympanic membrane is not perforated, not erythematous, not retracted and not bulging. No hemotympanum.   Nose: Nose normal.   Mouth/Throat: Oropharynx is clear and moist. No oropharyngeal exudate.   + erythemic posterior pharynx   Eyes: Conjunctivae and EOM are normal.   Cardiovascular: Normal rate, regular rhythm and normal heart sounds. Exam reveals no gallop and no friction rub.   No murmur heard.  Pulmonary/Chest: Effort normal and breath sounds normal. No respiratory distress. He has no wheezes. He has no rales. He exhibits no tenderness.   Abdominal: Soft. Bowel sounds are normal. He exhibits no distension. There is no tenderness. There is no rebound.   Neurological: He is alert and oriented to person, place, and time.   Skin: Skin is warm. He is not diaphoretic.       Old records were reviewed. Labs and/or images were independently reviewed.    ASSESSMENT     1. Viral URI    2. Other migraine without status migrainosus, not intractable        PLAN:     Viral URI  -     loratadine (CLARITIN) 10 mg tablet; Take 1 tablet (10 mg total) by mouth daily as needed for Allergies (or runny nose).  Dispense: 30 tablet; Refill: 0  -     guaifenesin-codeine 100-10 mg/5 ml (CHERATUSSIN AC)  mg/5 mL syrup; Take 5 mLs by mouth every 8 (eight) hours as needed for Cough or Congestion.  Dispense: 180 mL; Refill: 0  -     OK to take tylenol as needed PRN fever. Take mucinex and or claritin to help decrease congestion. Educated patient to drink plenty of fluids and to take vitamin C to help boost immune system. Instructed patient to call or RTC if symptoms persist or worsen.     Other migraine without status migrainosus, not intractable  -     naproxen (NAPROSYN) 500 MG  tablet; Take 1 tablet (500 mg total) by mouth 2 (two) times daily as needed.  Dispense: 30 tablet; Refill: 0  -     Discussed importance of getting at least 8 hours of sleep at night. We did discuss migraine prophylaxis therapy but will hold off for now. Naproxen PRN.      RTC PRN    Kaushik Shaw MD  11/12/2019 11:16 AM

## 2019-11-12 NOTE — LETTER
November 12, 2019      [facility]  [address] [place] [address]  Phone: [phone]  Fax: [phone]       Patient: Solomon Busby   YOB: 1985  Date of Visit: 11/12/2019    To Whom It May Concern:    Charles Busby  was at [facility] on 11/12/2019. He may return to work/school on 11/13/19   with no restrictions. If you have any questions or concerns, or if I can be of further assistance, please do not hesitate to contact me.    Sincerely,        Kaushik Shaw MD

## 2019-12-04 DIAGNOSIS — J06.9 VIRAL URI: ICD-10-CM

## 2019-12-04 RX ORDER — LORATADINE 10 MG/1
10 TABLET ORAL DAILY PRN
Qty: 30 TABLET | Refills: 3 | Status: SHIPPED | OUTPATIENT
Start: 2019-12-04 | End: 2020-09-21 | Stop reason: CLARIF

## 2019-12-06 DIAGNOSIS — G43.809 OTHER MIGRAINE WITHOUT STATUS MIGRAINOSUS, NOT INTRACTABLE: ICD-10-CM

## 2019-12-06 RX ORDER — NAPROXEN 500 MG/1
TABLET ORAL
Qty: 30 TABLET | Refills: 0 | Status: SHIPPED | OUTPATIENT
Start: 2019-12-06 | End: 2020-08-06

## 2020-02-10 DIAGNOSIS — J45.20 MILD INTERMITTENT ASTHMA WITHOUT COMPLICATION: Primary | ICD-10-CM

## 2020-02-10 RX ORDER — BECLOMETHASONE DIPROPIONATE HFA 40 UG/1
AEROSOL, METERED RESPIRATORY (INHALATION)
Qty: 10.6 G | Refills: 2 | Status: SHIPPED | OUTPATIENT
Start: 2020-02-10 | End: 2020-03-13 | Stop reason: SDUPTHER

## 2020-02-11 ENCOUNTER — HOSPITAL ENCOUNTER (EMERGENCY)
Facility: HOSPITAL | Age: 35
Discharge: HOME | End: 2020-02-11
Attending: INTERNAL MEDICINE
Payer: COMMERCIAL

## 2020-02-11 VITALS
HEART RATE: 83 BPM | BODY MASS INDEX: 28.93 KG/M2 | WEIGHT: 180 LBS | OXYGEN SATURATION: 96 % | RESPIRATION RATE: 22 BRPM | TEMPERATURE: 99 F | DIASTOLIC BLOOD PRESSURE: 81 MMHG | HEIGHT: 66 IN | SYSTOLIC BLOOD PRESSURE: 140 MMHG

## 2020-02-11 DIAGNOSIS — J06.9 ACUTE URI: Primary | ICD-10-CM

## 2020-02-11 DIAGNOSIS — R07.9 CHEST PAIN: ICD-10-CM

## 2020-02-11 PROCEDURE — 93010 EKG 12-LEAD: ICD-10-PCS | Mod: ,,, | Performed by: INTERNAL MEDICINE

## 2020-02-11 PROCEDURE — 63600175 PHARM REV CODE 636 W HCPCS: Mod: ER | Performed by: INTERNAL MEDICINE

## 2020-02-11 PROCEDURE — 99284 EMERGENCY DEPT VISIT MOD MDM: CPT | Mod: 25,ER

## 2020-02-11 PROCEDURE — 93005 ELECTROCARDIOGRAM TRACING: CPT | Mod: ER

## 2020-02-11 PROCEDURE — 93010 ELECTROCARDIOGRAM REPORT: CPT | Mod: ,,, | Performed by: INTERNAL MEDICINE

## 2020-02-11 RX ORDER — FLUTICASONE PROPIONATE 50 MCG
2 SPRAY, SUSPENSION (ML) NASAL DAILY
Qty: 15 G | Refills: 0 | Status: SHIPPED | OUTPATIENT
Start: 2020-02-11

## 2020-02-11 RX ORDER — AZELASTINE 1 MG/ML
2 SPRAY, METERED NASAL 2 TIMES DAILY
Qty: 30 ML | Refills: 0 | Status: SHIPPED | OUTPATIENT
Start: 2020-02-11 | End: 2020-09-21 | Stop reason: CLARIF

## 2020-02-11 RX ORDER — PREDNISONE 20 MG/1
40 TABLET ORAL
Status: COMPLETED | OUTPATIENT
Start: 2020-02-11 | End: 2020-02-11

## 2020-02-11 RX ADMIN — PREDNISONE 40 MG: 20 TABLET ORAL at 06:02

## 2020-02-11 NOTE — ED PROVIDER NOTES
Encounter Date: 2/11/2020       History   Chief complaint:  Cough, shortness of breath, chest tightness for the past few hours  34-year-old male presents to the emergency department complaining of shortness of breath for the past several hours.  He states he has a history of asthma feels like he is having an asthma exacerbation.  He denies chest pain but states his chest feels tight.  He denies fever/chills/nausea/vomiting.  He states he used his albuterol inhaler at home without significant improvement shortness of breath.    The history is provided by the patient. No  was used.     Review of patient's allergies indicates:  No Known Allergies  Past Medical History:   Diagnosis Date    Asthma     Headache     Hernia      Past Surgical History:   Procedure Laterality Date    HERNIA REPAIR       Family History   Problem Relation Age of Onset    Diabetes Mother     Hypertension Mother     Carpal tunnel syndrome Father     Liver disease Father     No Known Problems Sister     No Known Problems Brother     Allergies Daughter     Asthma Daughter     No Known Problems Son     No Known Problems Sister     No Known Problems Sister     No Known Problems Brother     No Known Problems Brother      Social History     Tobacco Use    Smoking status: Never Smoker    Smokeless tobacco: Never Used   Substance Use Topics    Alcohol use: No    Drug use: No     Review of Systems   Constitutional: Negative for chills and fever.   Respiratory: Positive for shortness of breath.    Gastrointestinal: Negative for diarrhea, nausea and vomiting.   All other systems reviewed and are negative.      Physical Exam     Initial Vitals [02/11/20 0547]   BP Pulse Resp Temp SpO2   123/73 93 (!) 22 98.8 °F (37.1 °C) 97 %      MAP       --         Physical Exam    Nursing note and vitals reviewed.  Constitutional: He appears well-developed and well-nourished.   HENT:   Head: Normocephalic and atraumatic.   Right  "Ear: External ear normal.   Left Ear: External ear normal.   Enlarged nasal turbinates, clear postnasal drip, posterior oropharyngeal erythema without exudate or edema   Eyes: Conjunctivae are normal.   Neck: Normal range of motion. Neck supple.   Cardiovascular: Normal rate, regular rhythm and normal heart sounds.   Pulmonary/Chest: Breath sounds normal. No respiratory distress. He has no wheezes. He has no rhonchi. He has no rales. He exhibits no tenderness.   Abdominal: Soft. Bowel sounds are normal.   Musculoskeletal: Normal range of motion.   Neurological: He is alert and oriented to person, place, and time. He has normal strength.   Skin: Skin is warm and dry. Capillary refill takes less than 2 seconds.   Psychiatric: He has a normal mood and affect. Thought content normal.         ED Course   Procedures  Labs Reviewed - No data to display  EKG Readings: (Independently Interpreted)   Initial Reading: No STEMI. Rhythm: Normal Sinus Rhythm. Heart Rate: 91. Ectopy: No Ectopy. T Waves: Normal.       Imaging Results    None          Medical Decision Making:   Initial Assessment:   34-year-old male presents to the emergency department complaining of shortness of breath for the past several hours.  He states he has a history of asthma feels like he is having an asthma exacerbation.  He denies chest pain but states his chest "feels tight".  He denies fever/chills/nausea/vomiting.  He states he used his albuterol inhaler at home without significant improvement shortness of breath.  ED Management:  Patient was given instructions for acute URI and advised to follow up with primary care physician within the next 2 days for re-evaluation/return to the emergency department condition worsens.  Prescriptions for Astelin/fluticasone were given prior to discharge.                                 Clinical Impression:       ICD-10-CM ICD-9-CM   1. Acute URI J06.9 465.9   2. Chest pain R07.9 786.50         Disposition: "   Disposition: Discharged  Condition: Stable                     Jamshid Davis MD  02/11/20 0620       Jamshid Davis MD  02/11/20 0625

## 2020-03-13 ENCOUNTER — OFFICE VISIT (OUTPATIENT)
Dept: FAMILY MEDICINE | Facility: CLINIC | Age: 35
End: 2020-03-13
Payer: COMMERCIAL

## 2020-03-13 ENCOUNTER — TELEPHONE (OUTPATIENT)
Dept: FAMILY MEDICINE | Facility: CLINIC | Age: 35
End: 2020-03-13

## 2020-03-13 VITALS
WEIGHT: 192.38 LBS | HEART RATE: 73 BPM | BODY MASS INDEX: 30.92 KG/M2 | TEMPERATURE: 99 F | HEIGHT: 66 IN | OXYGEN SATURATION: 97 % | DIASTOLIC BLOOD PRESSURE: 68 MMHG | RESPIRATION RATE: 20 BRPM | SYSTOLIC BLOOD PRESSURE: 110 MMHG

## 2020-03-13 DIAGNOSIS — J98.01 BRONCHOSPASM: ICD-10-CM

## 2020-03-13 DIAGNOSIS — M62.830 BACK SPASM: ICD-10-CM

## 2020-03-13 DIAGNOSIS — J45.20 MILD INTERMITTENT ASTHMA WITHOUT COMPLICATION: ICD-10-CM

## 2020-03-13 DIAGNOSIS — J06.9 VIRAL URI: Primary | ICD-10-CM

## 2020-03-13 PROBLEM — R07.9 CHEST PAIN: Status: RESOLVED | Noted: 2020-02-11 | Resolved: 2020-03-13

## 2020-03-13 PROCEDURE — 3008F BODY MASS INDEX DOCD: CPT | Mod: CPTII,S$GLB,, | Performed by: FAMILY MEDICINE

## 2020-03-13 PROCEDURE — 3008F PR BODY MASS INDEX (BMI) DOCUMENTED: ICD-10-PCS | Mod: CPTII,S$GLB,, | Performed by: FAMILY MEDICINE

## 2020-03-13 PROCEDURE — 99999 PR PBB SHADOW E&M-EST. PATIENT-LVL III: ICD-10-PCS | Mod: PBBFAC,,, | Performed by: FAMILY MEDICINE

## 2020-03-13 PROCEDURE — 94640 PR INHAL RX, AIRWAY OBST/DX SPUTUM INDUCT: ICD-10-PCS | Mod: S$GLB,,, | Performed by: FAMILY MEDICINE

## 2020-03-13 PROCEDURE — 94640 AIRWAY INHALATION TREATMENT: CPT | Mod: S$GLB,,, | Performed by: FAMILY MEDICINE

## 2020-03-13 PROCEDURE — 99214 OFFICE O/P EST MOD 30 MIN: CPT | Mod: 25,S$GLB,, | Performed by: FAMILY MEDICINE

## 2020-03-13 PROCEDURE — 99999 PR PBB SHADOW E&M-EST. PATIENT-LVL III: CPT | Mod: PBBFAC,,, | Performed by: FAMILY MEDICINE

## 2020-03-13 PROCEDURE — 99214 PR OFFICE/OUTPT VISIT, EST, LEVL IV, 30-39 MIN: ICD-10-PCS | Mod: 25,S$GLB,, | Performed by: FAMILY MEDICINE

## 2020-03-13 RX ORDER — IPRATROPIUM BROMIDE AND ALBUTEROL SULFATE 2.5; .5 MG/3ML; MG/3ML
3 SOLUTION RESPIRATORY (INHALATION) EVERY 6 HOURS PRN
Qty: 100 VIAL | Refills: 1 | Status: SHIPPED | OUTPATIENT
Start: 2020-03-13 | End: 2021-01-28 | Stop reason: SDUPTHER

## 2020-03-13 RX ORDER — LORATADINE 10 MG/1
10 TABLET ORAL DAILY PRN
Qty: 30 TABLET | Refills: 0 | Status: SHIPPED | OUTPATIENT
Start: 2020-03-13 | End: 2020-09-21 | Stop reason: CLARIF

## 2020-03-13 RX ORDER — IPRATROPIUM BROMIDE AND ALBUTEROL SULFATE 2.5; .5 MG/3ML; MG/3ML
3 SOLUTION RESPIRATORY (INHALATION)
Status: COMPLETED | OUTPATIENT
Start: 2020-03-13 | End: 2020-03-13

## 2020-03-13 RX ORDER — CYCLOBENZAPRINE HCL 10 MG
10 TABLET ORAL 3 TIMES DAILY PRN
Qty: 30 TABLET | Refills: 0 | Status: SHIPPED | OUTPATIENT
Start: 2020-03-13 | End: 2022-04-22 | Stop reason: SDUPTHER

## 2020-03-13 RX ORDER — BECLOMETHASONE DIPROPIONATE HFA 40 UG/1
AEROSOL, METERED RESPIRATORY (INHALATION)
Qty: 10.6 G | Refills: 2 | Status: SHIPPED | OUTPATIENT
Start: 2020-03-13 | End: 2021-07-02 | Stop reason: SDUPTHER

## 2020-03-13 RX ORDER — CODEINE PHOSPHATE AND GUAIFENESIN 10; 100 MG/5ML; MG/5ML
5 SOLUTION ORAL EVERY 8 HOURS PRN
Qty: 180 ML | Refills: 0 | Status: SHIPPED | OUTPATIENT
Start: 2020-03-13 | End: 2020-03-23

## 2020-03-13 RX ADMIN — IPRATROPIUM BROMIDE AND ALBUTEROL SULFATE 3 ML: 2.5; .5 SOLUTION RESPIRATORY (INHALATION) at 02:03

## 2020-03-13 NOTE — TELEPHONE ENCOUNTER
----- Message from Mary Diehl sent at 3/13/2020  7:57 AM CDT -----  Contact: Solomon [phone]  Type:  Same Day Appointment Request    Caller is requesting a same day appointment.  Caller declined first available   appointment listed below.      Name of Caller: Solomon     When is the first available appointment? 03/25    Symptoms: bronchitis like symptoms. Patient states that he has been taking medication such as delsym and mucinex and nothing seems to be working. He also states that he has ran out of medication for his breathing machine. Patient also stated that he suffers from asthma, but his symptoms are not an asthma attack like.     Would the patient rather a call back or a response via [facility]? Call back     Best Call Back Number:[phone]    Additional Information: (#Patient would like to be seen today if possible or have medication sent to the pharmacy for his bronchitis/ asthma#. The pharmacy of choice is: [facility] #[id number] [place] [place] - [address])  
Patient refused to schedule appointment requesting to see pcp or another provider now/today for cold and cough  
Patient scheduled for today  
None

## 2020-03-16 NOTE — PROGRESS NOTES
[facility]  Progress Note    SUBJECTIVE:     Chief Complaint   Patient presents with    Cough     cold symptoms       HPI   Solomon Busby Jr.  is a 34 y.o. male here for c/o cough, congestion, wheezing for the past week. Onset was sudden but getting worst. No fevers, chills, recent travels. No known sick contacts. Took otc meds without relief. Patient has no other new complaints/problems at this time.      Review of patient's allergies indicates:  No Known Allergies    Past Medical History:   Diagnosis Date    Asthma     Headache     Hernia      Past Surgical History:   Procedure Laterality Date    HERNIA REPAIR       Family History   Problem Relation Age of Onset    Diabetes Mother     Hypertension Mother     Carpal tunnel syndrome Father     Liver disease Father     No Known Problems Sister     No Known Problems Brother     Allergies Daughter     Asthma Daughter     No Known Problems Son     No Known Problems Sister     No Known Problems Sister     No Known Problems Brother     No Known Problems Brother      Social History     Tobacco Use    Smoking status: Never Smoker    Smokeless tobacco: Never Used   Substance Use Topics    Alcohol use: No    Drug use: No        Review of Systems   Constitutional: Negative for chills, fever and malaise/fatigue.   HENT: Positive for congestion. Negative for sore throat.    Respiratory: Positive for cough, shortness of breath and wheezing.    Cardiovascular: Negative.  Negative for chest pain.   Gastrointestinal: Negative.  Negative for abdominal pain, nausea and vomiting.   Genitourinary: Negative.    Neurological: Negative for weakness and headaches.   All other systems reviewed and are negative.    OBJECTIVE:     Vitals:    03/13/20 1428   BP: 110/68   Pulse: 73   Resp: 20   Temp: 98.6 °F (37 °C)     Body mass index is 31.05 kg/m².    Physical Exam   Constitutional: He is oriented to person, place, and time and well-developed, well-nourished,  and in no distress. No distress.   HENT:   Head: Normocephalic and atraumatic.   Right Ear: External ear normal. Tympanic membrane is not perforated, not erythematous, not retracted and not bulging. No hemotympanum.   Left Ear: External ear normal. Tympanic membrane is not perforated, not erythematous, not retracted and not bulging. No hemotympanum.   Nose: Nose normal.   Mouth/Throat: Oropharynx is clear and moist. No oropharyngeal exudate.   Eyes: Conjunctivae and EOM are normal.   Cardiovascular: Normal rate, regular rhythm and normal heart sounds. Exam reveals no gallop and no friction rub.   No murmur heard.  Pulmonary/Chest: Effort normal. No respiratory distress. He has wheezes (in b/l lobes). He has no rales. He exhibits no tenderness.   Abdominal: Soft. Bowel sounds are normal. He exhibits no distension. There is no tenderness. There is no rebound.   Neurological: He is alert and oriented to person, place, and time.   Skin: Skin is warm. He is not diaphoretic.       Old records were reviewed. Labs and/or images were independently reviewed.    ASSESSMENT     1. Viral URI    2. Mild intermittent asthma without complication    3. Bronchospasm    4. Back spasm        PLAN:     Viral URI  -     guaifenesin-codeine 100-10 mg/5 ml (CHERATUSSIN AC)  mg/5 mL syrup; Take 5 mLs by mouth every 8 (eight) hours as needed for Cough or Congestion.  Dispense: 180 mL; Refill: 0  -     loratadine (CLARITIN) 10 mg tablet; Take 1 tablet (10 mg total) by mouth daily as needed for Allergies (or runny nose).  Dispense: 30 tablet; Refill: 0  -     albuterol-ipratropium 2.5 mg-0.5 mg/3 mL nebulizer solution 3 mL  -     OK to take tylenol as needed PRN fever. Take mucinex and or claritin to help decrease congestion. Educated patient to drink plenty of fluids and to take vitamin C to help boost immune system. Instructed patient to call or RTC if symptoms persist or worsen.    Mild intermittent asthma without complication  -      QVAR REDIHALER 40 mcg/actuation HFAB; INHALE 1 PUFF BY MOUTH INTO LUNGS TWICE DAILY  Dispense: 10.6 g; Refill: 2  -     Stable. Continue current regimen.    Bronchospasm  -     albuterol-ipratropium (DUO-NEB) 2.5 mg-0.5 mg/3 mL nebulizer solution; Take 3 mLs by nebulization every 6 (six) hours as needed for Wheezing or Shortness of Breath.  Dispense: 100 vial; Refill: 1    Back spasm  -     cyclobenzaprine (FLEXERIL) 10 MG tablet; Take 1 tablet (10 mg total) by mouth 3 (three) times daily as needed for Muscle spasms.  Dispense: 30 tablet; Refill: 0  -     Patient counseled on good posture and stretching exercises. Continue to place ice packs on affected areas 3-4 times daily. Take medications as prescribed. Instructed patient to call MD if symptoms persist or worsen.    RTC PRN    Kaushik Shaw MD  03/15/2020 8:54 PM

## 2020-03-30 ENCOUNTER — OFFICE VISIT (OUTPATIENT)
Dept: FAMILY MEDICINE | Facility: CLINIC | Age: 35
End: 2020-03-30
Payer: COMMERCIAL

## 2020-03-30 ENCOUNTER — TELEPHONE (OUTPATIENT)
Dept: FAMILY MEDICINE | Facility: CLINIC | Age: 35
End: 2020-03-30

## 2020-03-30 DIAGNOSIS — J06.9 UPPER RESPIRATORY TRACT INFECTION, UNSPECIFIED TYPE: Primary | ICD-10-CM

## 2020-03-30 PROCEDURE — 99213 PR OFFICE/OUTPT VISIT, EST, LEVL III, 20-29 MIN: ICD-10-PCS | Mod: 95,,, | Performed by: FAMILY MEDICINE

## 2020-03-30 PROCEDURE — 99213 OFFICE O/P EST LOW 20 MIN: CPT | Mod: 95,,, | Performed by: FAMILY MEDICINE

## 2020-03-30 NOTE — PROGRESS NOTES
The patient location is: Home  The chief complaint leading to consultation is:Upper rsp congestion   Visit type: Virtual visit with synchronous audio and video  Total time spent with patient: 15 minutes  Each patient to whom he or she provides medical services by telemedicine is:  (1) informed of the relationship between the physician and patient and the respective role of any other health care provider with respect to management of the patient; and (2) notified that he or she may decline to receive medical services by telemedicine and may withdraw from such care at any time.    Notes:   Solomon Busby Jr. presents with mild upper respiratory congestion,rhinnorhea,moderate cough   He was tx by PCP 3/13. No dyspnea Denies nausea,vomiting,diarrhea or significant fever. No fever and Friday will be 14 days self quaranteen     Past Medical History:   Diagnosis Date    Asthma     Headache     Hernia      Past Surgical History:   Procedure Laterality Date    HERNIA REPAIR       Review of patient's allergies indicates:  No Known Allergies  Current Outpatient Medications on File Prior to Visit   Medication Sig Dispense Refill    acetaminophen-codeine 300-30mg (TYLENOL #3) 300-30 mg Tab       albuterol-ipratropium (DUO-NEB) 2.5 mg-0.5 mg/3 mL nebulizer solution Take 3 mLs by nebulization every 6 (six) hours as needed for Wheezing or Shortness of Breath. 100 vial 1    azelastine (ASTELIN) 137 mcg (0.1 %) nasal spray 2 sprays (274 mcg total) by Nasal route 2 (two) times daily. 30 mL 0    cyclobenzaprine (FLEXERIL) 10 MG tablet Take 1 tablet (10 mg total) by mouth 3 (three) times daily as needed for Muscle spasms. 30 tablet 0    fluticasone propionate (FLONASE) 50 mcg/actuation nasal spray 2 sprays (100 mcg total) by Each Nostril route once daily. 15 g 0    HYDROcodone-acetaminophen (NORCO) 5-325 mg per tablet Take 1 tablet by mouth every 4 (four) hours as needed. (Patient not taking: Reported on 3/13/2020) 18 tablet 0     ibuprofen (ADVIL,MOTRIN) 800 MG tablet Take 1 tablet (800 mg total) by mouth 3 (three) times daily. (Patient not taking: Reported on 3/13/2020) 30 tablet 0    levocetirizine (XYZAL) 5 MG tablet Take 1 tablet (5 mg total) by mouth every evening. 30 tablet 11    loratadine (CLARITIN) 10 mg tablet TAKE 1 TABLET (10 MG TOTAL) BY MOUTH DAILY AS NEEDED FOR ALLERGIES (OR RUNNY NOSE). (Patient not taking: Reported on 3/13/2020) 30 tablet 3    loratadine (CLARITIN) 10 mg tablet Take 1 tablet (10 mg total) by mouth daily as needed for Allergies (or runny nose). 30 tablet 0    methylPREDNISolone (MEDROL DOSEPACK) 4 mg tablet use as directed (Patient not taking: Reported on 3/13/2020) 1 Package 0    naproxen (NAPROSYN) 500 MG tablet TAKE 1 TABLET BY MOUTH TWICE A DAY AS NEEDED (Patient not taking: Reported on 3/13/2020) 30 tablet 0    QVAR REDIHALER 40 mcg/actuation HFAB INHALE 1 PUFF BY MOUTH INTO LUNGS TWICE DAILY 10.6 g 2    triamcinolone acetonide 0.1% (KENALOG) 0.1 % ointment Apply topically 2 (two) times daily. for 10 days 30 g 0    VENTOLIN HFA 90 mcg/actuation inhaler INHALE 2 PUFFS BY MOUTH EVERY 6 HOURS AS NEEDED FOR WHEEZING 18 Inhaler 2     No current facility-administered medications on file prior to visit.      Social History     Socioeconomic History    Marital status:      Spouse name: Not on file    Number of children: Not on file    Years of education: Not on file    Highest education level: Not on file   Occupational History    Not on file   Social Needs    Financial resource strain: Not on file    Food insecurity:     Worry: Not on file     Inability: Not on file    Transportation needs:     Medical: Not on file     Non-medical: Not on file   Tobacco Use    Smoking status: Never Smoker    Smokeless tobacco: Never Used   Substance and Sexual Activity    Alcohol use: No    Drug use: No    Sexual activity: Yes     Partners: Female   Lifestyle    Physical activity:     Days per  week: Not on file     Minutes per session: Not on file    Stress: Not on file   Relationships    Social connections:     Talks on phone: Not on file     Gets together: Not on file     Attends [employer] service: Not on file     Active member of club or organization: Not on file     Attends meetings of clubs or organizations: Not on file     Relationship status: Not on file   Other Topics Concern    Not on file   Social History Narrative    Not on file     Family History   Problem Relation Age of Onset    Diabetes Mother     Hypertension Mother     Carpal tunnel syndrome Father     Liver disease Father     No Known Problems Sister     No Known Problems Brother     Allergies Daughter     Asthma Daughter     No Known Problems Son     No Known Problems Sister     No Known Problems Sister     No Known Problems Brother     No Known Problems Brother          ROS:  SKIN: No rashes, itching or changes in color or texture of skin.  EYES: Visual acuity fine. No photophobia, ocular pain or diplopia.EARS: Denies ear pain, discharge or vertigo.NOSE: No loss of smell, no epistaxis some postnasal drip.MOUTH & THROAT: No hoarseness or change in voice. No excessive gum bleeding.CHEST: Denies GONZALES, cyanosis, wheezing  CARDIOVASCULAR: Denies chest pain, PND, orthopnea or reduced exercise tolerance.  ABDOMEN:  No weight loss.No abdominal pain, no hematemesis or blood in stool.  URINARY: No flank pain, dysuria or hematuria.  PERIPHERAL VASCULAR: No claudication or cyanosis.  MUSCULOSKELETAL: Negative   NEUROLOGIC: No history of seizures, paralysis, alteration of gait or coordination.    PE: Heent:Normocephalic with no recent cranial trauma,PERRLA,EOMI,conjunctiva clear,Nasal mucosa slightly red and edematous.Posterior pharynx slightly red but without exudate.  Chest:No tachypnea. No wheezing, rhonchi on forced expiration  Abdomen:Soft, non tender to patient palpation  Impression: Upper Respiratory Infection. 465.9  cleared  Plan: OK to return to work 4/3/20

## 2020-03-30 NOTE — TELEPHONE ENCOUNTER
----- Message from Micha Hernandez MD sent at 3/30/2020 10:13 AM CDT -----  Please send message on my chart to patient. Solomon Aram has quarantined at our direction since 3/20.  OK to return to work 4/3/20 with no restrictions.

## 2020-08-04 ENCOUNTER — HOSPITAL ENCOUNTER (EMERGENCY)
Facility: HOSPITAL | Age: 35
Discharge: HOME | End: 2020-08-04
Attending: EMERGENCY MEDICINE
Payer: COMMERCIAL

## 2020-08-04 VITALS
HEIGHT: 69 IN | SYSTOLIC BLOOD PRESSURE: 120 MMHG | RESPIRATION RATE: 18 BRPM | DIASTOLIC BLOOD PRESSURE: 74 MMHG | OXYGEN SATURATION: 99 % | BODY MASS INDEX: 25.92 KG/M2 | HEART RATE: 98 BPM | WEIGHT: 175 LBS | TEMPERATURE: 98 F

## 2020-08-04 DIAGNOSIS — M25.569 KNEE PAIN: Primary | ICD-10-CM

## 2020-08-04 PROCEDURE — 96372 THER/PROPH/DIAG INJ SC/IM: CPT

## 2020-08-04 PROCEDURE — 29505 APPLICATION LONG LEG SPLINT: CPT | Mod: RT

## 2020-08-04 PROCEDURE — 99284 EMERGENCY DEPT VISIT MOD MDM: CPT | Mod: 25

## 2020-08-04 PROCEDURE — 63600175 PHARM REV CODE 636 W HCPCS: Performed by: PHYSICIAN ASSISTANT

## 2020-08-04 RX ORDER — OXYCODONE AND ACETAMINOPHEN 5; 325 MG/1; MG/1
1 TABLET ORAL EVERY 4 HOURS PRN
Qty: 10 TABLET | Refills: 0 | Status: SHIPPED | OUTPATIENT
Start: 2020-08-04 | End: 2020-09-21 | Stop reason: CLARIF

## 2020-08-04 RX ORDER — HYDROMORPHONE HYDROCHLORIDE 2 MG/ML
1 INJECTION, SOLUTION INTRAMUSCULAR; INTRAVENOUS; SUBCUTANEOUS
Status: COMPLETED | OUTPATIENT
Start: 2020-08-04 | End: 2020-08-04

## 2020-08-04 RX ADMIN — HYDROMORPHONE HYDROCHLORIDE 1 MG: 2 INJECTION, SOLUTION INTRAMUSCULAR; INTRAVENOUS; SUBCUTANEOUS at 07:08

## 2020-08-05 NOTE — ED TRIAGE NOTES
"Pt c/o RIGHT knee pain that started today around 1745. Pt reports he was playing basketball, made a "hard stop" and his R knee buckled under him and he felt a "pop". Pt c/o pain w/ weightbearing, unable to ambulate. Pain is 10/10. Denies taking pain meds PTA  "

## 2020-08-05 NOTE — ED PROVIDER NOTES
Encounter Date: 8/4/2020       History     Chief Complaint   Patient presents with    Knee Pain     EMS reports pt c/o right knee pain after falling while playing basketball. no swelling or deformity noted. tender to palpation. limited range of motion.      Chief Complaint:  Knee pain  History of  Present Illness: History obtained from patient. This 34 y.o. male who has no significant past medical history presents to the ED complaining of right knee pain that began while playing basketball.  Patient states that he planted all his weight on his right leg and felt a pop in his right knee.  He states that he has been unable to bear weight on the right lower extremity since the incident.  Denies history of injury to the knee.  Denies numbness, tingling, weakness.  Pain is 10 10 and worse with weight-bearing and movement.  No prior treatment for symptoms.          Review of patient's allergies indicates:  No Known Allergies  Past Medical History:   Diagnosis Date    Asthma     Headache     Hernia      Past Surgical History:   Procedure Laterality Date    HERNIA REPAIR       Family History   Problem Relation Age of Onset    Diabetes Mother     Hypertension Mother     Carpal tunnel syndrome Father     Liver disease Father     No Known Problems Sister     No Known Problems Brother     Allergies Daughter     Asthma Daughter     No Known Problems Son     No Known Problems Sister     No Known Problems Sister     No Known Problems Brother     No Known Problems Brother      Social History     Tobacco Use    Smoking status: Never Smoker    Smokeless tobacco: Never Used   Substance Use Topics    Alcohol use: No    Drug use: No     Review of Systems   Constitutional: Negative for chills and fever.   HENT: Negative for congestion, rhinorrhea and sore throat.    Eyes: Negative for visual disturbance.   Respiratory: Negative for cough and shortness of breath.    Cardiovascular: Negative for chest pain.    Gastrointestinal: Negative for abdominal pain, diarrhea, nausea and vomiting.   Genitourinary: Negative for dysuria, frequency and hematuria.   Musculoskeletal: Positive for arthralgias. Negative for back pain.   Skin: Negative for rash.   Neurological: Negative for dizziness, weakness and headaches.       Physical Exam     Initial Vitals [08/04/20 1824]   BP Pulse Resp Temp SpO2   116/68 102 18 98 °F (36.7 °C) 98 %      MAP       --         Physical Exam    Nursing note and vitals reviewed.  Constitutional: He appears well-developed and well-nourished. No distress.   HENT:   Head: Normocephalic and atraumatic.   Right Ear: Tympanic membrane normal.   Left Ear: Tympanic membrane normal.   Nose: Nose normal.   Mouth/Throat: Uvula is midline, oropharynx is clear and moist and mucous membranes are normal.   Eyes: EOM are normal. Pupils are equal, round, and reactive to light.   Neck: Trachea normal, normal range of motion, full passive range of motion without pain and phonation normal. Neck supple. No stridor present. No spinous process tenderness and no muscular tenderness present. Normal range of motion present. No neck rigidity.   Cardiovascular: Normal rate, regular rhythm and normal heart sounds. Exam reveals no gallop and no friction rub.    No murmur heard.  Pulmonary/Chest: Effort normal and breath sounds normal. No respiratory distress. He has no wheezes. He has no rhonchi. He has no rales.   Abdominal: Soft. Bowel sounds are normal. He exhibits no mass. There is no abdominal tenderness. There is no rebound and no guarding.   Musculoskeletal:      Right knee: He exhibits decreased range of motion (Secondary to pain). He exhibits no swelling, no effusion, no ecchymosis, no deformity, no laceration and no erythema. Tenderness (Anterior) found.   Neurological: He is alert and oriented to person, place, and time. He has normal strength. No cranial nerve deficit or sensory deficit.   Skin: Skin is warm and dry.  Capillary refill takes less than 2 seconds.   Psychiatric: He has a normal mood and affect.         ED Course   Procedures  Labs Reviewed - No data to display       Imaging Results          X-Ray Knee 3 View Right (Final result)  Result time 08/04/20 19:39:51    Final result by Mahamed Rodarte MD (08/04/20 19:39:51)                 Impression:      1. No acute displaced fracture or dislocation of the knee.      Electronically signed by: Mahamed Rodarte MD  Date:    08/04/2020  Time:    19:39             Narrative:    EXAMINATION:  XR KNEE 3 VIEW RIGHT    CLINICAL HISTORY:  Pain in unspecified knee    TECHNIQUE:  AP, lateral, and Merchant views of the right knee were performed.    COMPARISON:  None    FINDINGS:  Three views right knee.    No acute displaced fracture or dislocation of the knee.  No radiopaque foreign body.  No large knee joint effusion.                                 Medical Decision Making:   Differential Diagnosis:   Fracture, dislocation, septic joint, ligamentous injury, meniscus injury  ED Management:  This is an evaluation of a 34 y.o. male who presents to the ED for right knee pain.  Vital signs are stable.   Afebrile.  Patient is nontoxic appearing and in no acute distress.  There is generalized tenderness to the right knee with significant tenderness to the anterior aspect.  No knee effusion.  No obvious deformity.  Patient has limited active range of motion of the right knee secondary to pain.  However, I am able to passively range of motion the knee without significant discomfort or difficulty.  No ligamentous laxity.  Patient is neurovascularly intact.  X-ray of the right knee shows acute fracture dislocation.  I suspect etiology of patient's knee pain may be ligamentous injury versus meniscus injury.  Patient placed in knee immobilizer and crutches were given.  Patient encouraged follow-up with Orthopedics.    Patient given return precautions and instructed to return to the emergency  department for any new or worsening symptoms. Patient verbalized understanding and agreed with plan.                                  Clinical Impression:       ICD-10-CM ICD-9-CM   1. Knee pain  M25.569 719.46             ED Disposition Condition    Discharge Stable        ED Prescriptions     Medication Sig Dispense Start Date End Date Auth. Provider    oxyCODONE-acetaminophen (PERCOCET) 5-325 mg per tablet Take 1 tablet by mouth every 4 (four) hours as needed. 10 tablet 8/4/2020  Kelvin Shaw PA-C        Follow-up Information     Follow up With Specialties Details Why Contact Info    [facility]   [address]  [facility] [address]  [phone]      [facility] Go in 1 day If symptoms worsen [address]  [facility] [id number] [phone]

## 2020-08-06 ENCOUNTER — OFFICE VISIT (OUTPATIENT)
Dept: ORTHOPEDICS | Facility: CLINIC | Age: 35
End: 2020-08-06
Payer: COMMERCIAL

## 2020-08-06 ENCOUNTER — OFFICE VISIT (OUTPATIENT)
Dept: FAMILY MEDICINE | Facility: CLINIC | Age: 35
End: 2020-08-06
Payer: COMMERCIAL

## 2020-08-06 VITALS
TEMPERATURE: 98 F | SYSTOLIC BLOOD PRESSURE: 116 MMHG | HEIGHT: 69 IN | HEART RATE: 73 BPM | BODY MASS INDEX: 25.84 KG/M2 | RESPIRATION RATE: 18 BRPM | DIASTOLIC BLOOD PRESSURE: 82 MMHG | OXYGEN SATURATION: 97 %

## 2020-08-06 VITALS
OXYGEN SATURATION: 97 % | DIASTOLIC BLOOD PRESSURE: 78 MMHG | HEIGHT: 69 IN | RESPIRATION RATE: 17 BRPM | BODY MASS INDEX: 25.92 KG/M2 | WEIGHT: 175 LBS | HEART RATE: 82 BPM | SYSTOLIC BLOOD PRESSURE: 132 MMHG | TEMPERATURE: 98 F

## 2020-08-06 DIAGNOSIS — S89.91XA INJURY OF RIGHT KNEE, INITIAL ENCOUNTER: Primary | ICD-10-CM

## 2020-08-06 DIAGNOSIS — M25.561 ACUTE PAIN OF RIGHT KNEE: Primary | ICD-10-CM

## 2020-08-06 DIAGNOSIS — S89.91XA INJURY OF RIGHT KNEE, INITIAL ENCOUNTER: ICD-10-CM

## 2020-08-06 PROCEDURE — 99999 PR PBB SHADOW E&M-EST. PATIENT-LVL V: ICD-10-PCS | Mod: PBBFAC,,, | Performed by: ORTHOPAEDIC SURGERY

## 2020-08-06 PROCEDURE — 3008F BODY MASS INDEX DOCD: CPT | Mod: CPTII,S$GLB,, | Performed by: ORTHOPAEDIC SURGERY

## 2020-08-06 PROCEDURE — 99999 PR PBB SHADOW E&M-EST. PATIENT-LVL V: CPT | Mod: PBBFAC,,, | Performed by: NURSE PRACTITIONER

## 2020-08-06 PROCEDURE — 20610 DRAIN/INJ JOINT/BURSA W/O US: CPT | Mod: RT,S$GLB,, | Performed by: ORTHOPAEDIC SURGERY

## 2020-08-06 PROCEDURE — 99999 PR PBB SHADOW E&M-EST. PATIENT-LVL V: ICD-10-PCS | Mod: PBBFAC,,, | Performed by: NURSE PRACTITIONER

## 2020-08-06 PROCEDURE — 99204 PR OFFICE/OUTPT VISIT, NEW, LEVL IV, 45-59 MIN: ICD-10-PCS | Mod: 25,S$GLB,, | Performed by: ORTHOPAEDIC SURGERY

## 2020-08-06 PROCEDURE — 20610 LARGE JOINT ASPIRATION/INJECTION: R KNEE: ICD-10-PCS | Mod: RT,S$GLB,, | Performed by: ORTHOPAEDIC SURGERY

## 2020-08-06 PROCEDURE — 99999 PR PBB SHADOW E&M-EST. PATIENT-LVL V: CPT | Mod: PBBFAC,,, | Performed by: ORTHOPAEDIC SURGERY

## 2020-08-06 PROCEDURE — 3008F PR BODY MASS INDEX (BMI) DOCUMENTED: ICD-10-PCS | Mod: CPTII,S$GLB,, | Performed by: NURSE PRACTITIONER

## 2020-08-06 PROCEDURE — 99214 OFFICE O/P EST MOD 30 MIN: CPT | Mod: S$GLB,,, | Performed by: NURSE PRACTITIONER

## 2020-08-06 PROCEDURE — 99214 PR OFFICE/OUTPT VISIT, EST, LEVL IV, 30-39 MIN: ICD-10-PCS | Mod: S$GLB,,, | Performed by: NURSE PRACTITIONER

## 2020-08-06 PROCEDURE — 99204 OFFICE O/P NEW MOD 45 MIN: CPT | Mod: 25,S$GLB,, | Performed by: ORTHOPAEDIC SURGERY

## 2020-08-06 PROCEDURE — 3008F PR BODY MASS INDEX (BMI) DOCUMENTED: ICD-10-PCS | Mod: CPTII,S$GLB,, | Performed by: ORTHOPAEDIC SURGERY

## 2020-08-06 PROCEDURE — 3008F BODY MASS INDEX DOCD: CPT | Mod: CPTII,S$GLB,, | Performed by: NURSE PRACTITIONER

## 2020-08-06 RX ORDER — MELOXICAM 15 MG/1
15 TABLET ORAL DAILY PRN
Qty: 14 TABLET | Refills: 0 | Status: SHIPPED | OUTPATIENT
Start: 2020-08-06 | End: 2020-09-21 | Stop reason: CLARIF

## 2020-08-06 NOTE — LETTER
August 8, 2020      Matteo Main, NP  [address]  [facility] [address]           [facility]  [address]  [facility] [place] [address]  Phone: [phone]          Patient: Solomon Busby Jr.   MR Number: [id number]   YOB: 1985   Date of Visit: 8/6/2020       Dear Matteo Main:    Thank you for referring Solomon Busby to me for evaluation. Attached you will find relevant portions of my assessment and plan of care.    If you have questions, please do not hesitate to call me. I look forward to following Solomon Busby along with you.    Sincerely,    Ines Marina MD    Enclosure  CC:  No Recipients    If you would like to receive this communication electronically, please contact [email or web address] or [phone] to request more information on "[employer] Link access.    For providers and/or their staff who would like to refer a patient to [facility], please contact us through our one-stop-shop provider referral line, [facility], at [phone].    If you feel you have received this communication in error or would no longer like to receive these types of communications, please e-mail [email or web address]

## 2020-08-06 NOTE — PROGRESS NOTES
Chief Complaint   Patient presents with    Right Knee - Injury, Pain, Swelling       This patient was seen in consultation at the request of Matteo Main     Initial visit ( 08/06/2020 ): Solomon Busby Jr. is a 34 y.o. male who presents today complaining of Injury, Pain, and Swelling of the Right Knee     Duration of symptoms:  2 days  Trauma or new activity: yes - was playing basketball, felt a pop when doing a pivot on a planted foot.  Was not able to bear weight and presented to the ER. XR negative. Has been in [facility] on crutches. Is able to put a small amount of weight on the knee. Pain with ROM   Pain is constant  Radicular symptoms: no numbness, paresthesias   Associated symptoms:  swelling.    Prior treatment:  None  Pain does interfere with activities of daily living .    This is the extent of the patient's complaints at this time.     Occupation: Works on the [employer]    Review of Systems   All other systems reviewed and are negative.        Review of patient's allergies indicates:  No Known Allergies      Current Outpatient Medications:     acetaminophen-codeine 300-30mg (TYLENOL #3) 300-30 mg Tab, , Disp: , Rfl:     albuterol-ipratropium (DUO-NEB) 2.5 mg-0.5 mg/3 mL nebulizer solution, Take 3 mLs by nebulization every 6 (six) hours as needed for Wheezing or Shortness of Breath., Disp: 100 vial, Rfl: 1    cyclobenzaprine (FLEXERIL) 10 MG tablet, Take 1 tablet (10 mg total) by mouth 3 (three) times daily as needed for Muscle spasms., Disp: 30 tablet, Rfl: 0    fluticasone propionate (FLONASE) 50 mcg/actuation nasal spray, 2 sprays (100 mcg total) by Each Nostril route once daily., Disp: 15 g, Rfl: 0    HYDROcodone-acetaminophen (NORCO) 5-325 mg per tablet, Take 1 tablet by mouth every 4 (four) hours as needed., Disp: 18 tablet, Rfl: 0    ibuprofen (ADVIL,MOTRIN) 800 MG tablet, Take 1 tablet (800 mg total) by mouth 3 (three) times daily., Disp: 30 tablet, Rfl: 0    loratadine (CLARITIN) 10 mg tablet,  TAKE 1 TABLET (10 MG TOTAL) BY MOUTH DAILY AS NEEDED FOR ALLERGIES (OR RUNNY NOSE)., Disp: 30 tablet, Rfl: 3    loratadine (CLARITIN) 10 mg tablet, Take 1 tablet (10 mg total) by mouth daily as needed for Allergies (or runny nose)., Disp: 30 tablet, Rfl: 0    meloxicam (MOBIC) 15 MG tablet, Take 1 tablet (15 mg total) by mouth daily as needed for Pain., Disp: 14 tablet, Rfl: 0    methylPREDNISolone (MEDROL DOSEPACK) 4 mg tablet, use as directed, Disp: 1 Package, Rfl: 0    oxyCODONE-acetaminophen (PERCOCET) 5-325 mg per tablet, Take 1 tablet by mouth every 4 (four) hours as needed., Disp: 10 tablet, Rfl: 0    QVAR REDIHALER 40 mcg/actuation HFAB, INHALE 1 PUFF BY MOUTH INTO LUNGS TWICE DAILY, Disp: 10.6 g, Rfl: 2    VENTOLIN HFA 90 mcg/actuation inhaler, INHALE 2 PUFFS BY MOUTH EVERY 6 HOURS AS NEEDED FOR WHEEZING, Disp: 18 Inhaler, Rfl: 2    azelastine (ASTELIN) 137 mcg (0.1 %) nasal spray, 2 sprays (274 mcg total) by Nasal route 2 (two) times daily. (Patient not taking: Reported on 8/6/2020), Disp: 30 mL, Rfl: 0    levocetirizine (XYZAL) 5 MG tablet, Take 1 tablet (5 mg total) by mouth every evening., Disp: 30 tablet, Rfl: 11    triamcinolone acetonide 0.1% (KENALOG) 0.1 % ointment, Apply topically 2 (two) times daily. for 10 days, Disp: 30 g, Rfl: 0    Past Medical History:   Diagnosis Date    Asthma     Headache     Hernia        Patient Active Problem List   Diagnosis    Reactive airway disease    Acute URI       Past Surgical History:   Procedure Laterality Date    HERNIA REPAIR         Social History     Tobacco Use    Smoking status: Never Smoker    Smokeless tobacco: Never Used   Substance Use Topics    Alcohol use: No    Drug use: No       Family History   Problem Relation Age of Onset    Diabetes Mother     Hypertension Mother     Carpal tunnel syndrome Father     Liver disease Father     No Known Problems Sister     No Known Problems Brother     Allergies Daughter     Asthma  "Daughter     No Known Problems Son     No Known Problems Sister     No Known Problems Sister     No Known Problems Brother     No Known Problems Brother        Physical Exam:   Vitals:    08/06/20 1333   BP: 132/78   Pulse: 82   Resp: 17   Temp: 98.3 °F (36.8 °C)   TempSrc: Oral   SpO2: 97%   Weight: 79.4 kg (175 lb)   Height: 5' 9" (1.753 m)   PainSc: 10-Worst pain ever   PainLoc: Knee       General: Weight: 79.4 kg (175 lb) Body mass index is 25.84 kg/m².   Patient is alert, awake and oriented to time, place and person. Mood and affect are appropriate.  Patient does not appear to be in any distress, denies any constitutional symptoms and appears stated age.   HEENT:  Pupils are equal and round, sclera are not injected. External examination of ears and nose reveals no abnormalities. Cranial nerves II-X are grossly intact  Skin:  no rashes, abrasions or open wounds on the affected extremity   Resp:  No respiratory distress or audible wheezing   CV: 2+  pulses, all extremities warm and well perfused  Bilateral KNEE EXAMINATION        OBSERVATION / INSPECTION   Gait:                            antaglic  Alignment:                  neutral  Scars:                         none  Muscle atrophy:          none  Effusion:                     severe  Warmth:                      none   Discoloration:              none      TENDERNESS                                                                                                                               Patella                         -                       Lateral joint line                       -   Peripatellar medial      -                       Medial joint line                       -       Peripatellar lateral       -                       Medial plica                             -             Patellar tendon            -                       Popliteal fossa                        -             Quad tendon               -                       Gastrocnemius "                        -  Prepatellar Bursa        -                       Quadricep                               -  Tibial tubercle              -                      Thigh/hamstring                      -  Pes anserine/HS         -                       Fibula                                      -  ITB                               -                      Tibia                                        -  Tib/fib joint                   -                      LCL                                          -                         MFC                            -                       MCL:    Proximal                      -                         LFC                             -                                   Distal                           -                                                                ROM:                          PASSIVE                                                 ACTIVE   Left :                            0 / 150                                0 / 150    Right :                          10 / 70                                10 / 80      PATELLOFEMORAL EXAMINATION:  See above noted areas of tenderness.   Patella position    Subluxation / dislocation:         Normal   Sup / Inf;                                  Normal   Crepitus (PF):                                      Absent   Patellar Mobility:                                               Medial-lateral:                         Normal  Superior-inferior:                     Normal   Inferior tilt                                Normal  Patellar tendon:                       Normal  Lateral tilt:                               Normal  J-sign:                                      Normal  Patellofemoral grind:               non painful         MENISCAL SIGNS:                              Pain on terminal extension:                yes  Pain on terminal flexion:                     yes  Ericks maneuver:                       painful  bilaterally, worse medial   Squat                                                  not tested due to pain      LIGAMENT EXAMINATION:  ACL / Lachman:         1+   PCL-Post.  drawer:   normal (0 - 2 mm)  MCL- Valgus:             normal (0 - 2 mm)  LCL- Varus:  normal (0 - 2 mm)  Pivot shift:     unable to test due to pain, guarding   Dial Test: difference c/w other side  At 30° flexion:  normal (< 5°)   At 90° flexion:  normal (< 5°)   Reverse Pivot Shift:    normal (Equal)      STRENGTH:   (* = with pain)           PAINFUL SIDE  Quadricep                               5/5  Hamstrin/5     EXTREMITY NEURO-VASCULAR EXAMINATION:     Sensation:  Grossly intact to light touch all dermatomal regions.   Motor Function:  Fully intact motor function at hip, knee, foot and ankle    DTRs;  quadriceps and  achilles 2+.    Vascular status:  DP and PT pulses 2+, brisk capillary refill, symmetric.          Imaging: 3 views right knee: no fractures. Mod effusion    I personally reviewed and interpreted the patient's imaging obtained prior to visit      Assessment: 34 y.o. male with right knee hemarthrosis, likely ACL tear     I explained my diagnostic impression and the reasoning behind it in detail, using layman's terms.  Models and/or pictures were used to help in the explanation.      Plan:   - Knee aspiration performed for comfort, see procedure note for details.   - MRI R knee  - RTC after MRI complete     All questions were answered in detail. The patient is in full agreement with the treatment plan and will proceed accordingly.    A note notifying Matteo Main of my findings was sent via the electronic medical record     This note was created by combination of typed  and M-Modal dictation. Transcription and phonetic errors may be present.  If there are any questions, please contact me.

## 2020-08-06 NOTE — PROGRESS NOTES
Routine Office Visit    Patient Name: Solomon Busby Jr.    : 1985  MRN: [id number]    Chief Complaint:  Knee pain    Subjective:  Solomon is a 34 y.o. male who presents today for:    1.  Knee pain - patient reports today for evaluation of right knee pain.  Approximately 2 days ago was playing basketball and planted his right foot on the ground.  He states that he then felt a pop in his right knee and then fell.  Since then he has been having significant pain and swelling to the right knee since the event.  He went to the ER 2 days ago and had x-rays which were negative for fractures or dislocations.  He has significant pain on the upper medial aspect of the knee as well as the posterior aspect of the knee.He states that he has pain when walking, but he can bear weight slightly on the right leg. He has pain in the knee when flexing and extending the knee as well, but moreso with extension. Denies any sensation of the knee locking or popping since the accident.  He states that he actually went to an orthopedic doctor yesterday, however he was very dissatisfied with the appointment and is requesting referral to another orthopedist.  He has been taking Percocet for the pain, however this is not help very much.    Past Medical History  Past Medical History:   Diagnosis Date    Asthma     Headache     Hernia        Past Surgical History  Past Surgical History:   Procedure Laterality Date    HERNIA REPAIR         Family History  Family History   Problem Relation Age of Onset    Diabetes Mother     Hypertension Mother     Carpal tunnel syndrome Father     Liver disease Father     No Known Problems Sister     No Known Problems Brother     Allergies Daughter     Asthma Daughter     No Known Problems Son     No Known Problems Sister     No Known Problems Sister     No Known Problems Brother     No Known Problems Brother        Social History  Social History     Socioeconomic History    Marital status:       Spouse name: Not on file    Number of children: Not on file    Years of education: Not on file    Highest education level: Not on file   Occupational History    Not on file   Social Needs    Financial resource strain: Not on file    Food insecurity     Worry: Not on file     Inability: Not on file    Transportation needs     Medical: Not on file     Non-medical: Not on file   Tobacco Use    Smoking status: Never Smoker    Smokeless tobacco: Never Used   Substance and Sexual Activity    Alcohol use: No    Drug use: No    Sexual activity: Yes     Partners: Female   Lifestyle    Physical activity     Days per week: Not on file     Minutes per session: Not on file    Stress: Not on file   Relationships    Social connections     Talks on phone: Not on file     Gets together: Not on file     Attends Restoration service: Not on file     Active member of club or organization: Not on file     Attends meetings of clubs or organizations: Not on file     Relationship status: Not on file   Other Topics Concern    Not on file   Social History Narrative    Not on file       Current Medications  Current Outpatient Medications on File Prior to Visit   Medication Sig Dispense Refill    albuterol-ipratropium (DUO-NEB) 2.5 mg-0.5 mg/3 mL nebulizer solution Take 3 mLs by nebulization every 6 (six) hours as needed for Wheezing or Shortness of Breath. 100 vial 1    cyclobenzaprine (FLEXERIL) 10 MG tablet Take 1 tablet (10 mg total) by mouth 3 (three) times daily as needed for Muscle spasms. 30 tablet 0    fluticasone propionate (FLONASE) 50 mcg/actuation nasal spray 2 sprays (100 mcg total) by Each Nostril route once daily. 15 g 0    oxyCODONE-acetaminophen (PERCOCET) 5-325 mg per tablet Take 1 tablet by mouth every 4 (four) hours as needed. 10 tablet 0    QVAR REDIHALER 40 mcg/actuation HFAB INHALE 1 PUFF BY MOUTH INTO LUNGS TWICE DAILY 10.6 g 2    VENTOLIN HFA 90 mcg/actuation inhaler INHALE 2 PUFFS BY  MOUTH EVERY 6 HOURS AS NEEDED FOR WHEEZING 18 Inhaler 2    acetaminophen-codeine 300-30mg (TYLENOL #3) 300-30 mg Tab       azelastine (ASTELIN) 137 mcg (0.1 %) nasal spray 2 sprays (274 mcg total) by Nasal route 2 (two) times daily. (Patient not taking: Reported on 8/6/2020) 30 mL 0    HYDROcodone-acetaminophen (NORCO) 5-325 mg per tablet Take 1 tablet by mouth every 4 (four) hours as needed. (Patient not taking: Reported on 3/13/2020) 18 tablet 0    ibuprofen (ADVIL,MOTRIN) 800 MG tablet Take 1 tablet (800 mg total) by mouth 3 (three) times daily. (Patient not taking: Reported on 3/13/2020) 30 tablet 0    levocetirizine (XYZAL) 5 MG tablet Take 1 tablet (5 mg total) by mouth every evening. 30 tablet 11    loratadine (CLARITIN) 10 mg tablet TAKE 1 TABLET (10 MG TOTAL) BY MOUTH DAILY AS NEEDED FOR ALLERGIES (OR RUNNY NOSE). (Patient not taking: Reported on 3/13/2020) 30 tablet 3    loratadine (CLARITIN) 10 mg tablet Take 1 tablet (10 mg total) by mouth daily as needed for Allergies (or runny nose). (Patient not taking: Reported on 8/6/2020) 30 tablet 0    methylPREDNISolone (MEDROL DOSEPACK) 4 mg tablet use as directed (Patient not taking: Reported on 3/13/2020) 1 Package 0    triamcinolone acetonide 0.1% (KENALOG) 0.1 % ointment Apply topically 2 (two) times daily. for 10 days 30 g 0    [DISCONTINUED] naproxen (NAPROSYN) 500 MG tablet TAKE 1 TABLET BY MOUTH TWICE A DAY AS NEEDED (Patient not taking: Reported on 3/13/2020) 30 tablet 0     No current facility-administered medications on file prior to visit.        Allergies   Review of patient's allergies indicates:  No Known Allergies    Review of Systems (Pertinent positives)  Review of Systems   Constitutional: Negative.    HENT: Negative.    Eyes: Negative.    Respiratory: Negative.    Cardiovascular: Negative.    Gastrointestinal: Negative.    Genitourinary: Negative.    Musculoskeletal: Positive for joint pain and myalgias. Negative for back pain,  "falls and neck pain.   Skin: Negative.    Neurological: Negative.    Psychiatric/Behavioral: Negative.        /82 (BP Location: Right arm, Patient Position: Sitting, BP Method: Large (Manual))   Pulse 73   Temp 98.4 °F (36.9 °C) (Oral)   Resp 18   Ht 5' 9" (1.753 m)   SpO2 97%   BMI 25.84 kg/m²     Physical Exam  Vitals signs reviewed.   Constitutional:       General: He is not in acute distress.     Appearance: Normal appearance. He is not ill-appearing, toxic-appearing or diaphoretic.   HENT:      Head: Normocephalic and atraumatic.   Neck:      Musculoskeletal: Normal range of motion and neck supple.   Cardiovascular:      Rate and Rhythm: Normal rate and regular rhythm.      Pulses: Normal pulses.      Heart sounds: Normal heart sounds. No murmur. No friction rub. No gallop.    Pulmonary:      Effort: Pulmonary effort is normal. No respiratory distress.      Breath sounds: Normal breath sounds. No stridor. No wheezing, rhonchi or rales.   Chest:      Chest wall: No tenderness.   Musculoskeletal:      Right knee: He exhibits decreased range of motion, swelling and bony tenderness. He exhibits no effusion, no ecchymosis, no deformity, no laceration, no erythema and normal alignment. Tenderness found. MCL and LCL tenderness noted. No medial joint line, no lateral joint line and no patellar tendon tenderness noted.      Comments: Right knee - swelling noted; patient unable to fully flex/extend right knee due to swelling; no sag sign noted no laxity with anterior or posterior drawer test; no overt laxity of the MCL or LCL noted, however patient does report tenderness with varus/valgus testing   Skin:     General: Skin is warm and dry.      Capillary Refill: Capillary refill takes less than 2 seconds.   Neurological:      General: No focal deficit present.      Mental Status: He is alert and oriented to person, place, and time.          Assessment/Plan:  Solomon Busby Jr. is a 34 y.o. male who presents " today for :    Solomon was seen today for hospital follow up and knee pain.    Diagnoses and all orders for this visit:    Injury of right knee, initial encounter  -     meloxicam (MOBIC) 15 MG tablet; Take 1 tablet (15 mg total) by mouth daily as needed for Pain.  -     Ambulatory referral/consult to Orthopedics; Future     Patient with significant swelling and tenderness of the knee after sports related injury.  No overt ligament laxity noted, however examination may be distorted because of the swelling.  Reviewed x-rays from the emergency room which showed no fractures or dislocations.  He has a large leg brace on today.  I encouraged him to continue wearing this.  Will send Mobic which patient can use as needed for pain. Patient was educated regarding the side effects of NSAIDs, including GI ulceration, kidney dysfunction, GI bleeding, and stomach upset.  Patient was instructed to stop the medication and call the clinic if the patient experiences any stomach upset, black tarry stools, bloody stools, or vomiting while taking this medication.  Will refer to orthopedics again for further evaluation due to potential for ligament damage after injury.  Patient verbalized understanding of instructions.         This office note has been dictated.  This dictation has been generated using M-Modal Fluency Direct dictation; some phonetic errors may occur.   My collaborating physician is Dr. Ar Downing.

## 2020-08-07 ENCOUNTER — HOSPITAL ENCOUNTER (OUTPATIENT)
Dept: RADIOLOGY | Facility: HOSPITAL | Age: 35
Discharge: HOME | End: 2020-08-07
Attending: ORTHOPAEDIC SURGERY
Payer: COMMERCIAL

## 2020-08-07 DIAGNOSIS — M25.561 ACUTE PAIN OF RIGHT KNEE: ICD-10-CM

## 2020-08-07 PROCEDURE — 73721 MRI JNT OF LWR EXTRE W/O DYE: CPT | Mod: TC,PO,RT

## 2020-08-07 PROCEDURE — 73721 MRI JNT OF LWR EXTRE W/O DYE: CPT | Mod: 26,RT,, | Performed by: RADIOLOGY

## 2020-08-07 PROCEDURE — 73721 MRI KNEE WITHOUT CONTRAST RIGHT: ICD-10-PCS | Mod: 26,RT,, | Performed by: RADIOLOGY

## 2020-08-08 NOTE — PROCEDURES
Large Joint Aspiration/Injection: R knee    Date/Time: 8/6/2020 1:30 PM  Performed by: Ines Marina MD  Authorized by: Ines Marina MD     Consent Done?:  Yes (Verbal)  Indications:  Diagnostic evaluation and pain  Timeout: prior to procedure the correct patient, procedure, and site was verified    Prep: patient was prepped and draped in usual sterile fashion      Local anesthesia used?: Yes    Local anesthetic:  Topical anesthetic    Details:  Needle Size:  22 G  Approach:  Superior  Location:  Knee  Site:  R knee  Aspirate amount (mL):  45  Aspirate:  Bloody  Patient tolerance:  Patient tolerated the procedure well with no immediate complications

## 2020-08-10 ENCOUNTER — OFFICE VISIT (OUTPATIENT)
Dept: ORTHOPEDICS | Facility: CLINIC | Age: 35
End: 2020-08-10
Payer: COMMERCIAL

## 2020-08-10 VITALS
BODY MASS INDEX: 25.92 KG/M2 | HEART RATE: 71 BPM | DIASTOLIC BLOOD PRESSURE: 80 MMHG | HEIGHT: 69 IN | SYSTOLIC BLOOD PRESSURE: 132 MMHG | WEIGHT: 175 LBS | TEMPERATURE: 99 F | OXYGEN SATURATION: 98 % | RESPIRATION RATE: 17 BRPM

## 2020-08-10 DIAGNOSIS — S83.511A RUPTURE OF ANTERIOR CRUCIATE LIGAMENT OF RIGHT KNEE, INITIAL ENCOUNTER: Primary | ICD-10-CM

## 2020-08-10 DIAGNOSIS — S83.241A ACUTE MEDIAL MENISCUS TEAR OF RIGHT KNEE, INITIAL ENCOUNTER: ICD-10-CM

## 2020-08-10 PROCEDURE — 3008F PR BODY MASS INDEX (BMI) DOCUMENTED: ICD-10-PCS | Mod: CPTII,S$GLB,, | Performed by: ORTHOPAEDIC SURGERY

## 2020-08-10 PROCEDURE — 99212 OFFICE O/P EST SF 10 MIN: CPT | Mod: S$GLB,,, | Performed by: ORTHOPAEDIC SURGERY

## 2020-08-10 PROCEDURE — 99999 PR PBB SHADOW E&M-EST. PATIENT-LVL V: CPT | Mod: PBBFAC,,, | Performed by: ORTHOPAEDIC SURGERY

## 2020-08-10 PROCEDURE — 99212 PR OFFICE/OUTPT VISIT, EST, LEVL II, 10-19 MIN: ICD-10-PCS | Mod: S$GLB,,, | Performed by: ORTHOPAEDIC SURGERY

## 2020-08-10 PROCEDURE — 3008F BODY MASS INDEX DOCD: CPT | Mod: CPTII,S$GLB,, | Performed by: ORTHOPAEDIC SURGERY

## 2020-08-10 PROCEDURE — 99999 PR PBB SHADOW E&M-EST. PATIENT-LVL V: ICD-10-PCS | Mod: PBBFAC,,, | Performed by: ORTHOPAEDIC SURGERY

## 2020-08-10 NOTE — PROGRESS NOTES
Reviewed MRI   Motion not improved     Exam:   PROM 30-90  Mod effusion       Discussed op and non op treatment options  Discussed risks and benefits of surgery and graft options     Will need prehab - ROM is extremely limited     Will re-eval in 2 weeks to check motion and discuss further treatment  
no

## 2020-08-11 ENCOUNTER — CLINICAL SUPPORT (OUTPATIENT)
Dept: REHABILITATION | Facility: HOSPITAL | Age: 35
End: 2020-08-11
Attending: ORTHOPAEDIC SURGERY
Payer: COMMERCIAL

## 2020-08-11 DIAGNOSIS — S83.511A RUPTURE OF ANTERIOR CRUCIATE LIGAMENT OF RIGHT KNEE, INITIAL ENCOUNTER: ICD-10-CM

## 2020-08-11 DIAGNOSIS — R68.89 DIFFICULTY PARTICIPATING IN SPORTING ACTIVITIES: ICD-10-CM

## 2020-08-11 DIAGNOSIS — M25.469 SWELLING OF KNEE: ICD-10-CM

## 2020-08-11 DIAGNOSIS — R26.2 DIFFICULTY IN WALKING: ICD-10-CM

## 2020-08-11 DIAGNOSIS — M25.661 DECREASED RANGE OF MOTION (ROM) OF RIGHT KNEE: ICD-10-CM

## 2020-08-11 DIAGNOSIS — M25.561 ACUTE PAIN OF RIGHT KNEE: ICD-10-CM

## 2020-08-11 DIAGNOSIS — S83.241A ACUTE MEDIAL MENISCUS TEAR OF RIGHT KNEE, INITIAL ENCOUNTER: ICD-10-CM

## 2020-08-11 DIAGNOSIS — M62.81 QUADRICEPS WEAKNESS: ICD-10-CM

## 2020-08-11 PROCEDURE — 97161 PT EVAL LOW COMPLEX 20 MIN: CPT | Mod: PN

## 2020-08-11 PROCEDURE — 97110 THERAPEUTIC EXERCISES: CPT | Mod: PN

## 2020-08-12 NOTE — PLAN OF CARE
[facility]  Physical Therapy Initial Evaluation    Date: 8/11/2020   Name: Solomon Busby Jr.  Clinic Number: [id number]    Therapy Diagnosis:   Encounter Diagnoses   Name Primary?    Rupture of anterior cruciate ligament of right knee, initial encounter     Acute medial meniscus tear of right knee, initial encounter     Quadriceps weakness     Decreased range of motion (ROM) of right knee     Acute pain of right knee     Difficulty in walking     Difficulty participating in sporting activities     Swelling of knee      Physician: Ines Marina MD    Physician Orders: PT Eval and Treat   Medical Diagnosis from Referral: S89.91XA (ICD-10-CM) - Injury of right knee, initial encounter     Evaluation Date: 8/11/2020  Authorization Period Expiration: 9/11/20  Plan of Care Expiration: 11/3/20  Visit # / Visits authorized: 1/ 20    Time In: 3:00  Time Out: 3:50  Total Appointment Time (timed & untimed codes): 50 minutes    Precautions: Standard    Subjective   Date of onset: 8/4/20  History of current condition - Solomon reports: Reports injury to R knee when pivoting playing basketball. He felt a pop in his knee. He has a confirmed ACL and meniscus tear on MRI and is here today for prehab to work on knee ROM prior to surgery. Surgery date not set currently.     Medical History:   Past Medical History:   Diagnosis Date    Asthma     Headache     Hernia        Surgical History:   Solomon Busby Jr.  has a past surgical history that includes Hernia repair.    Medications:   Solomon has a current medication list which includes the following prescription(s): acetaminophen-codeine 300-30mg, albuterol-ipratropium, azelastine, cyclobenzaprine, fluticasone propionate, hydrocodone-acetaminophen, ibuprofen, levocetirizine, loratadine, loratadine, meloxicam, methylprednisolone, oxycodone-acetaminophen, qvar redihaler, triamcinolone acetonide 0.1%, and ventolin hfa.    Allergies:   Review of  patient's allergies indicates:  No Known Allergies     Imaging, MRI studies:    Impression:     Torn ACL.     Nondisplaced medial and lateral meniscal tears, as above.     Moderate sized osseous contusion of the lateral femoral condyle.     Grade I/II sprain at the MPFL-VMO complex.     Moderate-sized joint effusion.      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   Prior Level of Function: unrestricted ADL's, able to work   Current Level of Function: unable to work, abnormal gait, requires assistance with donning/doffing socks and shoes    Pain:  Current 5/10, worst 10/10, best 5/10   Location: right knee   Description: Aching  Aggravating Factors: walking, movement during sleep  Easing Factors: ice    Patients goals: decrease pain, improve function     Objective       Observation: Wearing T scope brace on R    Gait: Ambulates with decreased TKE throughout midstance.     Range of Motion:   Knee Left active Right Active   Flexion 130 75 ( 90 AAROM)   Extension +2 18         Lower Extremity Strength  Right LE  Left LE    Knee extension: 3/5 Knee extension: 5/5   Knee flexion: 3/5 Knee flexion: 5/5           Special Tests: Not performed secondary to confirmed ACL tear on MRI       Joint Mobility: R  Patella hypomobile all planes     Palpation: TTP R medial joint line    Sensation: SILT B LE    Flexibility: Moderate decrease in R hamstring flexibility    Edema: see below     Girth Measurement Joint line 5 cm below 10 cm above   Left 36 cm 33 cm 45 cm   Right 40 cm 30.5 cm 46 cm         Limitation/Restriction for FOTO Knee  Survey    Therapist reviewed FOTO scores for Solomon Busby Jr. on 8/11/2020.   FOTO documents entered into [employer] - see Media section.    Limitation Score: 53%         TREATMENT   Treatment Time In: 3:15 pm  Treatment Time Out: 3:50 pm   Total Treatment time (time-based codes)  separate from Evaluation: 25 minutes    Solomon received therapeutic exercises to develop strength and ROM for 25 minutes including:  Heel slides 20 x 10 sec  Heel prop x 3 min  Prone hang x 3 min  Quad sets 30 x 5 sec (towel under knee)  Sidelying hip abduction 2 x 10 R  Seated AAROM knee flexion/ext 20 x ea    Solomon received the following manual therapy techniques: Joint mobilizations were applied to the: R knee for 0 minutes, including:      Solomon participated in neuromuscular re-education activities to improve: Balance for 0 minutes. The following activities were included:      Solomon participated in dynamic functional therapeutic activities to improve functional performance for 0  minutes, including:      Solomon participated in gait training to improve functional mobility and safety for 0  minutes, including:      Solomon received the following supervised modalities after being cleared for contradictions: Not performed      Solomon received cold pack for 10 minutes to R knee.    Home Exercises and Patient Education Provided    Education provided:   - role of PT, plan of care, involved anatomy and pathology,  goals, rational for treatment and exercise, scheduling limitations      Written Home Exercises Provided: Patient instructed to cont prior HEP.  Exercises were reviewed and Solomon was able to demonstrate them prior to the end of the session.  Solomon demonstrated good  understanding of the education provided.     See EMR under Patient Instructions for exercises provided 8/11/2020.    Assessment   Solomon is a 34 y.o. male referred to outpatient Physical Therapy with a medical diagnosis of  Injury of right knee, initial encounter . Patient presents with decreased R in R knee, edema, and decreased knee strength. Ambulates wearing Tscope brace and displays decreased TKE throughout midstance. He is here today to work on his ROM and pt/MD will discuss surgical vs non surgical options at next MD visit. Pt would  benefit from skilled PT services in order to address listed deficits, improve tolerance to activities, establish HEP, and decrease pain to maximize quality of life. Pt is motivated to participate in therapy and is in agreement with POC.  Patient prognosis is Good.   Patientt will benefit from skilled outpatient Physical Therapy to address the deficits stated above and in the chart below, provide patient /family education, and to maximize patientt's level of independence.     Plan of care discussed with patient: Yes  Patient's spiritual, cultural and educational needs considered and patient is agreeable to the plan of care and goals as stated below:     Anticipated Barriers for therapy: none    Medical Necessity is demonstrated by the following  History  Co-morbidities and personal factors that may impact the plan of care Co-morbidities:   none    Personal Factors:   no deficits     low   Examination  Body Structures and Functions, activity limitations and participation restrictions that may impact the plan of care Body Regions:   lower extremities    Body Systems:    gross symmetry  ROM  strength  gross coordinated movement  balance  gait  transfers  transitions  motor control    Participation Restrictions:   Walking, sports, work    Activity limitations:   Learning and applying knowledge  no deficits    General Tasks and Commands  no deficits    Communication  no deficits    Mobility  lifting and carrying objects  walking    Self care  dressing    Domestic Life  doing house work (cleaning house, washing dishes, laundry)    Interactions/Relationships  no deficits    Life Areas  employment    Community and Social Life  community life  recreation and leisure         high   Clinical Presentation stable and uncomplicated low   Decision Making/ Complexity Score: low     Goals:  Short Term Goals: 4 weeks   1. The patient with report compliance with HEP to maximize functional outcomes.  2. The patient will demonstrate R  knee AROM >/= 110 degrees flexion 0 degrees extension to improve quality of gait pattern.  3. The patient will report </= 43 % functional limitation on FOTO to indicate an improvement in overall function.  4. The patient will report current pain </= 3/10 to improve quality of sleep.     Long Term Goals: 8 weeks   1. The patient will demonstrate understanding and performance of advanced HEP to allow for independence once discharged from therapy.   2.  The patient will demonstrate R knee AROM >/= 120 flexion to improve ease of stair climbing.   3. The patient will report </= 35 % functional limitation on FOTO to indicate an improvement in overall function.  4. The patient will demonstrate R knee flexion and extension MMT >/= 4/5 to allow for increased ease of squatting during ADL's and work activities.       Plan   Plan of care Certification: 8/11/2020 to 11/3/20    Outpatient Physical Therapy 2 times weekly for 8 weeks to include the following interventions: Electrical Stimulation IFC/TENS/Russian, Gait Training, Manual Therapy, Moist Heat/ Ice, Neuromuscular Re-ed, Patient Education, Self Care, Therapeutic Activites and Therapeutic Exercise.     LEANDER SILVA, PT

## 2020-08-14 DIAGNOSIS — Z11.59 NEED FOR HEPATITIS C SCREENING TEST: ICD-10-CM

## 2020-08-18 ENCOUNTER — CLINICAL SUPPORT (OUTPATIENT)
Dept: REHABILITATION | Facility: HOSPITAL | Age: 35
End: 2020-08-18
Attending: ORTHOPAEDIC SURGERY
Payer: COMMERCIAL

## 2020-08-18 DIAGNOSIS — M25.661 DECREASED RANGE OF MOTION (ROM) OF RIGHT KNEE: ICD-10-CM

## 2020-08-18 DIAGNOSIS — M25.561 ACUTE PAIN OF RIGHT KNEE: ICD-10-CM

## 2020-08-18 DIAGNOSIS — M25.469 SWELLING OF KNEE: ICD-10-CM

## 2020-08-18 DIAGNOSIS — R26.2 DIFFICULTY IN WALKING: ICD-10-CM

## 2020-08-18 DIAGNOSIS — M62.81 QUADRICEPS WEAKNESS: ICD-10-CM

## 2020-08-18 DIAGNOSIS — R68.89 DIFFICULTY PARTICIPATING IN SPORTING ACTIVITIES: ICD-10-CM

## 2020-08-18 PROCEDURE — 97112 NEUROMUSCULAR REEDUCATION: CPT | Mod: PN

## 2020-08-18 PROCEDURE — 97110 THERAPEUTIC EXERCISES: CPT | Mod: PN

## 2020-08-18 NOTE — PROGRESS NOTES
Physical Therapy Treatment Note     Name: Solomon Busby Jr.  Clinic Number: [id number]    Therapy Diagnosis:   Encounter Diagnoses   Name Primary?    Quadriceps weakness     Decreased range of motion (ROM) of right knee     Acute pain of right knee     Difficulty in walking     Difficulty participating in sporting activities     Swelling of knee      Physician: Ines Marina MD    Visit Date: 8/18/2020    Physician Orders: PT Eval and Treat   Medical Diagnosis from Referral: S89.91XA (ICD-10-CM) - Injury of right knee, initial encounter   Evaluation Date: 8/11/2020  Authorization Period Expiration: 9/11/20  Plan of Care Expiration: 11/3/20  Visit # / Visits authorized: 2/ 20    Time In: 4:30 pm   Time Out: 5:45 pm   Total Billable Time: 54 minutes    Precautions: Standard      Subjective     Pt reports: ROM improving in his knee.  He was compliant with home exercise program.  Response to previous treatment: no adverse reactions  Functional change: Ambulating without crutches.     Pain: 4/10  Location: right knee      Objective     R Knee Hyperext/ext/flx AROM:  0  /5 / 95 degrees   Gait: Ambulates without AD and decreased R TKE throughout midstance and early heel rise. T  Scope brace donned.     Solomon received therapeutic exercises to develop strength, endurance and ROM for 44 minutes including:  Heel prop with self overpressure with gastroc stretch (small maroon bolster under heel, 5# on knee) x 5 min  Quad sets 40 x 5 sec (towel under knee)  Heel slides 20 x 10 sec  SLR flexion 3 x 10  Bridges 3 x 10  Prone hang x 3 min  Sidelying hip abduction 3 x 10 B  Upright bike x 5 minutes   TKE with maroon cook band 5 x 10  Gastroc stretch on wedge 3 x 30 sec  Double leg calf raise 3 x 10      Solomon received the following manual therapy techniques: Joint mobilizations were applied to the: R knee for 0 minutes, including:      Solomon participated in neuromuscular re-education activities to improve: Coordination  for 10 minutes. The following activities were included:  Latvian with quad set x 10 min 10 sec on, 10 sec off (34 mA, 50%, 2 sec ramp up)       Solomon participated in dynamic functional therapeutic activities to improve functional performance for 0  minutes, including:      Solomon participated in gait training to improve functional mobility and safety for 0  minutes, including:      Solomon received the following supervised modalities after being cleared for contradictions: not performed      Solomon received cold pack for 10 minutes to R knee.      Home Exercises Provided and Patient Education Provided     Education provided:   - Continue with HEp    Written Home Exercises Provided: Patient instructed to cont prior HEP.  Exercises were reviewed and Solomon was able to demonstrate them prior to the end of the session.  Solomon demonstrated good  understanding of the education provided.     See EMR under Patient Instructions for exercises provided prior visit.    Assessment   The patient demonstrates improved knee flexion and extension since initial visit. Able to ambulate without crutches but lacks TKE throughout midstance and demonstrates an early heel rise. Latvian with quad set initiated today to further improve neuromuscular re-education/coordination of quad set to improve quality of gait. Overall good tolerance to session but continues to benefit from skilled PT to improve knee ROM/strength to prepare for possible surgery and increase ease of ADL's (walking, squatting, bending).     Solomon is progressing well towards his goals.   Pt prognosis is Good.     Pt will continue to benefit from skilled outpatient physical therapy to address the deficits listed in the problem list box on initial evaluation, provide pt/family education and to maximize pt's level of independence in the home and community environment.     Pt's spiritual, cultural and educational needs considered and pt agreeable to plan of care and goals.       Anticipated Barriers for therapy: none    Goals:  Short Term Goals: 4 weeks   1. The patient with report compliance with HEP to maximize functional outcomes.  2. The patient will demonstrate R knee AROM >/= 110 degrees flexion 0 degrees extension to improve quality of gait pattern.  3. The patient will report </= 43 % functional limitation on FOTO to indicate an improvement in overall function.  4. The patient will report current pain </= 3/10 to improve quality of sleep.      Long Term Goals: 8 weeks   1. The patient will demonstrate understanding and performance of advanced HEP to allow for independence once discharged from therapy.   2.  The patient will demonstrate R knee AROM >/= 120 flexion to improve ease of stair climbing.   3. The patient will report </= 35 % functional limitation on FOTO to indicate an improvement in overall function.  4. The patient will demonstrate R knee flexion and extension MMT >/= 4/5 to allow for increased ease of squatting during ADL's and work activities.       Plan     Continue with skilled PT. Warm up on bike next visit.     LEANDER SILVA, PT

## 2020-08-21 ENCOUNTER — CLINICAL SUPPORT (OUTPATIENT)
Dept: REHABILITATION | Facility: HOSPITAL | Age: 35
End: 2020-08-21
Attending: ORTHOPAEDIC SURGERY
Payer: COMMERCIAL

## 2020-08-21 DIAGNOSIS — M25.561 ACUTE PAIN OF RIGHT KNEE: ICD-10-CM

## 2020-08-21 DIAGNOSIS — M25.661 DECREASED RANGE OF MOTION (ROM) OF RIGHT KNEE: ICD-10-CM

## 2020-08-21 DIAGNOSIS — R26.2 DIFFICULTY IN WALKING: ICD-10-CM

## 2020-08-21 DIAGNOSIS — M25.469 SWELLING OF KNEE: ICD-10-CM

## 2020-08-21 DIAGNOSIS — M62.81 QUADRICEPS WEAKNESS: ICD-10-CM

## 2020-08-21 DIAGNOSIS — R68.89 DIFFICULTY PARTICIPATING IN SPORTING ACTIVITIES: ICD-10-CM

## 2020-08-21 PROCEDURE — 97110 THERAPEUTIC EXERCISES: CPT | Mod: PN

## 2020-08-21 NOTE — PROGRESS NOTES
Physical Therapy Treatment Note     Name: Solomon Busby Jr.  Clinic Number: [id number]    Therapy Diagnosis:   Encounter Diagnoses   Name Primary?    Quadriceps weakness     Decreased range of motion (ROM) of right knee     Acute pain of right knee     Difficulty in walking     Difficulty participating in sporting activities     Swelling of knee      Physician: Ines Marina MD    Visit Date: 8/21/2020    Physician Orders: PT Eval and Treat   Medical Diagnosis from Referral: S89.91XA (ICD-10-CM) - Injury of right knee, initial encounter   Evaluation Date: 8/11/2020  Authorization Period Expiration: 9/11/20  Plan of Care Expiration: 11/3/20  Visit # / Visits authorized: 3/ 20    Time In: 9:00 am  Time Out:  100:00 am  Total Billable Time: 49 minutes    Precautions: Standard      Subjective     Pt reports: doing well today.   He was compliant with home exercise program.  Response to previous treatment: no adverse reactions  Functional change: Ambulating without crutches.     Pain: 4/10  Location: right knee      Objective     R Knee Hyperext/ext/flx AROM:  0  /5 / 100 degrees   Gait: Ambulates without AD and decreased R TKE throughout midstance and early heel rise. T  Scope brace donned.     Solomon received therapeutic exercises (bold exercises performed today) to develop strength, endurance and ROM for 44 minutes including:  Upright bike x 6 minutes (initaited session with 1/2 revolutions)  Heel slides 20 x 10 sec  Quad sets with Heel prop with self overpressure with gastroc stretch 5# on knee, 40x 5 sec  SLR flexion 4 x 10  Sidelying hip abduction 3 x 12 B  Bridges 3 x 10  Prone hang x 4 min, 2#  TKE with maroon cook band 4 x 10  Gastroc stretch on wedge 3 x 30 sec  Double leg calf raise 4 x 10  Hamstring stretch on step 3 x 30 sec  Step lunge stretch 20 x 5 sec  Sit <> stands from weight bench 3 x 10      Solomon received the following manual therapy techniques: Joint mobilizations were applied to the: R  knee for 0 minutes, including:      Solomon participated in neuromuscular re-education activities to improve: Coordination for 00 minutes. The following activities were included:  Lao with quad set x 10 min 10 sec on, 10 sec off (34 mA, 50%, 2 sec ramp up)       Solomon participated in dynamic functional therapeutic activities to improve functional performance for 0  minutes, including:      Solomon participated in gait training to improve functional mobility and safety for 0  minutes, including:      Solomon received the following supervised modalities after being cleared for contradictions: not performed      Solomon received cold pack for 10 minutes to R knee with heel prop.      Home Exercises Provided and Patient Education Provided     Education provided:   - Continue with HEP    Written Home Exercises Provided: Patient instructed to cont prior HEP.  Exercises were reviewed and Solomon was able to demonstrate them prior to the end of the session.  Solomon demonstrated good  understanding of the education provided.     See EMR under Patient Instructions for exercises provided prior visit.    Assessment   The patient demonstrates slight improvement in knee flexion ROM today but still lacking TKE during midstance. Session continues to focus on knee flexion and extension ROM. Weight shifts away from R side during sit to stands from bench but ROM/form improved as reps progressed.     Solomon is progressing well towards his goals.   Pt prognosis is Good.     Pt will continue to benefit from skilled outpatient physical therapy to address the deficits listed in the problem list box on initial evaluation, provide pt/family education and to maximize pt's level of independence in the home and community environment.     Pt's spiritual, cultural and educational needs considered and pt agreeable to plan of care and goals.      Anticipated Barriers for therapy: none    Goals:  Short Term Goals: 4 weeks   1. The patient with  report compliance with HEP to maximize functional outcomes.  2. The patient will demonstrate R knee AROM >/= 110 degrees flexion 0 degrees extension to improve quality of gait pattern.  3. The patient will report </= 43 % functional limitation on FOTO to indicate an improvement in overall function.  4. The patient will report current pain </= 3/10 to improve quality of sleep.      Long Term Goals: 8 weeks   1. The patient will demonstrate understanding and performance of advanced HEP to allow for independence once discharged from therapy.   2.  The patient will demonstrate R knee AROM >/= 120 flexion to improve ease of stair climbing.   3. The patient will report </= 35 % functional limitation on FOTO to indicate an improvement in overall function.  4. The patient will demonstrate R knee flexion and extension MMT >/= 4/5 to allow for increased ease of squatting during ADL's and work activities.       Plan     Continue with skilled PT. Warm up on bike next visit.     LEANDER SILVA, PT

## 2020-08-26 ENCOUNTER — CLINICAL SUPPORT (OUTPATIENT)
Dept: REHABILITATION | Facility: HOSPITAL | Age: 35
End: 2020-08-26
Attending: ORTHOPAEDIC SURGERY
Payer: COMMERCIAL

## 2020-08-26 DIAGNOSIS — R68.89 DIFFICULTY PARTICIPATING IN SPORTING ACTIVITIES: ICD-10-CM

## 2020-08-26 DIAGNOSIS — M25.661 DECREASED RANGE OF MOTION (ROM) OF RIGHT KNEE: ICD-10-CM

## 2020-08-26 DIAGNOSIS — M25.561 ACUTE PAIN OF RIGHT KNEE: ICD-10-CM

## 2020-08-26 DIAGNOSIS — M25.469 SWELLING OF KNEE: ICD-10-CM

## 2020-08-26 DIAGNOSIS — M62.81 QUADRICEPS WEAKNESS: ICD-10-CM

## 2020-08-26 DIAGNOSIS — R26.2 DIFFICULTY IN WALKING: ICD-10-CM

## 2020-08-26 PROCEDURE — 97110 THERAPEUTIC EXERCISES: CPT | Mod: PN

## 2020-08-26 PROCEDURE — 97112 NEUROMUSCULAR REEDUCATION: CPT | Mod: PN

## 2020-08-26 NOTE — PROGRESS NOTES
Physical Therapy Treatment Note     Name: Solomon Busby Jr.  Clinic Number: [id number]    Therapy Diagnosis:   Encounter Diagnoses   Name Primary?    Quadriceps weakness     Decreased range of motion (ROM) of right knee     Acute pain of right knee     Difficulty in walking     Difficulty participating in sporting activities     Swelling of knee      Physician: Ines Marina MD    Visit Date: 8/26/2020    Physician Orders: PT Eval and Treat   Medical Diagnosis from Referral: S89.91XA (ICD-10-CM) - Injury of right knee, initial encounter   Evaluation Date: 8/11/2020  Authorization Period Expiration: 9/11/20  Plan of Care Expiration: 11/3/20  Visit # / Visits authorized: 4/ 20  PN due 9/8/20    Time In:  1:15 pm  Time Out:  2:10 pm  Total Billable Time: 45  minutes    Precautions: Standard      Subjective     Pt reports: no new complaints. He is compliant with HEP.   He was compliant with home exercise program.  Response to previous treatment: no adverse reactions  Functional change: Ambulating without crutches.     Pain: 4/10  Location: right knee      Objective     R Knee Hyperext/ext/flx AROM:  0  /2 / 110 degrees   Gait: Ambulates without AD and decreased R TKE throughout midstance and early heel rise.     Solomon received therapeutic exercises (bold exercises performed today) to develop strength, endurance and ROM for 45 minutes including:  Upright bike x 5 minutes (initaited session with full revolutions)  Heel slides 20 x 10 sec  SLR flexion 3 x 12 1#  Sidelying hip abduction 3 x 12 2# B  Shuttle MVP SL press 3 x 15 2 black bands  SL deadlift on foam 3 x 10 (touching small foam roller for target)   R SLS on foam with 3 direction slider L (ant, lateral, posterior) 8 cycles  Side steps with band (knees) Black T band 3 x 25 ft ea direction   Forward/backward steps with band (knees) Black T band 3 x 25 ft ea direction   Step up to SLS 6 inch box + airex, 3 x 10 with 3 sec SLS hold     Solomon received the  following manual therapy techniques: Joint mobilizations were applied to the: R knee for 0 minutes, including:      Solomon participated in neuromuscular re-education activities to improve: Coordination for 00 minutes. The following activities were included:  Filipino with quad set x 10 min 10 sec on, 10 sec off (34 mA, 50%, 2 sec ramp up)       Solomon participated in dynamic functional therapeutic activities to improve functional performance for 0  minutes, including:      Solomon participated in gait training to improve functional mobility and safety for 0  minutes, including:      Solomon received the following supervised modalities after being cleared for contradictions: not performed      Solomon received cold pack for 10 minutes to R knee with heel prop.      Home Exercises Provided and Patient Education Provided     Education provided:   - Continue with HEP    Written Home Exercises Provided: Patient instructed to cont prior HEP.  Exercises were reviewed and Solomon was able to demonstrate them prior to the end of the session.  Solomon demonstrated good  understanding of the education provided.     See EMR under Patient Instructions for exercises provided prior visit.    Assessment   Demonstrates improved R knee flexion and extension but still lacking TKE during midstance. Neuromuscular control/balance were challenged during SL deadlift on foam, R SLS with opp LE slider, and step up to SLS with airex.Continues to benefit from skilled PT to address ROM and strength deficits to prepare for possible ACL reconstruction.   Solomon is progressing well towards his goals.   Pt prognosis is Good.     Pt will continue to benefit from skilled outpatient physical therapy to address the deficits listed in the problem list box on initial evaluation, provide pt/family education and to maximize pt's level of independence in the home and community environment.     Pt's spiritual, cultural and educational needs considered and pt  agreeable to plan of care and goals.      Anticipated Barriers for therapy: none    Goals:  Short Term Goals: 4 weeks   1. The patient with report compliance with HEP to maximize functional outcomes.  2. The patient will demonstrate R knee AROM >/= 110 degrees flexion 0 degrees extension to improve quality of gait pattern.  3. The patient will report </= 43 % functional limitation on FOTO to indicate an improvement in overall function.  4. The patient will report current pain </= 3/10 to improve quality of sleep.      Long Term Goals: 8 weeks   1. The patient will demonstrate understanding and performance of advanced HEP to allow for independence once discharged from therapy.   2.  The patient will demonstrate R knee AROM >/= 120 flexion to improve ease of stair climbing.   3. The patient will report </= 35 % functional limitation on FOTO to indicate an improvement in overall function.  4. The patient will demonstrate R knee flexion and extension MMT >/= 4/5 to allow for increased ease of squatting during ADL's and work activities.       Plan     Continue with skilled PT.     LEANDER SILVA, PT

## 2020-08-31 ENCOUNTER — CLINICAL SUPPORT (OUTPATIENT)
Dept: REHABILITATION | Facility: HOSPITAL | Age: 35
End: 2020-08-31
Attending: ORTHOPAEDIC SURGERY
Payer: COMMERCIAL

## 2020-08-31 ENCOUNTER — OFFICE VISIT (OUTPATIENT)
Dept: ORTHOPEDICS | Facility: CLINIC | Age: 35
End: 2020-08-31
Payer: COMMERCIAL

## 2020-08-31 VITALS
HEART RATE: 75 BPM | BODY MASS INDEX: 28.84 KG/M2 | SYSTOLIC BLOOD PRESSURE: 122 MMHG | RESPIRATION RATE: 17 BRPM | WEIGHT: 194.69 LBS | OXYGEN SATURATION: 96 % | DIASTOLIC BLOOD PRESSURE: 70 MMHG | HEIGHT: 69 IN | TEMPERATURE: 98 F

## 2020-08-31 DIAGNOSIS — M62.81 QUADRICEPS WEAKNESS: ICD-10-CM

## 2020-08-31 DIAGNOSIS — R26.2 DIFFICULTY IN WALKING: ICD-10-CM

## 2020-08-31 DIAGNOSIS — M25.661 DECREASED RANGE OF MOTION (ROM) OF RIGHT KNEE: ICD-10-CM

## 2020-08-31 DIAGNOSIS — M25.469 SWELLING OF KNEE: ICD-10-CM

## 2020-08-31 DIAGNOSIS — S83.511A COMPLETE TEAR OF ANTERIOR CRUCIATE LIGAMENT OF RIGHT KNEE, INITIAL ENCOUNTER: Primary | ICD-10-CM

## 2020-08-31 DIAGNOSIS — R68.89 DIFFICULTY PARTICIPATING IN SPORTING ACTIVITIES: ICD-10-CM

## 2020-08-31 DIAGNOSIS — M25.561 ACUTE PAIN OF RIGHT KNEE: ICD-10-CM

## 2020-08-31 PROCEDURE — 3008F PR BODY MASS INDEX (BMI) DOCUMENTED: ICD-10-PCS | Mod: CPTII,S$GLB,, | Performed by: ORTHOPAEDIC SURGERY

## 2020-08-31 PROCEDURE — 97110 THERAPEUTIC EXERCISES: CPT | Mod: PN

## 2020-08-31 PROCEDURE — 99212 PR OFFICE/OUTPT VISIT, EST, LEVL II, 10-19 MIN: ICD-10-PCS | Mod: S$GLB,,, | Performed by: ORTHOPAEDIC SURGERY

## 2020-08-31 PROCEDURE — 3008F BODY MASS INDEX DOCD: CPT | Mod: CPTII,S$GLB,, | Performed by: ORTHOPAEDIC SURGERY

## 2020-08-31 PROCEDURE — 99999 PR PBB SHADOW E&M-EST. PATIENT-LVL IV: CPT | Mod: PBBFAC,,, | Performed by: ORTHOPAEDIC SURGERY

## 2020-08-31 PROCEDURE — 99212 OFFICE O/P EST SF 10 MIN: CPT | Mod: S$GLB,,, | Performed by: ORTHOPAEDIC SURGERY

## 2020-08-31 PROCEDURE — 99999 PR PBB SHADOW E&M-EST. PATIENT-LVL IV: ICD-10-PCS | Mod: PBBFAC,,, | Performed by: ORTHOPAEDIC SURGERY

## 2020-08-31 NOTE — PROGRESS NOTES
Follow up visit    History of Present Illness:   Solomon comes to the office for follow up evaluation of right knee pain - ACL tear and media/laterla meniscus tears.  Recommended treatment at the last visit included PT for prehab.  Since the last visit his pain has improved. ROM improving. Still has pain with rotational motion    ROS: unremarkable and no change since last visit    Physical Examination:    NAD  Right knee   A/PROM 5-120  1A Lachmann    Radiographic imaging:  No new imaging     Assessment/Plan:  34 y.o. male  with Right ACL tear    We discussed the etiology of persistent pain and further treatment options.  1. Continue pt - motion of R knee needs to be symmetric to left - particularly needs to work on extension   2. RTC 2 weeks for re check     All questions were answered in detail. The patient  verbalized the understanding of the treatment plan and is in full agreement with the treatment plan.

## 2020-08-31 NOTE — PROGRESS NOTES
Physical Therapy Treatment Note     Name: Solomon Busby Jr.  Clinic Number: [id number]    Therapy Diagnosis:   Encounter Diagnoses   Name Primary?    Quadriceps weakness     Decreased range of motion (ROM) of right knee     Acute pain of right knee     Difficulty in walking     Difficulty participating in sporting activities     Swelling of knee      Physician: Ines Marina MD    Visit Date: 8/31/2020    Physician Orders: PT Eval and Treat   Medical Diagnosis from Referral: S89.91XA (ICD-10-CM) - Injury of right knee, initial encounter   Evaluation Date: 8/11/2020  Authorization Period Expiration: 9/11/20  Plan of Care Expiration: 11/3/20  Visit # / Visits authorized: 5/ 20  PN due 9/8/20    Time In: 2:30  pm  Time Out:  3: 15 pm  Total Billable Time: 44  minutes    Precautions: Standard      Subjective     Pt reports: he just came from MD and she wants him to keep working on ROM. He will return to her 9/14/20 to discuss surgical options.   He was compliant with home exercise program.  Response to previous treatment: no adverse reactions  Functional change: Ambulating without crutches.     Pain: 4/10  Location: right knee      Objective     R Knee Hyperext/ext/flx AROM:  0  /2 / 115 degrees (120 PROM)    Gait: Ambulates without AD and decreased R TKE throughout midstance and early heel rise.     Solomon received therapeutic exercises (bold exercises performed today) to develop strength, endurance and ROM for 44 minutes including:  Upright bike x 6 minutes (initaited session with full revolutions)  Heel slides 20 x 10 sec, 10  With overpressure from PT  SLR flexion 3 x 12 1#  Prone quad stretch 3 x 1 min  Prone hang  X 4 min 4 #  Shuttle MVP SL press 3 x 15 2 black bands  SL deadlift 3 x 10 3 sec pause at bottom   R SLS on foam with 3 direction toe taps (ant, lateral, posterior) 12 cycles  Step up to SLS 8 inch box + BTB TKE, 3 x 10 with 3 sec SLS hold   Side steps with band (knees) Black T band 3 x 25 ft  ea direction   Forward/backward steps with band (knees) Black T band 3 x 25 ft ea direction   Sidelying hip abduction 3 x 12 2# B    Solomon received the following manual therapy techniques: Joint mobilizations were applied to the: R knee for 0 minutes, including:      Solomon participated in neuromuscular re-education activities to improve: Coordination for 00 minutes. The following activities were included:  [place]ican with quad set x 10 min 10 sec on, 10 sec off (34 mA, 50%, 2 sec ramp up)       Solomon participated in dynamic functional therapeutic activities to improve functional performance for 0  minutes, including:      Solomon participated in gait training to improve functional mobility and safety for 0  minutes, including:      Solomon received the following supervised modalities after being cleared for contradictions: not performed      Solomon received cold pack for 00 minutes to R knee with heel prop.      Home Exercises Provided and Patient Education Provided     Education provided:   - Continue with HEP    Written Home Exercises Provided: Patient instructed to cont prior HEP.  Exercises were reviewed and Solomon was able to demonstrate them prior to the end of the session.  Solomon demonstrated good  understanding of the education provided.     See EMR under Patient Instructions for exercises provided prior visit.    Assessment   The patient demonstrates 115 degrees AROM/120 degrees PROM today with end range pain. Still lacking full knee extension as evidenced by limited heel contact during gait. Required cueing to maintain weight in heel during step ups as patient tends to stay on toes due to lack of quad strength. Added weight to SL deadlift and patient reported some soreness in posterior knee after. Unstable surface training still performed to target neuromuscular control. The patient will continue to benefit from skilled PT to address ROM deficits prior to next MD appointment on 9/14/20.   Solomon is  progressing well towards his goals.   Pt prognosis is Good.     Pt will continue to benefit from skilled outpatient physical therapy to address the deficits listed in the problem list box on initial evaluation, provide pt/family education and to maximize pt's level of independence in the home and community environment.     Pt's spiritual, cultural and educational needs considered and pt agreeable to plan of care and goals.      Anticipated Barriers for therapy: none    Goals:  Short Term Goals: 4 weeks   1. The patient with report compliance with HEP to maximize functional outcomes.  2. The patient will demonstrate R knee AROM >/= 110 degrees flexion 0 degrees extension to improve quality of gait pattern.  3. The patient will report </= 43 % functional limitation on FOTO to indicate an improvement in overall function.  4. The patient will report current pain </= 3/10 to improve quality of sleep.      Long Term Goals: 8 weeks   1. The patient will demonstrate understanding and performance of advanced HEP to allow for independence once discharged from therapy.   2.  The patient will demonstrate R knee AROM >/= 120 flexion to improve ease of stair climbing.   3. The patient will report </= 35 % functional limitation on FOTO to indicate an improvement in overall function.  4. The patient will demonstrate R knee flexion and extension MMT >/= 4/5 to allow for increased ease of squatting during ADL's and work activities.       Plan     Continue with skilled PT.     LEANDER SILVA PT

## 2020-09-08 ENCOUNTER — CLINICAL SUPPORT (OUTPATIENT)
Dept: REHABILITATION | Facility: HOSPITAL | Age: 35
End: 2020-09-08
Attending: ORTHOPAEDIC SURGERY
Payer: COMMERCIAL

## 2020-09-08 DIAGNOSIS — M25.561 ACUTE PAIN OF RIGHT KNEE: ICD-10-CM

## 2020-09-08 DIAGNOSIS — M62.81 QUADRICEPS WEAKNESS: ICD-10-CM

## 2020-09-08 DIAGNOSIS — M25.661 DECREASED RANGE OF MOTION (ROM) OF RIGHT KNEE: ICD-10-CM

## 2020-09-08 DIAGNOSIS — R26.2 DIFFICULTY IN WALKING: ICD-10-CM

## 2020-09-08 DIAGNOSIS — M25.469 SWELLING OF KNEE: ICD-10-CM

## 2020-09-08 DIAGNOSIS — R68.89 DIFFICULTY PARTICIPATING IN SPORTING ACTIVITIES: ICD-10-CM

## 2020-09-08 PROCEDURE — 97110 THERAPEUTIC EXERCISES: CPT | Mod: PN

## 2020-09-08 NOTE — PROGRESS NOTES
Physical Therapy Treatment Note     Name: Solomon Busby Jr.  Clinic Number: [id number]    Therapy Diagnosis:   Encounter Diagnoses   Name Primary?    Quadriceps weakness     Decreased range of motion (ROM) of right knee     Acute pain of right knee     Difficulty in walking     Difficulty participating in sporting activities     Swelling of knee      Physician: Ines Marina MD    Visit Date: 9/8/2020    Physician Orders: PT Eval and Treat   Medical Diagnosis from Referral: S89.91XA (ICD-10-CM) - Injury of right knee, initial encounter   Evaluation Date: 8/11/2020  Authorization Period Expiration: 9/11/20  Plan of Care Expiration: 11/3/20  Visit # / Visits authorized: 6/ 20  PN due 9/8/20 (performed today)     Time In: 2:22  pm  Time Out: 2:59  pm  Total Billable Time: 37   minutes    Precautions: Standard      Subjective     Pt reports: he is with no new complaints. Sees MD soon.   He was compliant with home exercise program.  Response to previous treatment: no adverse reactions  Functional change: Ambulating without crutches.     Pain: 3/10  Location: right knee      Objective   Updated 9/8/20  R Knee Hyperext/ext/flx AROM:  0  /2 / 115 degrees (120 PROM)    Gait: Ambulates without AD and decreased R TKE throughout midstance and early heel rise.   R knee flexion MMT: 4+/5  R knee extension MMT: 4-/5    Solomon received therapeutic exercises (bold exercises performed today) to develop strength, endurance and ROM for 37 minutes including:  Upright bike x 5 minutes level 5  Heel slides 20 x 10 sec  SLR flexion 4 x 10 2#  Prone quad stretch 2 x 1 min  Prone hang  X 2 min 4 #  Squats 12 inch + 2 airex 4 x 10 20#   Shuttle MVP SL press 3 x 15 3 black bands  Lateral heel tap 3 x 10 6 inches  SL deadlift 3 x 10 20#  R SLS on foam with 3 direction toe taps (ant, lateral, posterior) 12 cycles  Step up to SLS 8 inch box + BTB TKE, 3 x 10 with 3 sec SLS hold   Side steps with band (knees) Black T band 3 x 25 ft ea  direction   Forward/backward steps with band (knees) Black T band 3 x 25 ft ea direction   Sidelying hip abduction 3 x 12 2# B    Solomon received the following manual therapy techniques: Joint mobilizations were applied to the: R knee for 0 minutes, including:      Solomon participated in neuromuscular re-education activities to improve: Coordination for 00 minutes. The following activities were included:        Solomon participated in dynamic functional therapeutic activities to improve functional performance for 0  minutes, including:      Solomon received cold pack for 00 minutes to R knee with heel prop.      Home Exercises Provided and Patient Education Provided     Education provided:   - Continue with HEP    Written Home Exercises Provided: Patient instructed to cont prior HEP.  Exercises were reviewed and Solomon was able to demonstrate them prior to the end of the session.  Solomon demonstrated good  understanding of the education provided.     See EMR under Patient Instructions for exercises provided prior visit.    Assessment   The patient presents for his 6th visit and re-assessment performed today. He demonstrates 115 AROM/ 120 PROM R knee flexion with - 2 degrees extension which is an improvement since initial evaluation. He met 3/4 STG's (not tested on FOTO today) and is progressing towards LTG's. Sessions have focused on improving knee ROM and building quad strength. He continues to demonstrate early heel rise on R during gait  Due to lack of quadriceps strength but this has also improved over the last several sessions. Continues to remain a candidate for skilled PT to address goals not met.   Solomon is progressing well towards his goals.   Pt prognosis is Good.     Pt will continue to benefit from skilled outpatient physical therapy to address the deficits listed in the problem list box on initial evaluation, provide pt/family education and to maximize pt's level of independence in the home and community  environment.     Pt's spiritual, cultural and educational needs considered and pt agreeable to plan of care and goals.      Anticipated Barriers for therapy: none    Goals:  Short Term Goals: 4 weeks updated 9/8/20  1. The patient with report compliance with HEP to maximize functional outcomes.met  2. The patient will demonstrate R knee AROM >/= 110 degrees flexion 0 degrees extension to improve quality of gait pattern. met  3. The patient will report </= 43 % functional limitation on FOTO to indicate an improvement in overall function. Not tested today  4. The patient will report current pain </= 3/10 to improve quality of sleep. met     Long Term Goals: 8 weeks  updated 9/8/20  1. The patient will demonstrate understanding and performance of advanced HEP to allow for independence once discharged from therapy. progressing  2.  The patient will demonstrate R knee AROM >/= 120 flexion to improve ease of stair climbing. progressing  3. The patient will report </= 35 % functional limitation on FOTO to indicate an improvement in overall function.  Not tested today  4. The patient will demonstrate R knee flexion and extension MMT >/= 4/5 to allow for increased ease of squatting during ADL's and work activities.  progressing      Plan     Continue with skilled PT.     LEANDER SILVA, PT

## 2020-09-13 ENCOUNTER — PATIENT OUTREACH (OUTPATIENT)
Dept: ADMINISTRATIVE | Facility: OTHER | Age: 35
End: 2020-09-13

## 2020-09-15 ENCOUNTER — OFFICE VISIT (OUTPATIENT)
Dept: ORTHOPEDICS | Facility: CLINIC | Age: 35
End: 2020-09-15
Payer: COMMERCIAL

## 2020-09-15 VITALS
SYSTOLIC BLOOD PRESSURE: 132 MMHG | DIASTOLIC BLOOD PRESSURE: 82 MMHG | TEMPERATURE: 98 F | RESPIRATION RATE: 17 BRPM | HEART RATE: 89 BPM | BODY MASS INDEX: 28.84 KG/M2 | HEIGHT: 69 IN | OXYGEN SATURATION: 97 % | WEIGHT: 194.69 LBS

## 2020-09-15 DIAGNOSIS — S83.511A RUPTURE OF ANTERIOR CRUCIATE LIGAMENT OF RIGHT KNEE, INITIAL ENCOUNTER: ICD-10-CM

## 2020-09-15 DIAGNOSIS — S83.241A ACUTE MEDIAL MENISCUS TEAR OF RIGHT KNEE, INITIAL ENCOUNTER: Primary | ICD-10-CM

## 2020-09-15 PROCEDURE — 99999 PR PBB SHADOW E&M-EST. PATIENT-LVL IV: ICD-10-PCS | Mod: PBBFAC,,, | Performed by: ORTHOPAEDIC SURGERY

## 2020-09-15 PROCEDURE — 3008F BODY MASS INDEX DOCD: CPT | Mod: CPTII,S$GLB,, | Performed by: ORTHOPAEDIC SURGERY

## 2020-09-15 PROCEDURE — 99213 PR OFFICE/OUTPT VISIT, EST, LEVL III, 20-29 MIN: ICD-10-PCS | Mod: S$GLB,,, | Performed by: ORTHOPAEDIC SURGERY

## 2020-09-15 PROCEDURE — 99213 OFFICE O/P EST LOW 20 MIN: CPT | Mod: S$GLB,,, | Performed by: ORTHOPAEDIC SURGERY

## 2020-09-15 PROCEDURE — 3008F PR BODY MASS INDEX (BMI) DOCUMENTED: ICD-10-PCS | Mod: CPTII,S$GLB,, | Performed by: ORTHOPAEDIC SURGERY

## 2020-09-15 PROCEDURE — 99999 PR PBB SHADOW E&M-EST. PATIENT-LVL IV: CPT | Mod: PBBFAC,,, | Performed by: ORTHOPAEDIC SURGERY

## 2020-09-15 NOTE — PROGRESS NOTES
Follow up visit     History of Present Illness:   Solomon comes to the office for follow up evaluation of right knee pain - ACL tear and media/laterla meniscus tears.  Recommended treatment at the last visit included PT for prehab.  Since the last visit his pain has improved. ROM improving. Still has pain with rotational motion, kneeling, cycling.  He does have a sensation of instability and is very bothersome to him     ROS: unremarkable and no change since last visit     Physical Examination:    NAD  Right knee   Able to perform straight leg raise  A/PROM  2 - 120  1A Lachmann  Tender palpation over the MCL, no associated instability     Radiographic imaging:  No new imaging      Assessment/Plan:  34 y.o. male  with Right ACL tear     We discussed the etiology of persistent pain and further treatment options.  1. Continue pt - motion of R knee needs to be symmetric to left - particularly needs to work on extension   2. Will book for surgery next week - recheck Monday or Tuesday. Will check in with PT re his progress later this week     Signed consents for surgery today.     The spectrum of treatment options were discussed with the patient, including nonoperative and operative options.  After thorough discussion, the patient has elected to undergo surgical treatment to include:    right acl reconstruction with HS or quad autograft with allograft as backup  Possible medial meniscus tear vs debridement     We have discussed the surgery and anticipated recovery.  He understands that there may be limited mobility up to several weeks after surgery depending on procedures that are performed at the time of surgery.    The details of the surgical procedure were explained, including the location of probable incisions and a description of likely hardware and/or grafts to be used.  The patient understands the likely convalescence after surgery, in particular the expected postop rehab and recovery course. Alternatives both  operative and non-operative with associated risks and benefits discussed. The outlined risks and potential complications of the proposed procedure include but are not limited to: infection, poor wound healing, scarring, stiffness, swelling, continued or recurrent pain, instability, hardware or prosthetic failure, symptomatic hardware requiring removal, weakness, neurovascular injury, numbness, chronic regional pain disorder, tissue nonhealing/irreparability/retear, contralateral ligament injury, chondral injury, arthritis, fracture, blood clot formation, inability to return to previous level of activity, anesthetic or regional block complication up to death, need for additional procedure as indicated, and potential need for further surgery.    He was also informed and understands the risks of surgery are greater for patients with a current condition or history of heart disease, obesity, clotting disorders, recurrent infections, steroid use, current or past smoking, and factors such as sedentary lifestyle and noncompliance with medications, therapy or follow-up. The degree of the increased risk is hard to estimate with any degree of precision.    All questions were answered. The patient has verbalized understanding of these issues and wishes to proceed as discussed.   Will proceed with medical clearance.      All questions were answered in detail. The patient  verbalized the understanding of the treatment plan and is in full agreement with the treatment plan.

## 2020-09-17 ENCOUNTER — CLINICAL SUPPORT (OUTPATIENT)
Dept: REHABILITATION | Facility: HOSPITAL | Age: 35
End: 2020-09-17
Attending: ORTHOPAEDIC SURGERY
Payer: COMMERCIAL

## 2020-09-17 DIAGNOSIS — M25.469 SWELLING OF KNEE: ICD-10-CM

## 2020-09-17 DIAGNOSIS — S83.511A COMPLETE TEAR OF RIGHT ACL, INITIAL ENCOUNTER: ICD-10-CM

## 2020-09-17 DIAGNOSIS — S83.261A PERIPHERAL TEAR OF LATERAL MENISCUS OF RIGHT KNEE AS CURRENT INJURY, INITIAL ENCOUNTER: ICD-10-CM

## 2020-09-17 DIAGNOSIS — M25.561 ACUTE PAIN OF RIGHT KNEE: ICD-10-CM

## 2020-09-17 DIAGNOSIS — R68.89 DIFFICULTY PARTICIPATING IN SPORTING ACTIVITIES: ICD-10-CM

## 2020-09-17 DIAGNOSIS — M62.81 QUADRICEPS WEAKNESS: ICD-10-CM

## 2020-09-17 DIAGNOSIS — S83.241A ACUTE MEDIAL MENISCUS TEAR OF RIGHT KNEE, INITIAL ENCOUNTER: ICD-10-CM

## 2020-09-17 DIAGNOSIS — M25.661 DECREASED RANGE OF MOTION (ROM) OF RIGHT KNEE: ICD-10-CM

## 2020-09-17 DIAGNOSIS — R26.2 DIFFICULTY IN WALKING: ICD-10-CM

## 2020-09-17 DIAGNOSIS — S83.511A COMPLETE TEAR OF ANTERIOR CRUCIATE LIGAMENT OF RIGHT KNEE, INITIAL ENCOUNTER: Primary | ICD-10-CM

## 2020-09-17 PROCEDURE — 97110 THERAPEUTIC EXERCISES: CPT | Mod: PN

## 2020-09-17 RX ORDER — SODIUM CHLORIDE 9 MG/ML
INJECTION, SOLUTION INTRAVENOUS CONTINUOUS
Status: CANCELLED | OUTPATIENT
Start: 2020-09-17

## 2020-09-17 NOTE — PROGRESS NOTES
Physical Therapy Treatment Note     Name: Solomon Busby Jr.  Clinic Number: [id number]    Therapy Diagnosis:   Encounter Diagnoses   Name Primary?    Quadriceps weakness     Decreased range of motion (ROM) of right knee     Acute pain of right knee     Difficulty in walking     Difficulty participating in sporting activities     Swelling of knee      Physician: Ines Marina MD    Visit Date: 9/17/2020    Physician Orders: PT Eval and Treat   Medical Diagnosis from Referral: S89.91XA (ICD-10-CM) - Injury of right knee, initial encounter   Evaluation Date: 8/11/2020  Authorization Period Expiration: 9/11/20  Plan of Care Expiration: 11/3/20  Visit # / Visits authorized: 7/ 20  PN due 10/6/20    Time In: 8:18 am (18 minutes late)   Time Out: 9:00  am  Total Billable Time:   32  minutes    Precautions: Standard      Subjective     Pt reports: MD is planning surgery when he is able to get better knee extension/quad strength. He reports he has been trying to run but it is hard.   He was compliant with home exercise program.  Response to previous treatment: no adverse reactions  Functional change: Ambulating without crutches.     Pain: 3/10  Location: right knee      Objective   Updated 9/17/20  R Knee Hyperext/ext/flx AROM:  0  /0 / 115 degrees (120 PROM)    Gait: Ambulates without AD and decreased R TKE throughout midstance and early heel rise.   R knee flexion MMT: 4+/5  R knee extension MMT: 4-/5    Solomon received therapeutic exercises (bold exercises performed today) to develop strength, endurance and ROM for 37 minutes including:  Upright bike x 5 minutes level 5  Prone hang  X 4 min 4 #  Heel prop x 5 min 14#  Shuttle MVP SL press 4 x 10 3 bands  TKE with maroon cook band 5 x 10 5 sec holds   Gastroc stretch on wedge 3 x 1 min  Prone quad stretch 3 x 1 min    Solomon received the following manual therapy techniques: Joint mobilizations were applied to the: R knee for 0 minutes, including:      Solomon  participated in neuromuscular re-education activities to improve: Coordination for 00 minutes. The following activities were included:        Solomon participated in dynamic functional therapeutic activities to improve functional performance for 0  minutes, including:      Solomon received cold pack for 10 minutes to R knee with heel prop.      Home Exercises Provided and Patient Education Provided     Education provided:   - Continue with HEP    Written Home Exercises Provided: Patient instructed to cont prior HEP.  Exercises were reviewed and Solomon was able to demonstrate them prior to the end of the session.  Solomon demonstrated good  understanding of the education provided.     See EMR under Patient Instructions for exercises provided prior visit.    Assessment   The patient continues to present to clinic with decreased TKE during gait and minimal heel strike on R. Increased knee extension stretches today and patient was able to perform 20 reps unbroken SLR flexion without extensor lag and 0 degrees knee extension. He was able to improve amount of heel strike by end of visit. Pt is scheduled to f/u with MD next week regarding his progress and determine when surgery will be. Pt educated to increase amount of heel prop/prone hangs to 10 minutes each with weight at home. Also discussed avoiding running as patient does not have adequate knee ROM/strength to perform this. Limited session due to late arrival.   Solomon is progressing well towards his goals.   Pt prognosis is Good.     Pt will continue to benefit from skilled outpatient physical therapy to address the deficits listed in the problem list box on initial evaluation, provide pt/family education and to maximize pt's level of independence in the home and community environment.     Pt's spiritual, cultural and educational needs considered and pt agreeable to plan of care and goals.      Anticipated Barriers for therapy: none    Goals:  Short Term Goals: 4 weeks  updated 9/8/20  1. The patient with report compliance with HEP to maximize functional outcomes.met  2. The patient will demonstrate R knee AROM >/= 110 degrees flexion 0 degrees extension to improve quality of gait pattern. met  3. The patient will report </= 43 % functional limitation on FOTO to indicate an improvement in overall function. Not tested today  4. The patient will report current pain </= 3/10 to improve quality of sleep. met     Long Term Goals: 8 weeks  updated 9/8/20  1. The patient will demonstrate understanding and performance of advanced HEP to allow for independence once discharged from therapy. progressing  2.  The patient will demonstrate R knee AROM >/= 120 flexion to improve ease of stair climbing. progressing  3. The patient will report </= 35 % functional limitation on FOTO to indicate an improvement in overall function.  Not tested today  4. The patient will demonstrate R knee flexion and extension MMT >/= 4/5 to allow for increased ease of squatting during ADL's and work activities.  progressing      Plan     Continue with skilled PT.     LEANDER SILVA, PT

## 2020-09-21 ENCOUNTER — HOSPITAL ENCOUNTER (OUTPATIENT)
Dept: PREADMISSION TESTING | Facility: HOSPITAL | Age: 35
Discharge: HOME | End: 2020-09-21
Attending: ORTHOPAEDIC SURGERY
Payer: COMMERCIAL

## 2020-09-21 ENCOUNTER — ANESTHESIA EVENT (OUTPATIENT)
Dept: SURGERY | Facility: HOSPITAL | Age: 35
End: 2020-09-21
Payer: COMMERCIAL

## 2020-09-21 ENCOUNTER — OFFICE VISIT (OUTPATIENT)
Dept: ORTHOPEDICS | Facility: CLINIC | Age: 35
End: 2020-09-21
Payer: COMMERCIAL

## 2020-09-21 VITALS
WEIGHT: 198.19 LBS | BODY MASS INDEX: 31.11 KG/M2 | OXYGEN SATURATION: 98 % | RESPIRATION RATE: 17 BRPM | SYSTOLIC BLOOD PRESSURE: 122 MMHG | HEIGHT: 67 IN | TEMPERATURE: 98 F | DIASTOLIC BLOOD PRESSURE: 86 MMHG | HEART RATE: 76 BPM

## 2020-09-21 VITALS
DIASTOLIC BLOOD PRESSURE: 81 MMHG | SYSTOLIC BLOOD PRESSURE: 137 MMHG | RESPIRATION RATE: 18 BRPM | HEIGHT: 67 IN | OXYGEN SATURATION: 98 % | HEART RATE: 63 BPM | WEIGHT: 196.19 LBS | BODY MASS INDEX: 30.79 KG/M2

## 2020-09-21 DIAGNOSIS — S83.511A COMPLETE TEAR OF ANTERIOR CRUCIATE LIGAMENT OF RIGHT KNEE, INITIAL ENCOUNTER: Primary | ICD-10-CM

## 2020-09-21 DIAGNOSIS — S83.261A PERIPHERAL TEAR OF LATERAL MENISCUS OF RIGHT KNEE AS CURRENT INJURY, INITIAL ENCOUNTER: ICD-10-CM

## 2020-09-21 DIAGNOSIS — Z01.818 PREOP TESTING: Primary | ICD-10-CM

## 2020-09-21 DIAGNOSIS — S83.241A ACUTE MEDIAL MENISCUS TEAR OF RIGHT KNEE, INITIAL ENCOUNTER: ICD-10-CM

## 2020-09-21 LAB
ANION GAP SERPL CALC-SCNC: 9 MMOL/L (ref 8–16)
BASOPHILS # BLD AUTO: 0.1 K/UL (ref 0–0.2)
BASOPHILS NFR BLD: 1.3 % (ref 0–1.9)
BUN SERPL-MCNC: 9 MG/DL (ref 6–20)
CALCIUM SERPL-MCNC: 9 MG/DL (ref 8.7–10.5)
CHLORIDE SERPL-SCNC: 106 MMOL/L (ref 95–110)
CO2 SERPL-SCNC: 24 MMOL/L (ref 23–29)
CREAT SERPL-MCNC: 1 MG/DL (ref 0.5–1.4)
DIFFERENTIAL METHOD: ABNORMAL
EOSINOPHIL # BLD AUTO: 0.3 K/UL (ref 0–0.5)
EOSINOPHIL NFR BLD: 3.2 % (ref 0–8)
ERYTHROCYTE [DISTWIDTH] IN BLOOD BY AUTOMATED COUNT: 13.1 % (ref 11.5–14.5)
EST. GFR  (AFRICAN AMERICAN): >60 ML/MIN/1.73 M^2
EST. GFR  (NON AFRICAN AMERICAN): >60 ML/MIN/1.73 M^2
GLUCOSE SERPL-MCNC: 110 MG/DL (ref 70–110)
HCT VFR BLD AUTO: 46.1 % (ref 40–54)
HGB BLD-MCNC: 15.4 G/DL (ref 14–18)
IMM GRANULOCYTES # BLD AUTO: 0.02 K/UL (ref 0–0.04)
IMM GRANULOCYTES NFR BLD AUTO: 0.3 % (ref 0–0.5)
LYMPHOCYTES # BLD AUTO: 3.9 K/UL (ref 1–4.8)
LYMPHOCYTES NFR BLD: 50.3 % (ref 18–48)
MCH RBC QN AUTO: 27.2 PG (ref 27–31)
MCHC RBC AUTO-ENTMCNC: 33.4 G/DL (ref 32–36)
MCV RBC AUTO: 81 FL (ref 82–98)
MONOCYTES # BLD AUTO: 0.6 K/UL (ref 0.3–1)
MONOCYTES NFR BLD: 7.8 % (ref 4–15)
NEUTROPHILS # BLD AUTO: 2.9 K/UL (ref 1.8–7.7)
NEUTROPHILS NFR BLD: 37.1 % (ref 38–73)
NRBC BLD-RTO: 0 /100 WBC
PLATELET # BLD AUTO: 286 K/UL (ref 150–350)
PMV BLD AUTO: 9.3 FL (ref 9.2–12.9)
POTASSIUM SERPL-SCNC: 4.3 MMOL/L (ref 3.5–5.1)
RBC # BLD AUTO: 5.67 M/UL (ref 4.6–6.2)
SARS-COV-2 RDRP RESP QL NAA+PROBE: NEGATIVE
SODIUM SERPL-SCNC: 139 MMOL/L (ref 136–145)
WBC # BLD AUTO: 7.84 K/UL (ref 3.9–12.7)

## 2020-09-21 PROCEDURE — 99999 PR PBB SHADOW E&M-EST. PATIENT-LVL IV: CPT | Mod: PBBFAC,,, | Performed by: ORTHOPAEDIC SURGERY

## 2020-09-21 PROCEDURE — 99999 PR PBB SHADOW E&M-EST. PATIENT-LVL IV: ICD-10-PCS | Mod: PBBFAC,,, | Performed by: ORTHOPAEDIC SURGERY

## 2020-09-21 PROCEDURE — 3008F BODY MASS INDEX DOCD: CPT | Mod: CPTII,S$GLB,, | Performed by: ORTHOPAEDIC SURGERY

## 2020-09-21 PROCEDURE — 36415 COLL VENOUS BLD VENIPUNCTURE: CPT

## 2020-09-21 PROCEDURE — 3008F PR BODY MASS INDEX (BMI) DOCUMENTED: ICD-10-PCS | Mod: CPTII,S$GLB,, | Performed by: ORTHOPAEDIC SURGERY

## 2020-09-21 PROCEDURE — 80048 BASIC METABOLIC PNL TOTAL CA: CPT

## 2020-09-21 PROCEDURE — 99213 PR OFFICE/OUTPT VISIT, EST, LEVL III, 20-29 MIN: ICD-10-PCS | Mod: S$GLB,,, | Performed by: ORTHOPAEDIC SURGERY

## 2020-09-21 PROCEDURE — 99213 OFFICE O/P EST LOW 20 MIN: CPT | Mod: S$GLB,,, | Performed by: ORTHOPAEDIC SURGERY

## 2020-09-21 PROCEDURE — 85025 COMPLETE CBC W/AUTO DIFF WBC: CPT

## 2020-09-21 PROCEDURE — U0002 COVID-19 LAB TEST NON-CDC: HCPCS

## 2020-09-21 RX ORDER — CETIRIZINE HYDROCHLORIDE 5 MG/1
5 TABLET ORAL DAILY
COMMUNITY

## 2020-09-21 NOTE — PROGRESS NOTES
Follow up visit    Interval history (09/21/2020): Has been followed since initial visit during prehab to monitor progress and readiness for surgery  His knee does still feel unstable  Pain at poteromedial knee     Per PT has regained quad strength and ROM, here for re check       Initial visit ( 08/06/2020 ): Solomon Busby Jr. is a 34 y.o. male who presents today complaining of Injury, Pain, and Swelling of the Right Knee     Duration of symptoms:  2 days  Trauma or new activity: yes - was playing basketball, felt a pop when doing a pivot on a planted foot.  Was not able to bear weight and presented to the ER. XR negative. Has been in [facility] on crutches. Is able to put a small amount of weight on the knee. Pain with ROM   Pain is constant  Radicular symptoms: no numbness, paresthesias   Associated symptoms:  swelling.    Prior treatment:  None  Pain does interfere with activities of daily living .     This is the extent of the patient's complaints at this time.      Occupation: Works on the [employer]    Review of Systems   All other systems reviewed and are negative.        Review of patient's allergies indicates:  No Known Allergies        Current Outpatient Medications:     acetaminophen-codeine 300-30mg (TYLENOL #3) 300-30 mg Tab, , Disp: , Rfl:     albuterol-ipratropium (DUO-NEB) 2.5 mg-0.5 mg/3 mL nebulizer solution, Take 3 mLs by nebulization every 6 (six) hours as needed for Wheezing or Shortness of Breath., Disp: 100 vial, Rfl: 1    cyclobenzaprine (FLEXERIL) 10 MG tablet, Take 1 tablet (10 mg total) by mouth 3 (three) times daily as needed for Muscle spasms., Disp: 30 tablet, Rfl: 0    fluticasone propionate (FLONASE) 50 mcg/actuation nasal spray, 2 sprays (100 mcg total) by Each Nostril route once daily., Disp: 15 g, Rfl: 0    HYDROcodone-acetaminophen (NORCO) 5-325 mg per tablet, Take 1 tablet by mouth every 4 (four) hours as needed., Disp: 18 tablet, Rfl: 0    ibuprofen (ADVIL,MOTRIN) 800 MG tablet, Take  1 tablet (800 mg total) by mouth 3 (three) times daily., Disp: 30 tablet, Rfl: 0    loratadine (CLARITIN) 10 mg tablet, TAKE 1 TABLET (10 MG TOTAL) BY MOUTH DAILY AS NEEDED FOR ALLERGIES (OR RUNNY NOSE)., Disp: 30 tablet, Rfl: 3    loratadine (CLARITIN) 10 mg tablet, Take 1 tablet (10 mg total) by mouth daily as needed for Allergies (or runny nose)., Disp: 30 tablet, Rfl: 0    meloxicam (MOBIC) 15 MG tablet, Take 1 tablet (15 mg total) by mouth daily as needed for Pain., Disp: 14 tablet, Rfl: 0    methylPREDNISolone (MEDROL DOSEPACK) 4 mg tablet, use as directed, Disp: 1 Package, Rfl: 0    oxyCODONE-acetaminophen (PERCOCET) 5-325 mg per tablet, Take 1 tablet by mouth every 4 (four) hours as needed., Disp: 10 tablet, Rfl: 0    QVAR REDIHALER 40 mcg/actuation HFAB, INHALE 1 PUFF BY MOUTH INTO LUNGS TWICE DAILY, Disp: 10.6 g, Rfl: 2    VENTOLIN HFA 90 mcg/actuation inhaler, INHALE 2 PUFFS BY MOUTH EVERY 6 HOURS AS NEEDED FOR WHEEZING, Disp: 18 Inhaler, Rfl: 2    azelastine (ASTELIN) 137 mcg (0.1 %) nasal spray, 2 sprays (274 mcg total) by Nasal route 2 (two) times daily. (Patient not taking: Reported on 8/6/2020), Disp: 30 mL, Rfl: 0    levocetirizine (XYZAL) 5 MG tablet, Take 1 tablet (5 mg total) by mouth every evening., Disp: 30 tablet, Rfl: 11    triamcinolone acetonide 0.1% (KENALOG) 0.1 % ointment, Apply topically 2 (two) times daily. for 10 days, Disp: 30 g, Rfl: 0          Past Medical History:   Diagnosis Date    Asthma      Headache      Hernia               Patient Active Problem List   Diagnosis    Reactive airway disease    Acute URI               Past Surgical History:   Procedure Laterality Date    HERNIA REPAIR             Social History      Tobacco Use    Smoking status: Never Smoker    Smokeless tobacco: Never Used   Substance Use Topics    Alcohol use: No    Drug use: No               Family History   Problem Relation Age of Onset    Diabetes Mother      Hypertension Mother    "   Carpal tunnel syndrome Father      Liver disease Father      No Known Problems Sister      No Known Problems Brother      Allergies Daughter      Asthma Daughter      No Known Problems Son      No Known Problems Sister      No Known Problems Sister      No Known Problems Brother      No Known Problems Brother           Physical Exam:   Vitals:    09/21/20 1032   BP: 122/86   Pulse: 76   Resp: 17   Temp: 98.4 °F (36.9 °C)   TempSrc: Oral   SpO2: 98%   Weight: 89.9 kg (198 lb 3.1 oz)   Height: 5' 7" (1.702 m)   PainSc:   4   PainLoc: Knee         General: Weight: 79.4 kg (175 lb) Body mass index is 25.84 kg/m².   Patient is alert, awake and oriented to time, place and person. Mood and affect are appropriate.  Patient does not appear to be in any distress, denies any constitutional symptoms and appears stated age.   HEENT:  Pupils are equal and round, sclera are not injected. External examination of ears and nose reveals no abnormalities. Cranial nerves II-X are grossly intact  Skin:  no rashes, abrasions or open wounds on the affected extremity   Resp:  No respiratory distress or audible wheezing   CV: 2+  pulses, all extremities warm and well perfused  Bilateral KNEE EXAMINATION         OBSERVATION / INSPECTION   Gait:                            non antalgic  Alignment:                  neutral  Scars:                         none  Muscle atrophy:          none  Effusion:                     None  Warmth:                      none   Discoloration:              none      TENDERNESS                                                                                                                                Patella                         -                       Lateral joint line                       -   Peripatellar medial      -                       Medial joint line                      + PM joint line      Peripatellar lateral       -                       Medial plica                             - "             Patellar tendon            -                       Popliteal fossa                        -             Quad tendon               -                       Gastrocnemius                        -  Prepatellar Bursa        -                       Quadricep                               -  Tibial tubercle              -                      Thigh/hamstring                      -  Pes anserine/HS         -                       Fibula                                      -  ITB                               -                      Tibia                                        -  Tib/fib joint                   -                      LCL                                          -                         MFC                            -                       MCL:    Proximal                      No longer tender                        LFC                             -                                   Distal                           -                                                                ROM:                          PASSIVE                                                 ACTIVE   Left :                            0 / 150                                               0 / 150    Right :                          10 / 70                                            10 / 80      PATELLOFEMORAL EXAMINATION:  See above noted areas of tenderness.   Patella position               Subluxation / dislocation:         Normal              Sup / Inf;                                  Normal   Crepitus (PF):                                      Absent   Patellar Mobility:                                               Medial-lateral:                         Normal  Superior-inferior:                     Normal   Inferior tilt                                Normal  Patellar tendon:                       Normal  Lateral  tilt:                               Normal  J-sign:                                      Normal  Patellofemoral grind:               non painful         MENISCAL SIGNS:                              Pain on terminal extension:                yes  Pain on terminal flexion:                     yes  Ericks maneuver:                       painful bilaterally, worse medial   Squat                                                  not tested    Apley test     positive        LIGAMENT EXAMINATION:  ACL / Lachman:         1A   PCL-Post.  drawer:   normal (0 - 2 mm)  MCL- Valgus:             normal (0 - 2 mm)  LCL- Varus:  normal (0 - 2 mm)  Pivot shift:     unable to test due to pain, guarding   Dial Test: difference c/w other side  At 30° flexion:  normal (< 5°)   At 90° flexion:  normal (< 5°)   Reverse Pivot Shift:    normal (Equal)      STRENGTH:   (* = with pain)           PAINFUL SIDE  Quadricep                               5/5  Hamstrin/5     EXTREMITY NEURO-VASCULAR EXAMINATION:      Sensation:  Grossly intact to light touch all dermatomal regions.   Motor Function:  Fully intact motor function at hip, knee, foot and ankle    DTRs;  quadriceps and  achilles 2+.    Vascular status:  DP and PT pulses 2+, brisk capillary refill, symmetric.            Imaging: 3 views right knee: no fractures.     MRI: tears of medial and lateral menisci. + ACL tear     I personally reviewed and interpreted the patient's imaging obtained prior to visit      Assessment: 34 y.o. male with right ACL tear and tears of the medial and lateral meniscus     I explained my diagnostic impression and the reasoning behind it in detail, using layman's terms.  Models and/or pictures were used to help in the explanation.        The spectrum of treatment options were discussed with the patient, including nonoperative and operative options.  After thorough discussion, the patient has elected to undergo surgical  treatment to include:    right knee arthroscopy with ACL recon (quad tendon with allograft backup) with possible medial and lateral meniscus debridement vs repair    We have discussed the surgery and anticipated recovery.  He understands that there may be limited mobility up to several weeks after surgery depending on procedures that are performed at the time of surgery.  He understands that rehab protocol will depend on procedure performed on meniscus - will not be able to determine until time of surgery    The details of the surgical procedure were explained, including the location of probable incisions and a description of likely hardware and/or grafts to be used.  The patient understands the likely convalescence after surgery, in particular the expected postop rehab and recovery course. Alternatives both operative and non-operative with associated risks and benefits discussed. The outlined risks and potential complications of the proposed procedure include but are not limited to: infection, poor wound healing, scarring, stiffness, swelling, continued or recurrent pain, instability, hardware or prosthetic failure, symptomatic hardware requiring removal, weakness, neurovascular injury, numbness, chronic regional pain disorder, tissue nonhealing/irreparability/retear, contralateral ligament injury, chondral injury, arthritis, fracture, blood clot formation, inability to return to previous level of activity, anesthetic or regional block complication up to death, need for additional procedure as indicated, and potential need for further surgery.    He was also informed and understands the risks of surgery are greater for patients with a current condition or history of heart disease, obesity, clotting disorders, recurrent infections, steroid use, current or past smoking, and factors such as sedentary lifestyle and noncompliance with medications, therapy or follow-up. The degree of the increased risk is hard to  estimate with any degree of precision.    All questions were answered. The patient has verbalized understanding of these issues and wishes to proceed as discussed.   Will proceed with medical clearance.    All questions were answered in detail. The patient is in full agreement with the treatment plan and will proceed accordingly.  This was discussed with his wife nishant ni as well.        This note was created by combination of typed  and M-Modal dictation. Transcription and phonetic errors may be present.  If there are any questions, please contact me.

## 2020-09-21 NOTE — DISCHARGE INSTRUCTIONS
Your procedure  is scheduled for _9/23/2020_________.    Call [phone] between 2pm and 5pm on _9/22/2020______to find out your arrival time for the day of surgery.    Report to the Emergency Department on the day of your surgery.  You will be escorted to the admitting unit.    Important instructions:   Do not eat or drink after 12 midnight, including water.  It is okay to brush your teeth.  Do not have gum, candy or mints.        Stop taking Aspirin, Ibuprofen, Motrin and Aleve , Fish oil, and Vitamin E for at least 7 days before your surgery. You may use Tylenol unless otherwise instructed by your doctor.         Please shower the night before and the morning of your surgery.        Use Hibiclens soap as instructed by your pre op nurse.   Please place clean linens on your bed the night before surgery. Please wear fresh clean clothing after each shower.     No shaving of procedural area at least 4-5 days before surgery due to increased risk of skin irritation and/or possible infection.     You may wear deodorant only.      Do not wear powder, body lotion or perfume/cologne.     Do not wear any jewelry or have any metal on your body.     You will be asked to remove any dentures or partials for the procedure.     Please bring any documents given to you by your doctor.     If you are going home on the same day of surgery, you must arrange for a family member or a friend to drive you home.  Public transportation is prohibited.  You will not be able to drive home if you were given anesthesia or sedation.     Wear loose fitting clothes allowing for bandages.     Please leave money and valuables home.       You may bring your cell phone.     Call the doctor if fever or illness should occur before your surgery.    Call [phone] to contact us here if needed.

## 2020-09-23 ENCOUNTER — HOSPITAL ENCOUNTER (OUTPATIENT)
Facility: HOSPITAL | Age: 35
Discharge: HOME | End: 2020-09-23
Attending: ORTHOPAEDIC SURGERY | Admitting: ORTHOPAEDIC SURGERY
Payer: COMMERCIAL

## 2020-09-23 ENCOUNTER — ANESTHESIA (OUTPATIENT)
Dept: SURGERY | Facility: HOSPITAL | Age: 35
End: 2020-09-23
Payer: COMMERCIAL

## 2020-09-23 VITALS
TEMPERATURE: 99 F | WEIGHT: 198.19 LBS | HEART RATE: 96 BPM | SYSTOLIC BLOOD PRESSURE: 150 MMHG | DIASTOLIC BLOOD PRESSURE: 87 MMHG | OXYGEN SATURATION: 93 % | RESPIRATION RATE: 19 BRPM | BODY MASS INDEX: 31.04 KG/M2

## 2020-09-23 DIAGNOSIS — S83.241A ACUTE MEDIAL MENISCUS TEAR OF RIGHT KNEE, INITIAL ENCOUNTER: ICD-10-CM

## 2020-09-23 DIAGNOSIS — S83.511A COMPLETE TEAR OF RIGHT ACL, INITIAL ENCOUNTER: Primary | ICD-10-CM

## 2020-09-23 DIAGNOSIS — S83.511A COMPLETE TEAR OF ANTERIOR CRUCIATE LIGAMENT OF RIGHT KNEE, INITIAL ENCOUNTER: ICD-10-CM

## 2020-09-23 DIAGNOSIS — S83.261A PERIPHERAL TEAR OF LATERAL MENISCUS OF RIGHT KNEE AS CURRENT INJURY, INITIAL ENCOUNTER: ICD-10-CM

## 2020-09-23 DIAGNOSIS — Z01.818 PREOP TESTING: ICD-10-CM

## 2020-09-23 PROCEDURE — 37000009 HC ANESTHESIA EA ADD 15 MINS: Performed by: ORTHOPAEDIC SURGERY

## 2020-09-23 PROCEDURE — D9220A PRA ANESTHESIA: ICD-10-PCS | Mod: ANES,,, | Performed by: ANESTHESIOLOGY

## 2020-09-23 PROCEDURE — 63600175 PHARM REV CODE 636 W HCPCS: Performed by: NURSE ANESTHETIST, CERTIFIED REGISTERED

## 2020-09-23 PROCEDURE — 29882 PR KNEE SCOPE,MED OR LAT MENIS REPAIR: ICD-10-PCS | Mod: 51,RT,, | Performed by: ORTHOPAEDIC SURGERY

## 2020-09-23 PROCEDURE — 63600175 PHARM REV CODE 636 W HCPCS: Performed by: ANESTHESIOLOGY

## 2020-09-23 PROCEDURE — C1762 CONN TISS, HUMAN(INC FASCIA): HCPCS | Performed by: ORTHOPAEDIC SURGERY

## 2020-09-23 PROCEDURE — D9220A PRA ANESTHESIA: Mod: CRNA,,, | Performed by: STUDENT IN AN ORGANIZED HEALTH CARE EDUCATION/TRAINING PROGRAM

## 2020-09-23 PROCEDURE — 29882 ARTHRS KNE SRG MNISC RPR M/L: CPT | Mod: 51,RT,, | Performed by: ORTHOPAEDIC SURGERY

## 2020-09-23 PROCEDURE — D9220A PRA ANESTHESIA: Mod: ANES,,, | Performed by: ANESTHESIOLOGY

## 2020-09-23 PROCEDURE — 63600175 PHARM REV CODE 636 W HCPCS: Performed by: ORTHOPAEDIC SURGERY

## 2020-09-23 PROCEDURE — 27201423 OPTIME MED/SURG SUP & DEVICES STERILE SUPPLY: Performed by: ORTHOPAEDIC SURGERY

## 2020-09-23 PROCEDURE — 29888 ARTHRS AID ACL RPR/AGMNTJ: CPT | Mod: RT,,, | Performed by: ORTHOPAEDIC SURGERY

## 2020-09-23 PROCEDURE — C1713 ANCHOR/SCREW BN/BN,TIS/BN: HCPCS | Performed by: ORTHOPAEDIC SURGERY

## 2020-09-23 PROCEDURE — 71000033 HC RECOVERY, INTIAL HOUR: Performed by: ORTHOPAEDIC SURGERY

## 2020-09-23 PROCEDURE — 36000710: Performed by: ORTHOPAEDIC SURGERY

## 2020-09-23 PROCEDURE — D9220A PRA ANESTHESIA: ICD-10-PCS | Mod: CRNA,,, | Performed by: STUDENT IN AN ORGANIZED HEALTH CARE EDUCATION/TRAINING PROGRAM

## 2020-09-23 PROCEDURE — 63600175 PHARM REV CODE 636 W HCPCS: Performed by: STUDENT IN AN ORGANIZED HEALTH CARE EDUCATION/TRAINING PROGRAM

## 2020-09-23 PROCEDURE — 25000003 PHARM REV CODE 250: Performed by: NURSE ANESTHETIST, CERTIFIED REGISTERED

## 2020-09-23 PROCEDURE — 25000242 PHARM REV CODE 250 ALT 637 W/ HCPCS: Performed by: NURSE ANESTHETIST, CERTIFIED REGISTERED

## 2020-09-23 PROCEDURE — 37000008 HC ANESTHESIA 1ST 15 MINUTES: Performed by: ORTHOPAEDIC SURGERY

## 2020-09-23 PROCEDURE — 71000039 HC RECOVERY, EACH ADD'L HOUR: Performed by: ORTHOPAEDIC SURGERY

## 2020-09-23 PROCEDURE — 25000003 PHARM REV CODE 250: Performed by: ANESTHESIOLOGY

## 2020-09-23 PROCEDURE — 29888 PR KNEE SCOPE,AID ANT CRUCIATE REPAIR: ICD-10-PCS | Mod: RT,,, | Performed by: ORTHOPAEDIC SURGERY

## 2020-09-23 PROCEDURE — 36000711: Performed by: ORTHOPAEDIC SURGERY

## 2020-09-23 DEVICE — TENDON ANTERIOR TIBIALIS FROZE: Type: IMPLANTABLE DEVICE | Site: KNEE | Status: FUNCTIONAL

## 2020-09-23 DEVICE — IMPLANTABLE DEVICE: Type: IMPLANTABLE DEVICE | Site: KNEE | Status: FUNCTIONAL

## 2020-09-23 DEVICE — SCREW FASTTHREAD INTRF 9X20MM: Type: IMPLANTABLE DEVICE | Site: KNEE | Status: FUNCTIONAL

## 2020-09-23 DEVICE — KIT FIXATION ACL TIGHTROPE: Type: IMPLANTABLE DEVICE | Site: KNEE | Status: FUNCTIONAL

## 2020-09-23 RX ORDER — HYDROMORPHONE HYDROCHLORIDE 2 MG/ML
0.2 INJECTION, SOLUTION INTRAMUSCULAR; INTRAVENOUS; SUBCUTANEOUS EVERY 5 MIN PRN
Status: DISCONTINUED | OUTPATIENT
Start: 2020-09-23 | End: 2020-09-23 | Stop reason: HOSPADM

## 2020-09-23 RX ORDER — SODIUM CHLORIDE 0.9 % (FLUSH) 0.9 %
10 SYRINGE (ML) INJECTION
Status: DISCONTINUED | OUTPATIENT
Start: 2020-09-23 | End: 2020-09-23 | Stop reason: HOSPADM

## 2020-09-23 RX ORDER — ONDANSETRON 8 MG/1
8 TABLET, ORALLY DISINTEGRATING ORAL EVERY 8 HOURS PRN
Qty: 10 TABLET | Refills: 0 | Status: SHIPPED | OUTPATIENT
Start: 2020-09-23 | End: 2021-01-25 | Stop reason: CLARIF

## 2020-09-23 RX ORDER — OXYCODONE AND ACETAMINOPHEN 5; 325 MG/1; MG/1
1 TABLET ORAL EVERY 6 HOURS PRN
Qty: 20 TABLET | Refills: 0 | Status: SHIPPED | OUTPATIENT
Start: 2020-09-23 | End: 2020-11-05

## 2020-09-23 RX ORDER — MEPERIDINE HYDROCHLORIDE 50 MG/ML
INJECTION INTRAMUSCULAR; INTRAVENOUS; SUBCUTANEOUS
Status: DISCONTINUED | OUTPATIENT
Start: 2020-09-23 | End: 2020-09-23

## 2020-09-23 RX ORDER — EPINEPHRINE 0.1 MG/ML
INJECTION INTRAVENOUS
Status: DISCONTINUED | OUTPATIENT
Start: 2020-09-23 | End: 2020-09-23 | Stop reason: HOSPADM

## 2020-09-23 RX ORDER — SODIUM CHLORIDE, SODIUM LACTATE, POTASSIUM CHLORIDE, CALCIUM CHLORIDE 600; 310; 30; 20 MG/100ML; MG/100ML; MG/100ML; MG/100ML
INJECTION, SOLUTION INTRAVENOUS CONTINUOUS
Status: DISCONTINUED | OUTPATIENT
Start: 2020-09-24 | End: 2020-09-23 | Stop reason: HOSPADM

## 2020-09-23 RX ORDER — ASPIRIN 325 MG
325 TABLET ORAL DAILY
Qty: 14 TABLET | Refills: 0 | Status: SHIPPED | OUTPATIENT
Start: 2020-09-23 | End: 2020-11-05

## 2020-09-23 RX ORDER — LIDOCAINE HYDROCHLORIDE 10 MG/ML
1 INJECTION, SOLUTION EPIDURAL; INFILTRATION; INTRACAUDAL; PERINEURAL ONCE
Status: COMPLETED | OUTPATIENT
Start: 2020-09-24 | End: 2020-09-23

## 2020-09-23 RX ORDER — SODIUM CHLORIDE 9 MG/ML
INJECTION, SOLUTION INTRAVENOUS CONTINUOUS
Status: DISCONTINUED | OUTPATIENT
Start: 2020-09-23 | End: 2020-09-23 | Stop reason: HOSPADM

## 2020-09-23 RX ORDER — MIDAZOLAM HYDROCHLORIDE 1 MG/ML
INJECTION, SOLUTION INTRAMUSCULAR; INTRAVENOUS
Status: DISCONTINUED | OUTPATIENT
Start: 2020-09-23 | End: 2020-09-23

## 2020-09-23 RX ORDER — PROPOFOL 10 MG/ML
VIAL (ML) INTRAVENOUS
Status: DISCONTINUED | OUTPATIENT
Start: 2020-09-23 | End: 2020-09-23

## 2020-09-23 RX ORDER — FENTANYL CITRATE 50 UG/ML
INJECTION, SOLUTION INTRAMUSCULAR; INTRAVENOUS
Status: DISCONTINUED | OUTPATIENT
Start: 2020-09-23 | End: 2020-09-23

## 2020-09-23 RX ORDER — LIDOCAINE HCL/PF 100 MG/5ML
SYRINGE (ML) INTRAVENOUS
Status: DISCONTINUED | OUTPATIENT
Start: 2020-09-23 | End: 2020-09-23

## 2020-09-23 RX ORDER — MEPERIDINE HYDROCHLORIDE 50 MG/ML
INJECTION INTRAMUSCULAR; INTRAVENOUS; SUBCUTANEOUS
Status: COMPLETED
Start: 2020-09-23 | End: 2020-09-23

## 2020-09-23 RX ORDER — ALBUTEROL SULFATE 90 UG/1
AEROSOL, METERED RESPIRATORY (INHALATION)
Status: DISCONTINUED | OUTPATIENT
Start: 2020-09-23 | End: 2020-09-23

## 2020-09-23 RX ORDER — CEFAZOLIN SODIUM 2 G/50ML
2 SOLUTION INTRAVENOUS
Status: COMPLETED | OUTPATIENT
Start: 2020-09-23 | End: 2020-09-23

## 2020-09-23 RX ADMIN — HYDROMORPHONE HYDROCHLORIDE 0.2 MG: 2 INJECTION, SOLUTION INTRAMUSCULAR; INTRAVENOUS; SUBCUTANEOUS at 07:09

## 2020-09-23 RX ADMIN — FENTANYL CITRATE 50 MCG: 50 INJECTION INTRAMUSCULAR; INTRAVENOUS at 02:09

## 2020-09-23 RX ADMIN — MEPERIDINE HYDROCHLORIDE 12.5 MG: 50 INJECTION, SOLUTION INTRAMUSCULAR; INTRAVENOUS; SUBCUTANEOUS at 06:09

## 2020-09-23 RX ADMIN — CEFAZOLIN SODIUM 2 G: 2 SOLUTION INTRAVENOUS at 02:09

## 2020-09-23 RX ADMIN — FENTANYL CITRATE 25 MCG: 50 INJECTION INTRAMUSCULAR; INTRAVENOUS at 05:09

## 2020-09-23 RX ADMIN — ALBUTEROL SULFATE 4 PUFF: 90 AEROSOL, METERED RESPIRATORY (INHALATION) at 06:09

## 2020-09-23 RX ADMIN — MIDAZOLAM HYDROCHLORIDE 2 MG: 1 INJECTION, SOLUTION INTRAMUSCULAR; INTRAVENOUS at 02:09

## 2020-09-23 RX ADMIN — SODIUM CHLORIDE, SODIUM LACTATE, POTASSIUM CHLORIDE, AND CALCIUM CHLORIDE: .6; .31; .03; .02 INJECTION, SOLUTION INTRAVENOUS at 10:09

## 2020-09-23 RX ADMIN — PROPOFOL 200 MG: 10 INJECTION, EMULSION INTRAVENOUS at 02:09

## 2020-09-23 RX ADMIN — FENTANYL CITRATE 50 MCG: 50 INJECTION INTRAMUSCULAR; INTRAVENOUS at 03:09

## 2020-09-23 RX ADMIN — ALBUTEROL SULFATE 2 PUFF: 90 AEROSOL, METERED RESPIRATORY (INHALATION) at 02:09

## 2020-09-23 RX ADMIN — FENTANYL CITRATE 25 MCG: 50 INJECTION INTRAMUSCULAR; INTRAVENOUS at 06:09

## 2020-09-23 RX ADMIN — SODIUM CHLORIDE, SODIUM LACTATE, POTASSIUM CHLORIDE, AND CALCIUM CHLORIDE: .6; .31; .03; .02 INJECTION, SOLUTION INTRAVENOUS at 04:09

## 2020-09-23 RX ADMIN — LIDOCAINE HYDROCHLORIDE 100 MG: 20 INJECTION, SOLUTION INTRAVENOUS at 02:09

## 2020-09-23 RX ADMIN — PROPOFOL 20 MG: 10 INJECTION, EMULSION INTRAVENOUS at 03:09

## 2020-09-23 RX ADMIN — PROPOFOL 20 MG: 10 INJECTION, EMULSION INTRAVENOUS at 06:09

## 2020-09-23 RX ADMIN — FENTANYL CITRATE 50 MCG: 50 INJECTION INTRAMUSCULAR; INTRAVENOUS at 04:09

## 2020-09-23 RX ADMIN — LIDOCAINE HYDROCHLORIDE 0.1 MG: 10 INJECTION, SOLUTION EPIDURAL; INFILTRATION; INTRACAUDAL; PERINEURAL at 10:09

## 2020-09-23 NOTE — DISCHARGE INSTRUCTIONS
Meniscus repair was done - keep knee brace on until seen in clinic   Non weight bearing with crutches   Keep knee brace locked in extension when walking  No flexion past 90 degrees    ACL Reconstruction Post-Operative Instructions    Ines Marina MD  Clinic phone number: [phone]    Call your surgeon for:    Uncontrolled nausea or vomiting    Persistent numbness or tingling in the arm after the block has worn off    Fevers greater than 101.4    Redness surrounding the incision (swelling is normal)    Shortness of breath/difficulty breathing    Chest pain    Any other concerns you may have     Pain:    You will be sent home from the hospital with a pain medication e-prescribed to your pharmacy    The anesthesiologist will perform a block which will numb your leg for several hours after the surgery. When you start to feel sensation return (in the form of pain or tingling) start taking your pain medication. The medicine takes about an hour to work do you do not want to wait until the block has fully worn off.    The first two days will be the most painful. After the pain starts to lessen you should start weaning the narcotic medication    An anti-nausea medicine will be sent with the pain medication as many people do experience nausea as a side effect    An over the counter stool softener such as Miralax can be taken to avoid constipation as a side effect of the pain medication    The pain medication is intended to make your pain more tolerable. It is not intended to relieve 100% of pain.    Do not drive while taking pain medications    Avoid taking other sedative medications such as sleeping pills while taking narcotics.    Ice    You will be sent home with a polar care wrap as part of your dressings. It will be hooked up to a [employer] ice pump.    For the first 4 days, use the machine for 30 minutes on and then 30 minutes off. Turning the unit off is especially important when your arm is  numb from the block to avoid thermal injury to the skin. After day 5 use as needed for pain control.    To fill the unit:    Unlock handles and remove lid    Fill with cold water to the indicated line and then with ice    Replace lid and lock    Keep unit upright    To use the polar care:    Before turning on make sure the pad is connected    Plug in -- this will turn the unit on    Refilling:    Unplug unit to turn off    Disconnect the wrap by pushing down on the metal tab and gently pulling the connectors apart    Drain unit and refill    Reattach connector by pushing down on metal piece and pushing the connectors together    After the dressing is removed, keep the blue towel between the skin and the cooling pad so the pad does not directly contact your skin. You will be sent home with instructions of how the pad should be re-applied after your dressing change.       Dressing care:    Leave your dressing on for three days. You may remove it and then recover each incision with a large band-aid    It is normal for the dressing to have a large amount of fluid on it. This will be mixed with some blood. Fluid is used during the surgery and will slowly leak out of the wounds over the first few days.    Do not get the dressing or incisions wet. You will need to carefully shower or take a sponge bath to avoid wetting the dressing. To wash under your arm safely, bend forward slightly to allow the arm to gently move forward so you can clean under your arm without raising it to the side       Brace Instructions:    Wear brace at all times until instructed otherwise.     Keep the pillow between the sling and your body in place as well    The brace may be removed for hygiene    There is a drop lock located on the side of the brace. When the knee is fully extended this can be used to lock the knee in extension for walking. It can be unlocked when not walking.      Post op activity and precautions:  "   Keep brace on as described above    Avoid any activities or places that may result in a fall (walking on uneven ground, crowded places, etc)    Weight bearing: non weight bearing, Will be advanced when I see you in clinic   Walk with crutches with the knee brace locked in extension.  The surgery can cause temporary quad weakness- walking without the brace in extension or without crutches can lead to falls   Avoid active extension of the knee - you can use the non operative foot to help passively extend the knee when performing motion exercises or perform motion exercises while laying on your stomach.  Active flexion is allowed.    You may start squeezing the quads - think about activating them or telling them to wake up.    It is extremely important to get back full knee extension early in your recovery.  This helps restore quadriceps function and prevents issues with long term stiffness.     When laying or sitting down, place a pillow or rolled towel underneath your heel NOT under your knee.    Physical Therapy    Physical therapy will start at 1-2 weeks post op       Follow up appointment    You will be seen in clinic in 2 weeks following your procedure. This will be made before you leave the hospital    Sutures will be removed    More detailed activity instructions will be given at that time         Leave your dressing and brace on until you come back to follow-up next week. Try to work on "pushing" the leg fully straight in the brace.  Also try to "wake up" your quadriceps by performing straight leg raises.    SPORTS MEDICINE                                 Care of the Knee after Arthroscopic Surgery   Post-Op Care  Recovery from knee arthroscopy is much faster than recovery from traditional open knee surgery. Still, it is important to follow your orthopaedic surgeon's instructions carefully after you return home. You should ask someone to check on you that evening. The protocols described here " "are general in nature. Every person and every surgery is different so the information given here is for guidance only. If you have questions you should contact us.   Day of Surgery  When you are discharged from the ambulatory surgery unit you will be taken to the car in a wheel chair.   The leg may be in an ace wrap or a brace to protect any surgical repair that was performed at the time of surgery. Do not remove the brace unless advised to do so by your doctor. You will have crutches or a walker to help you get around.  Begin taking liquids and food as soon as you can. You should always eat some solid food, a sandwich or light meal, a little while before taking your pain meds.     In addition to the pain relievers, you should take one aspirin daily for 2 weeks following the surgery. You may substitute Bufferin, Ascriptin, or some other form of aspirin if you wish. The purpose of this is not to relieve pain, but to reduce the chance of venous thrombosis, "blood clot.      Day 3  Things are much the same on the third day after your surgery. Usually you have less pain and feel like doing more.  If you feel that the pain medication you were given after surgery is stronger than you really need you can reduce the dose, take it less frequently or switch to ibuprofen or Tylenol.  If you have a pain pump in the thigh, it should be removed at the same time the dressing is removed at 72 hours.  To remove the pump simply pull the catheter out of the skin incision.  Day 7-10  We will see you back after your surgery and remove your stitches. We will review with you what was done in surgery and will talk about rehab and answer any questions you may have. The next follow up after the sutures are out will be between two to four weeks after your surgery.           Icing  In general the knee should be iced for 72 hours after surgery. After this period, if you experience swelling and/or discomfort in your knee, you may apply ice to " "relieve these symptoms. Do not ice your knee more than 20 minutes at a time, do not place ice directly on your skin, and avoid getting your surgical sites wet. Finally, let your knee "warm-up" before re-icing it.   Post-operative exercises  Your postoperative therapy begins on the day of surgery. Initially you should perform ankle pumps (up and down motion), straight leg raises, thigh contractions, and hamstring contractions. There is no limit to the amount of these exercises you may do. No therapy should cause sharp pain. Stop all activities that cause this kind of pain. Most patients will be enrolled in a formal physical therapy program. This may occur prior to your surgery or at the first post-operative visit. The following exercises can begin immediately following surgery.  Straight Leg Raises, 5 Sets, 10 Repetitions - Lie on your back, with your uninvolved knee bent. Straighten your other knee with a quadriceps muscle contraction. Now, slowly raise your leg until your foot is about 12 inches from the floor. Slowly lower it to the floor and relax. Perform 5 sets of 10 repetitions.  Advanced: Before starting, add weights to your ankle, starting with 1 pound of weight and building up to a maximum of 5 pounds of weight over 4 weeks.  Hamstring Contraction, 10 Repetitions - No movement should occur in this exercise.  Lie or sit with your knees bent to about 10 degrees. Pull your heel into the floor, tightening the muscles on the back of your thigh. Hold 5 seconds, and then relax. Repeat 10 times.          SPORTS MEDICINE  Common Concerns and Frequently Asked Questions      Although uncommon, complications do occur occasionally during or following arthroscopy. Infection, phlebitis (blood clots of a vein), excessive swelling or bleeding, are the most common complications, but occur in far less than 1 percent of all arthroscopic procedures. You will experience bruising and/or swelling of the knee region. The cause of " "this is bleeding from the bone and soft tissue in the area underneath the skin (which is cut during the surgery). Ice may be applied to lessen these symptoms (see above).     1. Swelling in the foot is normal.  2. Sometimes after coming from a sitting down or lying position to a standing position you may feel a sudden rush of pain and swelling in your knee. If the pain is unbearable, ice your knee (see above).   3. Fluid draining from the wound is normal. This is the fluid used during surgery to distend the joint.  4. Numbness around the knee is normal. This is due to local anesthetic used for post-operative pain control.  5. In addition to the pain relievers, you should take one aspirin daily for 2 weeks following the surgery. You may substitute Bufferin, Ascriptin, or some other form of aspirin if you wish. The purpose of this is not to relieve pain, but to reduce the chance of venous thrombosis, "blood clot."   Call your doctor if:   1. You experience any oozing or redness of the wound, fevers (greater than 101.3 degrees), or chills.   2. You experience any difficulty in breathing or heaviness in the chest.   If you do not understand any of the above information, you still have questions, and/or are concerned about any complications, please call my office as soon as possible.    "

## 2020-09-23 NOTE — TRANSFER OF CARE
Anesthesia Transfer of Care Note    Patient: Solomon Busby Jr.    Procedure(s) Performed: Procedure(s) (LRB):  RECONSTRUCTION, KNEE, ACL, ARTHROSCOPIC (Right)  REPAIR, MENISCUS, KNEE (Right)    Patient location: PACU    Anesthesia Type: general    Transport from OR: Transported from OR on room air with adequate spontaneous ventilation    Post pain: adequate analgesia    Post assessment: no apparent anesthetic complications and tolerated procedure well    Post vital signs: stable    Level of consciousness: awake    Nausea/Vomiting: no nausea/vomiting    Complications: none    Transfer of care protocol was followed      Last vitals:   Visit Vitals  BP (!) 168/103 (BP Location: Right arm, Patient Position: Lying)   Pulse 101   Temp 37.2 °C (98.9 °F) (Oral)   Resp 20   Wt 89.9 kg (198 lb 3.1 oz)   SpO2 100%   BMI 31.04 kg/m²

## 2020-09-23 NOTE — PLAN OF CARE
Preop plan of care reviewed.  Questions encouraged and questions answered. Pt verbalized readiness to proceed. Reports does not have crutches at home but he has walked on them before so he is comfortable with use

## 2020-09-23 NOTE — BRIEF OP NOTE
[facility]  Brief Operative Note    Surgery Date: 9/23/2020     Surgeon(s) and Role:     * Ines Marina MD - Primary    Assisting Surgeon: None    Pre-op Diagnosis:  Complete tear of anterior cruciate ligament of right knee, initial encounter [S83.511A]  Acute medial meniscus tear of right knee, initial encounter [S83.241A]  Peripheral tear of lateral meniscus of right knee as current injury, initial encounter [S83.261A]    Post-op Diagnosis:  Post-Op Diagnosis Codes:     * Complete tear of anterior cruciate ligament of right knee, initial encounter [S83.511A]     * Acute medial meniscus tear of right knee, initial encounter [S83.241A]     * Peripheral tear of lateral meniscus of right knee as current injury, initial encounter [S83.261A]    Procedure(s) (LRB):  RECONSTRUCTION, KNEE, ACL, ARTHROSCOPIC (Right)  REPAIR, MENISCUS, KNEE (Right)    Anesthesia: General    Description of the findings of the procedure(s): left knee ACL tear, medial meniscal tear    Estimated Blood Loss: 20 mL         Specimens:   Specimen (12h ago, onward)    None            Discharge Note    OUTCOME: Patient tolerated treatment/procedure well without complication and is now ready for discharge.    DISPOSITION: Home or Self Care    FINAL DIAGNOSIS:  Complete tear of right ACL, initial encounter    FOLLOWUP: In clinic    DISCHARGE INSTRUCTIONS:    Discharge Procedure Orders   Diet Adult Regular     Ice to affected area     Keep surgical extremity elevated     Other restrictions (specify):   Order Comments: Remain in brace     Leave dressing on - Keep it clean, dry, and intact until clinic visit     Weight bearing restrictions (specify):   Order Comments: NWB RLE for 2 weeks

## 2020-09-23 NOTE — INTERVAL H&P NOTE
The patient has been examined and the H&P has been reviewed:    I concur with the findings and no changes have occurred since H&P was written.    Surgery risks, benefits and alternative options discussed and understood by patient/family.          Active Hospital Problems    Diagnosis  POA    Complete tear of right ACL, initial encounter [S82.577A]  Yes      Resolved Hospital Problems   No resolved problems to display.

## 2020-09-23 NOTE — OP NOTE
[facility]   OPERATIVE REPORT   ORTHOPAEDIC SURGERY   PROVIDER: DR. Ines Marina     PATIENT INFORMATION   Solomon Busby Jr. 35 y.o. male 1985   MRN: [id number]   LOCATION: [facility]     DATE OF PROCEDURE: 9/23/2020     PREOPERATIVE DIAGNOSES:   Complete tear of anterior cruciate ligament of right knee, initial encounter [S83.511A]  Acute medial meniscus tear of right knee, initial encounter [S83.241A]  Peripheral tear of lateral meniscus of right knee as current injury, initial encounter [S83.261A]    POSTOPERATIVE DIAGNOSES:   Complete tear of anterior cruciate ligament of right knee, initial encounter [S83.511A]  Acute medial meniscus tear of right knee, initial encounter [S83.241A]  Peripheral tear of lateral meniscus of right knee as current injury, initial encounter [S83.261A]    PROCEDURES PERFORMED:   Procedure(s) (LRB):  RECONSTRUCTION, KNEE, ACL, ARTHROSCOPIC (Right)  REPAIR, MENISCUS, KNEE (Right)      Surgeon(s) and Role:     * Ines Marina MD - Primary    ANESTHESIA: General    ESTIMATED BLOOD LOSS: less than 100 mL    IMPLANTS:   Implant Name Type Inv. Item Serial No.  Lot No. LRB No. Used Action   [id number]  TENDON ANTERIOR TIBIALIS FROZE  MUSCULOSKELETAL TRANSPLANT FND  Right 1 Implanted   KIT FIXATION ACL TIGHTROPE - [id number]  KIT FIXATION ACL TIGHTROPE  [employer]  Right 1 Implanted   FIBERSTITCH IMPLANT, CURVED WITH TWO POLYESTER IMPLANTS AND 2-0 FIBERWIRE      Right 1 Implanted   PEEK INTERFERENCE SCREW    [employer]  Right 1 Implanted   SCREW FASTTHREAD INTRF 9X20MM - [id number]  SCREW FASTTHREAD INTRF 9X20MM  [employer]X  Right 1 Implanted        FINDINGS: ACL tear, medial and lateral meniscus tears      SPECIMENS:   Specimen (12h ago, onward)    None          COMPLICATIONS Insufficient graft size requiring use of allograft after autograft harvest      INTRAOPERATIVE COUNTS: Correct.     PROPHYLACTIC IV ANTIBIOTICS: Given per [facility] Protocol.    INDICATIONS  FOR PROCEDURE: The details of the above stated procedure were discussed at length to include the expected postop rehab and recovery course. Outlined risks and potential complications include but are not limited to infection, stiffness, graft tear, contralateral knee ligament tear, fracture, neurovascular injury, blood clot formation, hardware failure, instability, and inability to return to previous level of activity. Prior to proceeding with surgery, the patient participated in physical therapy to regain quadriceps function, allow time to decrease swelling, and reconstitute range of motion.     DETAILS OF THE PROCEDURE: After permit was obtained for the above procedure and regional block was performed, the patient was brought back to the operating room and placed under general anesthesia.  IV antibiotics were administered prior to incision.      Examination under anesthesia of the right knee demonstrated: passive range of motion 0 to 130 degrees, Lachman grade 1A, negative posterior drawer, 1-2+ pivot shift, stable to varus and valgus stress at 0 and 30, negative supine dial test at 30 and 90.     The operative knee and leg were prescrubbed, sterilely prepped and draped in routine fashion.      Verbal timeout was performed to confirm the patient's identity, correct operative site and planned operative procedure.    [place] of the quadriceps  tendon graft was performed first with a 2 cm incision over the superior pole of the patella. The Arthrex minimally invasive harvester was used to score a 7 mm cut in the tendon with visualization with the arthroscope. The tendon was release from the proximal pole of the patella and the distal aspect was tagged with a suture. It was harvested with a cigar cutter tendon stripper to a length of 75 mm. Unfortunately the graft was no of sufficient thickness and only measured to 5 mm.  It could not be used and allograft anterior tibialis tendon was used instead.      Diagnostic  arthroscopy was performed through standard anterolateral and anteromedial scope portals. No chondral defects or wear were encountered.  There was a 4 mm stable partial thickness vertical tear of the lateral meniscus which was left in situ. The meniscus was otherwise intact and stable.  There was a 8 mm vertical tear of the posterior horn of the medial meniscus which was not stable.  It was repaired with an Arthrex fiberstitch suture and was found to be well reduced and stable.     The ACL was found to be completely torn. The PCL was intact.      Next, the ACL reconstruction was undertaken. The ACL stump was removed with thermal ablation and shaver and anatomic bony landmarks were marked for the placement of the femoral and tibial sockets. The graft was measured to be 9.5 mm on the femoral side and 9 mm on the tibial side.  The tibial tunnel was created just anterior to the medial eminence and in line with the posterior aspect of the anterior horn of the lateral meniscus with a 9 mm reamer. The femoral socket was created at the inferior portion of the bifurcate ridge of the lateral femoral wall with a size 9.5 mm FlipCutter. Bony debris was removed and the edges of socket apertures were smoothed. A shuttle suture was passed through both tunnels.  The suture was passed and the button was flipped under arthroscopic visualization. It was noted to be well fixed with traction on the graft sutures. The graft was then passed through the tibial and femoral tunnels again under direct arthroscopic visualization.  Approximately 25 mm of the graft was secured into the femoral tunnel.    With tension on the graft, the knee was placed through several flexion-extension cycles to eliminate any graft settling or excursion.  The graft was re-tensioned with the knee in 15 degrees of flexion with a moderate posterior drawer forces  and secured with a 9 mm interference screw. Final images of the ACL graft were obtained, revealing no  lateral wall or roof impingement of the graft at the notch through range of motion. Stability of the ACL graft was assessed and found to be normalized at grade 0 lachman and grade 0 pivot shift.   The wounds were all closed in layers per usual. Dressings were applied and a brace placed. There were no apparent complications. The patient was awakened and taken to recovery room in satisfactory condition.     Soft muscle compartments and a 2+ DP pulse was confirmed at the conclusion of the case. Sterile dressings were applied. A long leg hinged knee brace was placed, set from  0 degrees of hyperextension to 90 degrees and locked in full extension at the conclusion.      The patient was extubated and taken to the PACU in stable condition.    A [facility] cold therapy unit was also applied in the PACU     POSTOPERATIVE PLAN: Patient will be non weight bearing for two weeks - weight bearing will be progressed with the brace locked in extension. Range of motion may be full. Plan to follow an ACL allograft rehab protocol. Initial goals include maintenance of full knee extension, regaining flexion, swelling control, and quadriceps activation exercises.

## 2020-09-23 NOTE — ANESTHESIA PREPROCEDURE EVALUATION
09/23/2020  Solomon Busby Jr. is a 35 y.o., male.    Anesthesia Evaluation     I have reviewed the Nursing Notes.       Review of Systems  Anesthesia Hx:  No problems with previous Anesthesia   Social:  Non-Smoker    Cardiovascular:  Cardiovascular Normal Exercise tolerance: good     Pulmonary:   Asthma asymptomatic and moderate Denies Shortness of breath.  Denies Recent URI.  Denies Sleep Apnea. Last ED visit for asthma was [facility]   Renal/:  Renal/ Normal     Hepatic/GI:  Hepatic/GI Normal    Neurological:   Denies CVA. Headaches Denies Seizures.    Endocrine:  Endocrine Normal        Physical Exam  General:  Obesity    Airway/Jaw/Neck:  AIRWAY FINDINGS: Normal      Chest/Lungs:  Chest/Lungs Clear    Heart/Vascular:  Heart Findings: Normal       Mental Status:  Mental Status Findings: Normal        Anesthesia Plan  Type of Anesthesia, risks & benefits discussed:  Anesthesia Type:  general  Patient's Preference:   Intra-op Monitoring Plan: standard ASA monitors  Intra-op Monitoring Plan Comments:   Post Op Pain Control Plan:   Post Op Pain Control Plan Comments:   Induction:   IV  Beta Blocker:  Patient is not currently on a Beta-Blocker (No further documentation required).       Informed Consent: Patient understands risks and agrees with Anesthesia plan.  Questions answered. Anesthesia consent signed with patient.  ASA Score: 2     Day of Surgery Review of History & Physical:  There are no significant changes.  H&P update referred to the provider.         Ready For Surgery From Anesthesia Perspective.

## 2020-09-24 NOTE — PLAN OF CARE
Pt arrived to PACU via gurney, placed on 10L via SFM, VSS and WNL, Dressing is gauze and elastic bandage c/d/i with leg brace. Dr Marina contacted for clarification about ice pack vs polar care, Dr Marina stated that she wanted polar care, arrangements initiated for a unit to be delivered to pt's house, Dr Marina is aware of this plan. Dr Marina placed polar care wrap on pt's leg and reviewed plan of care with pt. pt states no nausea able to eat ice chips and drink water without issue, pt voided 480ml clear yellow urine. pain treated per MAR and is currently 3/10 and tolerable pt refused additional offered medication.       Patient: Solomon Smithmarilu Mcmahon    Procedure(s) Performed: Procedure(s) (LRB):  RECONSTRUCTION, KNEE, ACL, ARTHROSCOPIC (Right)  REPAIR, MENISCUS, KNEE (Right)    Patient location: PACU    Anesthesia Type: general      Post pain: Adequate analgesia    Post assessment: no apparent anesthetic complications    Last vitals:   Visit Vitals  BP (!) 148/81   Pulse 106   Temp 98.9 °F (37.2 °C) (Oral)   Resp 17   Wt 89.9 kg (198 lb 3.1 oz)   SpO2 97%   BMI 31.04 kg/m²       Post vital signs: stable    Level of consciousness: awake, alert  and oriented    Nausea/Vomiting: no nausea/no vomiting    Complications: none      Last vitals:   Visit Vitals  BP (!) 148/81   Pulse 106   Temp 98.9 °F (37.2 °C) (Oral)   Resp 17   Wt 89.9 kg (198 lb 3.1 oz)   SpO2 97%   BMI 31.04 kg/m²

## 2020-09-25 ENCOUNTER — OFFICE VISIT (OUTPATIENT)
Dept: ORTHOPEDICS | Facility: CLINIC | Age: 35
End: 2020-09-25
Payer: COMMERCIAL

## 2020-09-25 VITALS
OXYGEN SATURATION: 97 % | BODY MASS INDEX: 31.08 KG/M2 | DIASTOLIC BLOOD PRESSURE: 80 MMHG | HEART RATE: 94 BPM | WEIGHT: 198 LBS | RESPIRATION RATE: 17 BRPM | TEMPERATURE: 99 F | HEIGHT: 67 IN | SYSTOLIC BLOOD PRESSURE: 130 MMHG

## 2020-09-25 DIAGNOSIS — S83.241A ACUTE MEDIAL MENISCUS TEAR OF RIGHT KNEE, INITIAL ENCOUNTER: ICD-10-CM

## 2020-09-25 DIAGNOSIS — S83.511A COMPLETE TEAR OF ANTERIOR CRUCIATE LIGAMENT OF RIGHT KNEE, INITIAL ENCOUNTER: Primary | ICD-10-CM

## 2020-09-25 PROCEDURE — 99024 POSTOP FOLLOW-UP VISIT: CPT | Mod: S$GLB,,, | Performed by: ORTHOPAEDIC SURGERY

## 2020-09-25 PROCEDURE — 99999 PR PBB SHADOW E&M-EST. PATIENT-LVL III: CPT | Mod: PBBFAC,,, | Performed by: ORTHOPAEDIC SURGERY

## 2020-09-25 PROCEDURE — 99999 PR PBB SHADOW E&M-EST. PATIENT-LVL III: ICD-10-PCS | Mod: PBBFAC,,, | Performed by: ORTHOPAEDIC SURGERY

## 2020-09-25 PROCEDURE — 99024 PR POST-OP FOLLOW-UP VISIT: ICD-10-PCS | Mod: S$GLB,,, | Performed by: ORTHOPAEDIC SURGERY

## 2020-09-25 NOTE — PROGRESS NOTES
Postoperative Visit    History of Present Illness:   Solomon is 2 days status post right knee arthroscopy with allograft ACL reconstruction and medial meniscus repair (DOS-09/23/2020)  He called earlier complaining of numbness in the foot and was asked to come in for an examination.  He states he did not note this numbness the night after the surgery after the block was worn off and noticed that sometime on postop day 1.  He has been following instructions has not been moving around very much    Physical Examination:    NAD  right lower extremity:  Incisions over the knee are clean and dry  Lachmann stable   No erythema, drainage or other signs of infection. Appropriate post operative swelling is present  Dressings were taken down and left off about 10 min with improvement in the foot numbness.  He localizes the numbness in the entire foot, it is not isolated to a single nerve distribution  Ltsi s/s/sp/dp/t  + ehl/fhl/ta/gs  2+ DP    Assessment/Plan:  35 y.o. male   2 days status post right knee arthroscopy with allograft ACL reconstruction and medial meniscus repair (DOS-09/23/2020)      Plan  1. Will work on getting MRI earlier in to physical therapy.  I would like for him to be seen next week ideally  2. I think the numbness is probably due to the dressings and positioning.  He does not seem to have a nerve injury.    3. Dressings were replaced along with a polar Care wrap and knee brace  4. Continue to follow previous instructions and follow up as previously scheduled    All questions were answered in detail. The patient is in full agreement with the treatment plan and will proceed accordingly.

## 2020-09-28 NOTE — ANESTHESIA POSTPROCEDURE EVALUATION
Anesthesia Post Evaluation    Patient: Solomon Busby Jr.    Procedure(s) Performed: Procedure(s) (LRB):  RECONSTRUCTION, KNEE, ACL, ARTHROSCOPIC (Right)  REPAIR, MENISCUS, KNEE (Right)    Final Anesthesia Type: general    Patient location during evaluation: PACU  Patient participation: Yes- Able to Participate  Level of consciousness: awake and alert  Post-procedure vital signs: reviewed and stable  Pain management: adequate  Airway patency: patent    PONV status at discharge: No PONV  Anesthetic complications: no      Cardiovascular status: blood pressure returned to baseline and hemodynamically stable  Respiratory status: unassisted and spontaneous ventilation  Hydration status: euvolemic  Follow-up not needed.          Vitals Value Taken Time   /80 09/25/20 1106   Temp 37.2 °C (98.9 °F) 09/25/20 1106   Pulse 94 09/25/20 1106   Resp 17 09/25/20 1106   SpO2 97 % 09/25/20 1106         Event Time   Out of Recovery 20:31:45         Pain/Viktoriya Score: No data recorded

## 2020-09-29 ENCOUNTER — PATIENT MESSAGE (OUTPATIENT)
Dept: ORTHOPEDICS | Facility: CLINIC | Age: 35
End: 2020-09-29

## 2020-09-30 ENCOUNTER — CLINICAL SUPPORT (OUTPATIENT)
Dept: REHABILITATION | Facility: HOSPITAL | Age: 35
End: 2020-09-30
Attending: ORTHOPAEDIC SURGERY
Payer: COMMERCIAL

## 2020-09-30 ENCOUNTER — OFFICE VISIT (OUTPATIENT)
Dept: ORTHOPEDICS | Facility: CLINIC | Age: 35
End: 2020-09-30
Payer: COMMERCIAL

## 2020-09-30 VITALS
TEMPERATURE: 99 F | HEART RATE: 86 BPM | WEIGHT: 198 LBS | SYSTOLIC BLOOD PRESSURE: 136 MMHG | RESPIRATION RATE: 17 BRPM | DIASTOLIC BLOOD PRESSURE: 94 MMHG | BODY MASS INDEX: 31.08 KG/M2 | HEIGHT: 67 IN

## 2020-09-30 DIAGNOSIS — M62.81 QUADRICEPS WEAKNESS: ICD-10-CM

## 2020-09-30 DIAGNOSIS — R26.2 DIFFICULTY IN WALKING: ICD-10-CM

## 2020-09-30 DIAGNOSIS — G89.18 POST-OPERATIVE PAIN: ICD-10-CM

## 2020-09-30 DIAGNOSIS — S83.511A COMPLETE TEAR OF ANTERIOR CRUCIATE LIGAMENT OF RIGHT KNEE, INITIAL ENCOUNTER: Primary | ICD-10-CM

## 2020-09-30 DIAGNOSIS — M25.661 DECREASED RANGE OF MOTION (ROM) OF RIGHT KNEE: Primary | ICD-10-CM

## 2020-09-30 DIAGNOSIS — M25.469 SWELLING OF KNEE: ICD-10-CM

## 2020-09-30 DIAGNOSIS — R68.89 DIFFICULTY PARTICIPATING IN SPORTING ACTIVITIES: ICD-10-CM

## 2020-09-30 PROCEDURE — 97112 NEUROMUSCULAR REEDUCATION: CPT | Mod: PN

## 2020-09-30 PROCEDURE — 99999 PR PBB SHADOW E&M-EST. PATIENT-LVL III: ICD-10-PCS | Mod: PBBFAC,,, | Performed by: ORTHOPAEDIC SURGERY

## 2020-09-30 PROCEDURE — 99999 PR PBB SHADOW E&M-EST. PATIENT-LVL III: CPT | Mod: PBBFAC,,, | Performed by: ORTHOPAEDIC SURGERY

## 2020-09-30 PROCEDURE — 97110 THERAPEUTIC EXERCISES: CPT | Mod: PN

## 2020-09-30 PROCEDURE — 97162 PT EVAL MOD COMPLEX 30 MIN: CPT | Mod: PN

## 2020-09-30 PROCEDURE — 99024 PR POST-OP FOLLOW-UP VISIT: ICD-10-PCS | Mod: S$GLB,,, | Performed by: ORTHOPAEDIC SURGERY

## 2020-09-30 PROCEDURE — 99024 POSTOP FOLLOW-UP VISIT: CPT | Mod: S$GLB,,, | Performed by: ORTHOPAEDIC SURGERY

## 2020-09-30 NOTE — PROGRESS NOTES
"Patient brought into clinic bc of complaint of new quad pain with redness  No fevers or chills  No drainage  This is new in last 24 h or so   Has pt today   No falls, new injury, pop  No knee pain     Exam:  Vitals:    09/30/20 1524   BP: (!) 136/94   Pulse: 86   Resp: 17   Temp: 99.3 °F (37.4 °C)   TempSrc: Oral   Weight: 89.8 kg (198 lb)   Height: 5' 7" (1.702 m)       RLE  Brace in place   Walking w brace locked in extension no crutches  Dressings taken down - no visible erythema. Mild warmth  Mildly ttp over lateral distal quad  No hematoma/fluid collection over quad tendon harvest site  Does not appear infected  No defect over quad   Incisions clean and dry     Plan:   Reviewed instructions - NWB x 2 weeks bc of meniscus repair  Keep PT appt  Keep previously schedule follow up - will see Freda   "

## 2020-10-01 ENCOUNTER — PATIENT MESSAGE (OUTPATIENT)
Dept: ORTHOPEDICS | Facility: CLINIC | Age: 35
End: 2020-10-01

## 2020-10-01 PROBLEM — G89.18 POST-OPERATIVE PAIN: Status: ACTIVE | Noted: 2020-10-01

## 2020-10-01 NOTE — PLAN OF CARE
[facility]  Physical Therapy Initial Evaluation    Date: 9/30/2020   Name: Solomon Busby Jr.  Clinic Number: [id number]    Therapy Diagnosis:   Encounter Diagnoses   Name Primary?    Decreased range of motion (ROM) of right knee Yes    Difficulty in walking     Difficulty participating in sporting activities     Swelling of knee     Quadriceps weakness     Post-operative pain      Physician: Ines Marina MD    Physician Orders: PT Eval and Treat   Medical Diagnosis from Referral:   S83.511A (ICD-10-CM) - Rupture of anterior cruciate ligament of right knee, initial encounter   S83.241A (ICD-10-CM) - Acute medial meniscus tear of right knee, initial encounter   Post-op Diagnosis:  Post-Op Diagnosis Codes:     * Complete tear of anterior cruciate ligament of right knee, initial encounter [S83.511A]     * Acute medial meniscus tear of right knee, initial encounter [S83.241A]     * Peripheral tear of lateral meniscus of right knee as current injury, initial encounter [S83.261A]    Evaluation Date: 9/30/2020  Authorization Period Expiration: 9/29/21  Plan of Care Expiration: 12/23/20  Visit # / Visits authorized: 1/ pending  Surgery date: 9/23/20 (7 days post op as of 9/30)    Time In: 4:15 pm  Time Out: 5:08 pm  Total Appointment Time (timed & untimed codes): 53 minutes    Precautions: Standard and post surgical   Weight bearing restrictions (specify):  Order Comments:  NWB RLE for 2 weeks    POSTOPERATIVE PLAN: Patient will be non weight bearing for two weeks - weight bearing will be progressed with the brace locked in extension. Range of motion may be full. Plan to follow an ACL allograft rehab protocol. Initial goals include maintenance of full knee extension, regaining flexion, swelling control, and quadriceps activation exercises.        Subjective   Date of onset: 9/23/20  History of current condition - Solomon reports: s/p R ACL reconstruction with allograft and medial  meniscus repair. He was concerned by swelling in his quad but saw MD prior to appointment and no signs of infection. Denies numbness/tingling into R foot. He has not been using crutches and states he is not sure what his precautions are.      Medical History:   Past Medical History:   Diagnosis Date    Asthma     Headache     Hernia        Surgical History:   Solomon Busby Jr.  has a past surgical history that includes Hernia repair; Knee arthroscopy w/ ACL reconstruction (Right, 9/23/2020); and Repair of meniscus of knee (Right, 9/23/2020).    Medications:   Solomon has a current medication list which includes the following prescription(s): albuterol-ipratropium, aspirin, cetirizine, cyclobenzaprine, fluticasone propionate, ondansetron, oxycodone-acetaminophen, qvar redihaler, and ventolin hfa.    Allergies:   Review of patient's allergies indicates:  No Known Allergies     Imaging, MRI studies 8/6/20:     R knee:     Impression:     Torn ACL.     Nondisplaced medial and lateral meniscal tears, as above.     Moderate sized osseous contusion of the lateral femoral condyle.     Grade I/II sprain at the MPFL-VMO complex.     Moderate-sized joint effusion.    Prior Therapy: yes, at this clinic for prehab ACL tear  Social History: Lives with his wife   Occupation: Cover handler on the [employer]  Prior Level of Function: independent with ADL's  Current Level of Function: post surgical precautions    Pain:  Current 6/10, worst 8/10, best 0/10   Location: right knee    Description: Aching and Dull  Aggravating Factors: bending  Easing Factors: ice    Patients goals: return to PLOF    Objective     Observation: Ambulates into clinic full WB R LE with Tscope brace. Sutures present, clean and dry with no signs of infection      Range of Motion:   Knee Left active Right Active   Flexion 130 55   Extension 0 -8           Lower Extremity Strength  Right LE  Left LE    Knee extension: 3-/5 Knee extension: 5/5   Knee  flexion: 3-/5 Knee flexion: 5/5   Hip extension:  3/5 Hip extension: 5/5   Hip abduction: 3-/5 Hip abduction: 5/5       Special Tests: Hailey's DVT: negative     Function:  - Bed Mobility: Min A R LE    Joint Mobility: Hypomobile  Patella all planes on R    Palpation: general TTP R knee    Sensation: SILT B LE     Edema: non pitting edema R quad    Girth Measurement Joint line 10 cm below 10 cm above   Left 43 cm 37 cm 38 cm   Right 49.5 cm 37 cm 47 cm           Limitation/Restriction for FOTO Knee Survey    Therapist reviewed FOTO scores for Solomon Busby Jr. on 9/30/2020.   FOTO documents entered into [employer] - see Media section.    Limitation Score: 67%         TREATMENT   Treatment Time In: 4:15 pm  Treatment Time Out: 5:08 pm  Total Treatment time (time-based codes) separate from Evaluation: 25 minutes    Solomon received therapeutic exercises to develop strength and endurance for 15 minutes including:  Seated knee AAROM flex/ext 20 x   Sidelying hip abduction 2 x 10 with PT assistance  Heel prop x 5 minutes  Prone hip extension 2 x 10   HEP      Solomon received the following manual therapy techniques: Joint mobilizations were applied to the: R patella for 0 minutes, including:      Solomon participated in neuromuscular re-education activities to improve: Coordination and Proprioception for 10 minutes. The following activities were included:  Russian with quad set with heel prop x 10 min, 10 seconds on 10 seconds off co contract, 40 mA, 50% duty cycle, 2 sec ramp time    Solomon participated in dynamic functional therapeutic activities to improve functional performance for 0  minutes, including:      Solomon participated in gait training to improve functional mobility and safety for 0  minutes, including:        Solomon received cold pack for 10 minutes to R knee with heel prop to decrease pain/edema.    Home Exercises and Patient Education Provided    Education provided:   - role of PT, plan of care, involved anatomy  and pathology,  goals, rational for treatment and exercise, scheduling limitations  - NWB R LE and post operative precautions    Written Home Exercises Provided: yes.  Exercises were reviewed and Solomon was able to demonstrate them prior to the end of the session.  Solomon demonstrated good  understanding of the education provided.     See EMR under Patient Instructions for exercises provided 9/30/2020.    Assessment   Solomon is a 35 y.o. male referred to outpatient Physical Therapy s/p R ACL reconstruction with allograft and medial meniscus repair (DOS 9/23/20). He is currently 7 days post op. Patient presents with expected post operative pain, decreased ROM R knee, and weakness. Demonstrates fair quad set with non pitting edema R quad. The patient entered clinic full WB R LE and no AD and PT educated patient that he is NWB R LE with crutches until MD clearance. Pt would benefit from skilled PT services in order to address listed deficits, improve tolerance to activities, establish HEP, and decrease pain to maximize quality of life. Pt is motivated to participate in therapy and is in agreement with POC.   Patient prognosis is Good.   Patientt will benefit from skilled outpatient Physical Therapy to address the deficits stated above and in the chart below, provide patient /family education, and to maximize patientt's level of independence.     Plan of care discussed with patient: Yes  Patient's spiritual, cultural and educational needs considered and patient is agreeable to the plan of care and goals as stated below:     Anticipated Barriers for therapy: none    Medical Necessity is demonstrated by the following  History  Co-morbidities and personal factors that may impact the plan of care Co-morbidities:   asthma    Personal Factors:   no deficits     moderate   Examination  Body Structures and Functions, activity limitations and participation restrictions that may impact the plan of care Body Regions:   lower  extremities    Body Systems:    gross symmetry  ROM  strength  gross coordinated movement  balance  gait  transfers  transitions  motor control    Participation Restrictions:   Walking, running, working, sports    Activity limitations:   Learning and applying knowledge  no deficits    General Tasks and Commands  no deficits    Communication  no deficits    Mobility  walking    Self care  no deficits    Domestic Life  doing house work (cleaning house, washing dishes, laundry)    Interactions/Relationships  no deficits    Life Areas  employment    Community and Social Life  community life  recreation and leisure         high   Clinical Presentation evolving clinical presentation with changing clinical characteristics moderate   Decision Making/ Complexity Score: moderate     Goals:  Short Term Goals: 12 weeks   1. The patient with report compliance with HEP to maximize functional outcomes.  2. The patient will demonstrate R knee AROM >/= 120 flexion and 0 degrees extension to allow for normalized gait pattern.   3. The patient will demonstrate lateral heel tap from 8 inch step with good form and control indicating improved quad/hip strength.  4. The patient will ambulate without AD and normalized gait pattern to increase ease of walking between classes at school.  5. The patient will demonstrate >/= 4/5 bilateral hip abduction and extension MMT to prepare patient for return to running with good form.      Long Term Goals: 20 weeks   1. The patient will demonstrate understanding and performance of advanced HEP to allow for independence once discharged from therapy.   2. The patient will initiate straight line running program once cleared by PT/MD.   3. The patient will demonstrate >/= 90% symmetry on Y balance test in all directions to allow for safe return to straight line running.  4. The patient will demonstrate >/= 90 % symmetry on single leg, triple hop, and crossover hop for safe return to agility in  football/basketball.       Plan   Plan of care Certification: 9/30/2020 to 12/24/20.    Outpatient Physical Therapy 3 times weekly for 10 weeks to include the following interventions: Electrical Stimulation Russian, IFC, TENS, Gait Training, Manual Therapy, Moist Heat/ Ice, Neuromuscular Re-ed, Patient Education, Therapeutic Activites and Therapeutic Exercise.     LEANDER SILVA, PT

## 2020-10-02 ENCOUNTER — CLINICAL SUPPORT (OUTPATIENT)
Dept: REHABILITATION | Facility: HOSPITAL | Age: 35
End: 2020-10-02
Attending: ORTHOPAEDIC SURGERY
Payer: COMMERCIAL

## 2020-10-02 DIAGNOSIS — G89.18 POST-OPERATIVE PAIN: ICD-10-CM

## 2020-10-02 PROCEDURE — 97110 THERAPEUTIC EXERCISES: CPT | Mod: PN

## 2020-10-02 PROCEDURE — 97112 NEUROMUSCULAR REEDUCATION: CPT | Mod: PN

## 2020-10-02 NOTE — PROGRESS NOTES
Physical Therapy Treatment Note     Name: Solomon Busby Jr.  Clinic Number: [id number]    Therapy Diagnosis:   Encounter Diagnosis   Name Primary?    Post-operative pain      Physician: Ines Marina MD    Visit Date: 10/2/2020    Physician Orders: PT Eval and Treat   Medical Diagnosis from Referral:   S83.511A (ICD-10-CM) - Rupture of anterior cruciate ligament of right knee, initial encounter   S83.241A (ICD-10-CM) - Acute medial meniscus tear of right knee, initial encounter   Post-op Diagnosis:  Post-Op Diagnosis Codes:     * Complete tear of anterior cruciate ligament of right knee, initial encounter [S83.511A]     * Acute medial meniscus tear of right knee, initial encounter [S83.241A]     * Peripheral tear of lateral meniscus of right knee as current injury, initial encounter [S83.261A]     Evaluation Date: 9/30/2020  Authorization Period Expiration: 9/29/21  Plan of Care Expiration: 12/23/20  Visit #/Visits authorized: 8/ 20     Time In: 0934  Time Out: 1016  Total Billable Time: 42 minutes    Precautions: Standard and Post surgical    Surgery date: 9/23/2020  POW: 2    Weight bearing restrictions (specify):  Order Comments:  NWB RLE for 2 weeks    POSTOPERATIVE PLAN: Patient will be non weight bearing for two weeks - weight bearing will be progressed with the brace locked in extension. Range of motion may be full. Plan to follow an ACL allograft rehab protocol. Initial goals include maintenance of full knee extension, regaining flexion, swelling control, and quadriceps activation exercises.     Subjective     Pt reports: knee is feeling okay today, though quads are still swollen. Increased use of crutches.    He was compliant with home exercise program.  Response to previous treatment: transient soreness   Functional change: increased use of crutches    Pain: 5/10  Location: right knee      Objective     Solomon received therapeutic exercises to develop strength, endurance, ROM and flexibility for 32  "minutes including:    Quad sets 3x10x5"   Heel prep x5'   SLR 3x10   Heel slides AAROM 2x10x3" hold / AROM 2x10   SL Hip ABD 3x10  Prone Hip Ext 3x10   Seated gastroc stretch 4x30"     Solomon received the following manual therapy techniques: Joint mobilizations and Soft tissue Mobilization were applied to the: R knee for 00 minutes, including:    Solomon participated in neuromuscular re-education activities to improve: Coordination and Proprioception for 10 minutes. The following activities were included:  Russian with quad set with heel prop x 10 min, 10 seconds on 10 seconds off co contract, 45 mA, 50% duty cycle, 2 sec ramp time    Solomon participated in dynamic functional therapeutic activities to improve functional performance for 00  minutes, including:    Solomon participated in gait training to improve functional mobility and safety for 00  minutes, including:    Solomon received cold pack for 00 minutes to R knee.      Home Exercises Provided and Patient Education Provided     Education provided:   - Continued use of HEP ad crutches   - Rationale for POC and progression     Written Home Exercises Provided: Patient instructed to cont prior HEP.  Exercises were reviewed and Solomon was able to demonstrate them prior to the end of the session.  Solomon demonstrated good  understanding of the education provided.     See EMR under Patient Instructions for exercises provided prior visit.    Assessment   Solomon was seen today for first follow up since evaluation. He ambulated into clinic with crutches today. HEP reviewed today, and SLR added for continued quad strengthening. Solomon was able to complete both SLR and SL hip abduction without PT assistance. Good tolerance noted with all exercises with the exception of prone hip extension, during which he was unable to maintain knee extension. [place]er e-stim continued for improved quad contraction.     Solomon is progressing well towards his goals.   Pt prognosis is Good. "     Pt will continue to benefit from skilled outpatient physical therapy to address the deficits listed in the problem list box on initial evaluation, provide pt/family education and to maximize pt's level of independence in the home and community environment.     Pt's spiritual, cultural and educational needs considered and pt agreeable to plan of care and goals.     Anticipated barriers to physical therapy: None     Goals:     Short Term Goals: 12 weeks   1. The patient with report compliance with HEP to maximize functional outcomes. - progressing   2. The patient will demonstrate R knee AROM >/= 120 flexion and 0 degrees extension to allow for normalized gait pattern. - progressing   3. The patient will demonstrate lateral heel tap from 8 inch step with good form and control indicating improved quad/hip strength.- progressing   4. The patient will ambulate without AD and normalized gait pattern to increase ease of walking between classes at school.- progressing   5. The patient will demonstrate >/= 4/5 bilateral hip abduction and extension MMT to prepare patient for return to running with good form. - progressing      Long Term Goals: 20 weeks   1. The patient will demonstrate understanding and performance of advanced HEP to allow for independence once discharged from therapy. - progressing   2. The patient will initiate straight line running program once cleared by PT/MD. - progressing   3. The patient will demonstrate >/= 90% symmetry on Y balance test in all directions to allow for safe return to straight line running.- progressing   4. The patient will demonstrate >/= 90 % symmetry on single leg, triple hop, and crossover hop for safe return to agility in football/basketball. - progressing     Plan     Outpatient Physical Therapy 3 times weekly for 10 weeks to include the following interventions: Electrical Stimulation Russian, IFC, TENS, Gait Training, Manual Therapy, Moist Heat/ Ice, Neuromuscular Re-ed,  Patient Education, Therapeutic Activites and Therapeutic Exercise.     Cont with POC, progressing with quad and hamstring strengthening and flexibility training to restore full knee ROM as per protocol.     CARLOZ KILPATRICK, PT, DPT   10/2/2020

## 2020-10-05 ENCOUNTER — CLINICAL SUPPORT (OUTPATIENT)
Dept: REHABILITATION | Facility: HOSPITAL | Age: 35
End: 2020-10-05
Attending: ORTHOPAEDIC SURGERY
Payer: COMMERCIAL

## 2020-10-05 DIAGNOSIS — G89.18 POST-OPERATIVE PAIN: ICD-10-CM

## 2020-10-05 PROCEDURE — 97112 NEUROMUSCULAR REEDUCATION: CPT | Mod: PN

## 2020-10-05 PROCEDURE — 97110 THERAPEUTIC EXERCISES: CPT | Mod: PN

## 2020-10-05 NOTE — PROGRESS NOTES
Physical Therapy Treatment Note     Name: Solomon Busby Jr.  Clinic Number: [id number]    Therapy Diagnosis:   Encounter Diagnosis   Name Primary?    Post-operative pain      Physician: Ines Marina MD    Visit Date: 10/5/2020    Physician Orders: PT Eval and Treat   Medical Diagnosis from Referral:   S83.511A (ICD-10-CM) - Rupture of anterior cruciate ligament of right knee, initial encounter   S83.241A (ICD-10-CM) - Acute medial meniscus tear of right knee, initial encounter   Post-op Diagnosis:  Post-Op Diagnosis Codes:     * Complete tear of anterior cruciate ligament of right knee, initial encounter [S83.511A]     * Acute medial meniscus tear of right knee, initial encounter [S83.241A]     * Peripheral tear of lateral meniscus of right knee as current injury, initial encounter [S83.261A]     Evaluation Date: 9/30/2020  Authorization Period Expiration: 9/29/21  Plan of Care Expiration: 12/23/20  Visit #/Visits authorized: 9/20     Time In: 10:41 am  Time Out: 11:38 am  Total Billable Time: 47 minutes    Precautions: Standard and Post surgical    Surgery date: 9/23/2020  1.5 weeks post op as of 10/5/20    Weight bearing restrictions (specify):  Order Comments:  NWB RLE for 2 weeks    POSTOPERATIVE PLAN: Patient will be non weight bearing for two weeks - weight bearing will be progressed with the brace locked in extension. Range of motion may be full. Plan to follow an ACL allograft rehab protocol. Initial goals include maintenance of full knee extension, regaining flexion, swelling control, and quadriceps activation exercises.     Subjective     Pt reports: he is doing okay. Mainly having trouble with sleeping. His two weeks of crutches are up on Wednesday and he sees MD Thursday.     He was compliant with home exercise program.  Response to previous treatment: transient soreness   Functional change: increased use of crutches    Pain: 5/10  Location: right knee      Objective   Range of Motion:   Knee  Right Active   Flexion 70   Extension -5     Solomon received therapeutic exercises to develop strength, endurance, ROM and flexibility for 35 minutes including:  Prone hang x 5 min 3#  Prone TKE 3 x 10 5 sec hold  +Hamstring/gastroc stretch long sit with strap 3 x 30 sec  Heel slides 20 x 10 sec  Seated knee AAROM flx/ext  SLR flexion 3 x 10  sidelying hip abduction 3 x 10       Solomon received the following manual therapy techniques: Joint mobilizations and Soft tissue Mobilization were applied to the: R knee for 1 minutes, including:  Patella mobs all directions     Solomon participated in neuromuscular re-education activities to improve: Coordination and Proprioception for 10 minutes. The following activities were included:  Russian with quad set with heel prop and 5# x 10 min, 10 seconds on 10 seconds off co contract, 40 mA, 50% duty cycle, 2 sec ramp time-initiated session with this    Solomon participated in dynamic functional therapeutic activities to improve functional performance for 00  minutes, including:    Solomon participated in gait training to improve functional mobility and safety for 00  minutes, including:    Solomon received cold pack for 00 minutes to R knee.      Home Exercises Provided and Patient Education Provided     Education provided:   - Continued use of HEP ad crutches        Written Home Exercises Provided: Patient instructed to cont prior HEP.  Exercises were reviewed and Solomon was able to demonstrate them prior to the end of the session.  Solomon demonstrated good  understanding of the education provided.     See EMR under Patient Instructions for exercises provided prior visit.    Assessment   The patient is currently 1.5 weeks post op ACL reconstruction with allograft and meniscus repair on R. He demonstrates improved R knee AROM from - 5 to 70 degrees and able to perform SLR flexion  Without extensor lag after extension stretching performed. Russian e-stim for quadriceps neuromuscular  coordination and strengthening still appropriate at this time. Overall good tolerance to session and patient continues to remain NWB R LE until 10/7/20.  Solomon is progressing well towards his goals.   Pt prognosis is Good.     Pt will continue to benefit from skilled outpatient physical therapy to address the deficits listed in the problem list box on initial evaluation, provide pt/family education and to maximize pt's level of independence in the home and community environment.     Pt's spiritual, cultural and educational needs considered and pt agreeable to plan of care and goals.     Anticipated barriers to physical therapy: None     Goals:     Short Term Goals: 12 weeks   1. The patient with report compliance with HEP to maximize functional outcomes. - progressing   2. The patient will demonstrate R knee AROM >/= 120 flexion and 0 degrees extension to allow for normalized gait pattern. - progressing   3. The patient will demonstrate lateral heel tap from 8 inch step with good form and control indicating improved quad/hip strength.- progressing   4. The patient will ambulate without AD and normalized gait pattern to increase ease of walking between classes at school.- progressing   5. The patient will demonstrate >/= 4/5 bilateral hip abduction and extension MMT to prepare patient for return to running with good form. - progressing      Long Term Goals: 20 weeks   1. The patient will demonstrate understanding and performance of advanced HEP to allow for independence once discharged from therapy. - progressing   2. The patient will initiate straight line running program once cleared by PT/MD. - progressing   3. The patient will demonstrate >/= 90% symmetry on Y balance test in all directions to allow for safe return to straight line running.- progressing   4. The patient will demonstrate >/= 90 % symmetry on single leg, triple hop, and crossover hop for safe return to agility in football/basketball. - progressing      Plan     Continue with skilled PT per protocol. Focus on gaining full knee extension.       LEANDER SILVA, PT, DPT   10/5/2020

## 2020-10-07 ENCOUNTER — CLINICAL SUPPORT (OUTPATIENT)
Dept: REHABILITATION | Facility: HOSPITAL | Age: 35
End: 2020-10-07
Attending: ORTHOPAEDIC SURGERY
Payer: COMMERCIAL

## 2020-10-07 DIAGNOSIS — G89.18 POST-OPERATIVE PAIN: ICD-10-CM

## 2020-10-07 PROCEDURE — 97110 THERAPEUTIC EXERCISES: CPT | Mod: PN

## 2020-10-07 PROCEDURE — 97112 NEUROMUSCULAR REEDUCATION: CPT | Mod: PN

## 2020-10-07 NOTE — PROGRESS NOTES
Physical Therapy Treatment Note     Name: Solomon Busby Jr.  Clinic Number: [id number]    Therapy Diagnosis:   Encounter Diagnosis   Name Primary?    Post-operative pain      Physician: Ines Marina MD    Visit Date: 10/7/2020    Physician Orders: PT Eval and Treat   Medical Diagnosis from Referral:   S83.511A (ICD-10-CM) - Rupture of anterior cruciate ligament of right knee, initial encounter   S83.241A (ICD-10-CM) - Acute medial meniscus tear of right knee, initial encounter   Post-op Diagnosis:  Post-Op Diagnosis Codes:     * Complete tear of anterior cruciate ligament of right knee, initial encounter [S83.511A]     * Acute medial meniscus tear of right knee, initial encounter [S83.241A]     * Peripheral tear of lateral meniscus of right knee as current injury, initial encounter [S83.261A]     Evaluation Date: 9/30/2020  Authorization Period Expiration: 9/29/21  Plan of Care Expiration: 12/23/20  Visit #/Visits authorized: 4    Time In: 1:10 pm  Time Out: 2:16 pm  Total Billable Time: 64 minutes    Precautions: Standard and Post surgical    Surgery date: 9/23/2020  2 weeks post op as of 10/7/20    Weight bearing restrictions (specify):  Order Comments:  NWB RLE for 2 weeks- ends 10/7/20    POSTOPERATIVE PLAN: Patient will be non weight bearing for two weeks - weight bearing will be progressed with the brace locked in extension. Range of motion may be full. Plan to follow an ACL allograft rehab protocol. Initial goals include maintenance of full knee extension, regaining flexion, swelling control, and quadriceps activation exercises.     Subjective     Pt reports: he had some patellar pain last night when trying to sleep. Suture removal is tomorrow.     He was compliant with home exercise program.  Response to previous treatment: no adverse reactions   Functional change: increased use of crutches    Pain: 4/10  Location: right knee      Objective   Range of Motion 10/7/20:   Knee Right Active   Flexion 70    Extension 0     Solomon received therapeutic exercises to develop strength, endurance, ROM and flexibility for 52 minutes including:  Heel prop with gastroc stretch overpressure and 10# cuff weight 6 min  Prone hang x 4 min 4#  Prone TKE 3 x 10 5 sec hold  Hamstring/gastroc stretch long sit with strap 3 x 30 sec  Heel slides 20 x 10 sec  Seated knee AAROM flx/ext 20 x  +standing calf raises 3 x 10 (brace locked into extension)  +weight shift to SLS 10 x 10 sec (brace locking into extension)  SLR flexion 3 x 10  sidelying hip abduction 4 x 10 (brace locked into extension)       Solomon received the following manual therapy techniques: Joint mobilizations and Soft tissue Mobilization were applied to the: R knee for 2 minutes, including:  Patella mobs all directions     Solomon participated in neuromuscular re-education activities to improve: Coordination and Proprioception for 10 minutes. The following activities were included:  Russian with quad set with heel prop ax 10 min, 10 seconds on 10 seconds off co contract, 42 mA, 50% duty cycle, 2 sec ramp time    Solomon participated in dynamic functional therapeutic activities to improve functional performance for 00  minutes, including:    Solomon participated in gait training to improve functional mobility and safety for 00  minutes, including:    Solomon received cold pack for 00 minutes to R knee.      Home Exercises Provided and Patient Education Provided     Education provided:   - Continued use of HEP        Written Home Exercises Provided: Patient instructed to cont prior HEP.  Exercises were reviewed and Solomon was able to demonstrate them prior to the end of the session.  Solomon demonstrated good  understanding of the education provided.     See EMR under Patient Instructions for exercises provided prior visit.    Assessment   The patient is currently 2 weeks post op ACL reconstruction with allograft and meniscus repair on R and  demonstrates improved R knee AROM  from 0 to 70 degrees. Mild extensor lag with SLR flexion today due to quadriceps fatigue. Decreased edema noted in R quad today and patient reports feeling he can contract his quadriceps better. Hypomobility noted in all directions R patell that improved with mobilizations. Doing well post operatively and educated patient to continue with ROM/stretchting, especially into extension.   Solomon is progressing well towards his goals.   Pt prognosis is Good.     Pt will continue to benefit from skilled outpatient physical therapy to address the deficits listed in the problem list box on initial evaluation, provide pt/family education and to maximize pt's level of independence in the home and community environment.     Pt's spiritual, cultural and educational needs considered and pt agreeable to plan of care and goals.     Anticipated barriers to physical therapy: None     Goals:     Short Term Goals: 12 weeks   1. The patient with report compliance with HEP to maximize functional outcomes. - progressing   2. The patient will demonstrate R knee AROM >/= 120 flexion and 0 degrees extension to allow for normalized gait pattern. - progressing   3. The patient will demonstrate lateral heel tap from 8 inch step with good form and control indicating improved quad/hip strength.- progressing   4. The patient will ambulate without AD and normalized gait pattern to increase ease of walking between classes at school.- progressing   5. The patient will demonstrate >/= 4/5 bilateral hip abduction and extension MMT to prepare patient for return to running with good form. - progressing      Long Term Goals: 20 weeks   1. The patient will demonstrate understanding and performance of advanced HEP to allow for independence once discharged from therapy. - progressing   2. The patient will initiate straight line running program once cleared by PT/MD. - progressing   3. The patient will demonstrate >/= 90% symmetry on Y balance test in all  directions to allow for safe return to straight line running.- progressing   4. The patient will demonstrate >/= 90 % symmetry on single leg, triple hop, and crossover hop for safe return to agility in football/basketball. - progressing     Plan     Continue with skilled PT per protocol. Focus on gaining full knee extension.       LEANDER SILVA, PT, DPT   10/7/2020

## 2020-10-08 ENCOUNTER — OFFICE VISIT (OUTPATIENT)
Dept: ORTHOPEDICS | Facility: CLINIC | Age: 35
End: 2020-10-08
Payer: COMMERCIAL

## 2020-10-08 VITALS
RESPIRATION RATE: 18 BRPM | DIASTOLIC BLOOD PRESSURE: 78 MMHG | HEIGHT: 67 IN | OXYGEN SATURATION: 98 % | WEIGHT: 198 LBS | HEART RATE: 97 BPM | BODY MASS INDEX: 31.08 KG/M2 | SYSTOLIC BLOOD PRESSURE: 120 MMHG

## 2020-10-08 DIAGNOSIS — S83.511D COMPLETE TEAR OF ANTERIOR CRUCIATE LIGAMENT OF RIGHT KNEE, SUBSEQUENT ENCOUNTER: Primary | ICD-10-CM

## 2020-10-08 DIAGNOSIS — S83.241D ACUTE MEDIAL MENISCUS TEAR OF RIGHT KNEE, SUBSEQUENT ENCOUNTER: ICD-10-CM

## 2020-10-08 PROCEDURE — 99024 PR POST-OP FOLLOW-UP VISIT: ICD-10-PCS | Mod: S$GLB,,, | Performed by: PHYSICIAN ASSISTANT

## 2020-10-08 PROCEDURE — 99999 PR PBB SHADOW E&M-EST. PATIENT-LVL IV: ICD-10-PCS | Mod: PBBFAC,,, | Performed by: PHYSICIAN ASSISTANT

## 2020-10-08 PROCEDURE — 99999 PR PBB SHADOW E&M-EST. PATIENT-LVL IV: CPT | Mod: PBBFAC,,, | Performed by: PHYSICIAN ASSISTANT

## 2020-10-08 PROCEDURE — 99024 POSTOP FOLLOW-UP VISIT: CPT | Mod: S$GLB,,, | Performed by: PHYSICIAN ASSISTANT

## 2020-10-08 RX ORDER — HYDROCODONE BITARTRATE AND ACETAMINOPHEN 10; 325 MG/1; MG/1
1 TABLET ORAL EVERY 6 HOURS PRN
Qty: 15 TABLET | Refills: 0 | Status: SHIPPED | OUTPATIENT
Start: 2020-10-08 | End: 2020-12-21

## 2020-10-08 NOTE — PROGRESS NOTES
First Postoperative Visit    History of Present Illness:   Solomon Busby Jr.   is s/p left knee ACL reconstruction and medial meniscal repair who comes to the office for 2 weeks post op evaluation (DOS 9/23/20). His pain is well controlled.  He states he just ran out of his initial percocet prescription. The patient is wearing the brace  in a compliant fashion.  He has been bearing weight on his leg. He denies fevers or chills.  He has started PT.  He has no other concerns at this time.      Physical Examination:  He is alert, awake and oriented to time, place and person. He does not appear to be in any distress, and denies any constitutional symptoms. Examination of the left knee demonstrates healing incisions with no erythema or swelling.      Assessment/Plan:  1. 2 weeks s/p left knee arthroscopic ACL reconstruction with medial meniscal repair  2. Sutures were removed today  3. Discussed importance of weightbearing restrictions.  He should advance to 50% weightbearing and brace should be locked in extension with ambulation.  ROM ok to 90 degrees flexion, 10 degrees hyperextension.  4. He was given refill for narcotic pain medications and was told that to wean off these medications.  Wean down to Norco.   5. Follow up in 4 weeks with Dr. Marina.

## 2020-10-14 ENCOUNTER — CLINICAL SUPPORT (OUTPATIENT)
Dept: REHABILITATION | Facility: HOSPITAL | Age: 35
End: 2020-10-14
Attending: ORTHOPAEDIC SURGERY
Payer: COMMERCIAL

## 2020-10-14 DIAGNOSIS — G89.18 POST-OPERATIVE PAIN: ICD-10-CM

## 2020-10-14 PROCEDURE — 97110 THERAPEUTIC EXERCISES: CPT | Mod: PN

## 2020-10-14 PROCEDURE — 97112 NEUROMUSCULAR REEDUCATION: CPT | Mod: PN

## 2020-10-14 NOTE — PROGRESS NOTES
Physical Therapy Treatment Note     Name: Solomon Busby Jr.  Clinic Number: [id number]    Therapy Diagnosis:   Encounter Diagnosis   Name Primary?    Post-operative pain      Physician: Ines Marina MD    Visit Date: 10/14/2020    Physician Orders: PT Eval and Treat   Medical Diagnosis from Referral:   S83.511A (ICD-10-CM) - Rupture of anterior cruciate ligament of right knee, initial encounter   S83.241A (ICD-10-CM) - Acute medial meniscus tear of right knee, initial encounter   Post-op Diagnosis:  Post-Op Diagnosis Codes:     * Complete tear of anterior cruciate ligament of right knee, initial encounter [S83.511A]     * Acute medial meniscus tear of right knee, initial encounter [S83.241A]     * Peripheral tear of lateral meniscus of right knee as current injury, initial encounter [S83.261A]     Evaluation Date: 9/30/2020  Authorization Period Expiration: 9/29/21  Plan of Care Expiration: 12/23/20  Visit #/Visits authorized: 5    Time In: 1:28 pm (13 minutes late)   Time Out: 2:16 pm  Total Billable Time: 48 minutes    Precautions: Standard and Post surgical    Surgery date: 9/23/2020  3 weeks post op as of 10/14/20    Weight bearing restrictions (specify):  50%     POSTOPERATIVE PLAN: Patient will be non weight bearing for two weeks - weight bearing will be progressed with the brace locked in extension. Range of motion may be full. Plan to follow an ACL allograft rehab protocol. Initial goals include maintenance of full knee extension, regaining flexion, swelling control, and quadriceps activation exercises.     Subjective     Pt reports: MD has him at 50% WB.     He was compliant with home exercise program.  Response to previous treatment: no adverse reactions   Functional change: increased use of crutches    Pain: 4/10  Location: right knee      Objective   Range of Motion 10/14/20:   Knee Right Active   Flexion 76   Extension 0     Solomon received therapeutic exercises to develop strength, endurance,  ROM and flexibility for 36 minutes including:  Heel prop with gastroc stretch overpressure and 10# cuff weight 6 min  Prone hang x 4 min 5#  Prone TKE 4 x 10 5 sec hold  Hamstring/gastroc stretch long sit with strap 2 x 1 min sec  Heel slides 20 x 10 sec  Seated knee AAROM flx/ext 20 x  SLR flexion 3 x 10  sidelying hip abduction 4 x 10   Tband PF 4 x 10 Black T  Band       Solomon received the following manual therapy techniques: Joint mobilizations and Soft tissue Mobilization were applied to the: R knee for 2 minutes, including:  Patella mobs all directions     Solomon participated in neuromuscular re-education activities to improve: Coordination and Proprioception for 10 minutes. The following activities were included:  Russian with quad set with heel prop ax 10 min, 10 seconds on 10 seconds off co contract, 31 mA, 50% duty cycle, 2 sec ramp time    Solomon participated in dynamic functional therapeutic activities to improve functional performance for 00  minutes, including:    Solomon participated in gait training to improve functional mobility and safety for 00  minutes, including:    Solomon received cold pack for 00 minutes to R knee.      Home Exercises Provided and Patient Education Provided     Education provided:   - Continued use of HEP        Written Home Exercises Provided: Patient instructed to cont prior HEP.  Exercises were reviewed and Solomon was able to demonstrate them prior to the end of the session.  Solomon demonstrated good  understanding of the education provided.     See EMR under Patient Instructions for exercises provided prior visit.    Assessment   The patient is currently 3 weeks post op ACL reconstruction with allograft and meniscus repair on R and cleared for 50% WB. He continues with patellar hypomobility and decreased knee flexion/extension AROM. Able to achieve full knee extension after stretching performed and mild lag today with SLR flexion. Continues to remain appropriate for  [place] with quad set to improve R quad strength, especially while patient has limitations in WB. Pain was well controlled during session with complaints of stiffness during flexion.   Solomon is progressing well towards his goals.   Pt prognosis is Good.     Pt will continue to benefit from skilled outpatient physical therapy to address the deficits listed in the problem list box on initial evaluation, provide pt/family education and to maximize pt's level of independence in the home and community environment.     Pt's spiritual, cultural and educational needs considered and pt agreeable to plan of care and goals.     Anticipated barriers to physical therapy: None     Goals:     Short Term Goals: 12 weeks   1. The patient with report compliance with HEP to maximize functional outcomes. - progressing   2. The patient will demonstrate R knee AROM >/= 120 flexion and 0 degrees extension to allow for normalized gait pattern. - progressing   3. The patient will demonstrate lateral heel tap from 8 inch step with good form and control indicating improved quad/hip strength.- progressing   4. The patient will ambulate without AD and normalized gait pattern to increase ease of walking between classes at school.- progressing   5. The patient will demonstrate >/= 4/5 bilateral hip abduction and extension MMT to prepare patient for return to running with good form. - progressing      Long Term Goals: 20 weeks   1. The patient will demonstrate understanding and performance of advanced HEP to allow for independence once discharged from therapy. - progressing   2. The patient will initiate straight line running program once cleared by PT/MD. - progressing   3. The patient will demonstrate >/= 90% symmetry on Y balance test in all directions to allow for safe return to straight line running.- progressing   4. The patient will demonstrate >/= 90 % symmetry on single leg, triple hop, and crossover hop for safe return to agility in  football/basketball. - progressing     Plan     Continue with skilled PT per protocol. Focus on gaining full knee extension.       LEANDER SILVA, PT, DPT   10/14/2020

## 2020-10-15 ENCOUNTER — CLINICAL SUPPORT (OUTPATIENT)
Dept: REHABILITATION | Facility: HOSPITAL | Age: 35
End: 2020-10-15
Attending: ORTHOPAEDIC SURGERY
Payer: COMMERCIAL

## 2020-10-15 DIAGNOSIS — G89.18 POST-OPERATIVE PAIN: ICD-10-CM

## 2020-10-15 PROCEDURE — 97112 NEUROMUSCULAR REEDUCATION: CPT | Mod: PN

## 2020-10-15 PROCEDURE — 97110 THERAPEUTIC EXERCISES: CPT | Mod: PN

## 2020-10-15 NOTE — PROGRESS NOTES
Physical Therapy Treatment Note     Name: Solomon Busby Jr.  Clinic Number: [id number]    Therapy Diagnosis:   Encounter Diagnosis   Name Primary?    Post-operative pain      Physician: Ines Marina MD    Visit Date: 10/15/2020    Physician Orders: PT Eval and Treat   Medical Diagnosis from Referral:   S83.511A (ICD-10-CM) - Rupture of anterior cruciate ligament of right knee, initial encounter   S83.241A (ICD-10-CM) - Acute medial meniscus tear of right knee, initial encounter   Post-op Diagnosis:  Post-Op Diagnosis Codes:     * Complete tear of anterior cruciate ligament of right knee, initial encounter [S83.511A]     * Acute medial meniscus tear of right knee, initial encounter [S83.241A]     * Peripheral tear of lateral meniscus of right knee as current injury, initial encounter [S83.261A]     Evaluation Date: 9/30/2020  Authorization Period Expiration: 9/29/21  Plan of Care Expiration: 12/23/20  Visit #/Visits authorized: 6    Time In: 10:30 am    Time Out: 11:25 am  Total Billable Time: 43 minutes    Precautions: Standard and Post surgical    Surgery date: 9/23/2020  3 weeks post op as of 10/14/20    Weight bearing restrictions (specify):  50%     POSTOPERATIVE PLAN: Patient will be non weight bearing for two weeks - weight bearing will be progressed with the brace locked in extension. Range of motion may be full. Plan to follow an ACL allograft rehab protocol. Initial goals include maintenance of full knee extension, regaining flexion, swelling control, and quadriceps activation exercises.     Subjective     Pt reports: he is ready to be full WB again.   He was compliant with home exercise program.  Response to previous treatment: no adverse reactions   Functional change: none reported    Pain: 4/10  Location: right knee      Objective   Range of Motion 10/15/20:   Knee Right Active   Flexion 80   Extension 0     Solomon received therapeutic exercises to develop strength, endurance, ROM and flexibility  for 34 minutes including:  Heel prop with gastroc stretch overpressure and 10# cuff weight 5 min  Prone hang x 5 min 5#  Prone TKE 4 x 10 5 sec hold  Hamstring/gastroc stretch long sit with strap 2 x 1 min sec  Heel slides 20 x 10 sec  Seated knee AAROM flx/ext 30 x  SLR flexion 3 x 10  SLR adduction-attempted but unable to perform  sidelying hip abduction 4 x 10   Tband PF 4 x 10 Black T  Band  +next visit: standing hip abduction and extension with T band NWB surgical limb      Solomon received the following manual therapy techniques: Joint mobilizations and Soft tissue Mobilization were applied to the: R knee for 00 minutes, including:  Patella mobs all directions     Solomon participated in neuromuscular re-education activities to improve: Coordination and Proprioception for 10 minutes. The following activities were included:  Russian with quad set with heel prop ax 10 min, 10 seconds on 20 seconds off co contract, 32 mA, 50% duty cycle, 2 sec ramp time- overpressure with gastroc stretch    Solomon participated in dynamic functional therapeutic activities to improve functional performance for 00  minutes, including:    Solomon participated in gait training to improve functional mobility and safety for 00  minutes, including:    Solomon received cold pack for 10 minutes to R knee.      Home Exercises Provided and Patient Education Provided     Education provided:   - Continued use of HEP        Written Home Exercises Provided: Patient instructed to cont prior HEP.  Exercises were reviewed and Solomon was able to demonstrate them prior to the end of the session.  Solomon demonstrated good  understanding of the education provided.     See EMR under Patient Instructions for exercises provided prior visit.    Assessment   The patient demonstrates improved flexion today to 80 degrees but continues to require increased knee extension stretching to achieve 0 degrees extension. Fair quad set but [place] e-stim continued to  improve strength and neuromuscular control. Mild extensor lag with SLR flexion and requires cueing to decrease speed of movement for improved form. Attempted sidelying hip adduction but unable to perform due to weakness. Progressing appropriately per protocol and he is compliant with 50% WB status.   Solomon is progressing well towards his goals.   Pt prognosis is Good.     Pt will continue to benefit from skilled outpatient physical therapy to address the deficits listed in the problem list box on initial evaluation, provide pt/family education and to maximize pt's level of independence in the home and community environment.     Pt's spiritual, cultural and educational needs considered and pt agreeable to plan of care and goals.     Anticipated barriers to physical therapy: None     Goals:     Short Term Goals: 12 weeks   1. The patient with report compliance with HEP to maximize functional outcomes. - progressing   2. The patient will demonstrate R knee AROM >/= 120 flexion and 0 degrees extension to allow for normalized gait pattern. - progressing   3. The patient will demonstrate lateral heel tap from 8 inch step with good form and control indicating improved quad/hip strength.- progressing   4. The patient will ambulate without AD and normalized gait pattern to increase ease of walking between classes at school.- progressing   5. The patient will demonstrate >/= 4/5 bilateral hip abduction and extension MMT to prepare patient for return to running with good form. - progressing      Long Term Goals: 20 weeks   1. The patient will demonstrate understanding and performance of advanced HEP to allow for independence once discharged from therapy. - progressing   2. The patient will initiate straight line running program once cleared by PT/MD. - progressing   3. The patient will demonstrate >/= 90% symmetry on Y balance test in all directions to allow for safe return to straight line running.- progressing   4. The  patient will demonstrate >/= 90 % symmetry on single leg, triple hop, and crossover hop for safe return to agility in football/basketball. - progressing     Plan     Continue with skilled PT per protocol. Focus on gaining full knee extension.       LEANDER SILVA, PT, DPT   10/15/2020

## 2020-10-20 ENCOUNTER — DOCUMENTATION ONLY (OUTPATIENT)
Dept: REHABILITATION | Facility: HOSPITAL | Age: 35
End: 2020-10-20

## 2020-10-21 ENCOUNTER — CLINICAL SUPPORT (OUTPATIENT)
Dept: REHABILITATION | Facility: HOSPITAL | Age: 35
End: 2020-10-21
Attending: ORTHOPAEDIC SURGERY
Payer: COMMERCIAL

## 2020-10-21 DIAGNOSIS — G89.18 POST-OPERATIVE PAIN: ICD-10-CM

## 2020-10-21 PROCEDURE — 97112 NEUROMUSCULAR REEDUCATION: CPT | Mod: PN

## 2020-10-21 PROCEDURE — 97110 THERAPEUTIC EXERCISES: CPT | Mod: PN

## 2020-10-21 NOTE — PROGRESS NOTES
Physical Therapy Treatment Note     Name: Solomon Busby Jr.  Clinic Number: [id number]    Therapy Diagnosis:   Encounter Diagnosis   Name Primary?    Post-operative pain      Physician: Ines Marina MD    Visit Date: 10/21/2020    Physician Orders: PT Eval and Treat   Medical Diagnosis from Referral:   S83.511A (ICD-10-CM) - Rupture of anterior cruciate ligament of right knee, initial encounter   S83.241A (ICD-10-CM) - Acute medial meniscus tear of right knee, initial encounter   Post-op Diagnosis:  Post-Op Diagnosis Codes:     * Complete tear of anterior cruciate ligament of right knee, initial encounter [S83.511A]     * Acute medial meniscus tear of right knee, initial encounter [S83.241A]     * Peripheral tear of lateral meniscus of right knee as current injury, initial encounter [S83.261A]     Evaluation Date: 9/30/2020  Authorization Period Expiration: 9/29/21  Plan of Care Expiration: 12/23/20  Visit #/Visits authorized: 7/20  PN 10/28/20    Time In: 3:30 pm    Time Out: 4:28  pm  Total Billable Time: 56 minutes    Precautions: Standard and Post surgical    Surgery date: 9/23/2020  3 weeks post op as of 10/14/20    Weight bearing restrictions (specify):  50%     POSTOPERATIVE PLAN: Patient will be non weight bearing for two weeks - weight bearing will be progressed with the brace locked in extension. Range of motion may be full. Plan to follow an ACL allograft rehab protocol. Initial goals include maintenance of full knee extension, regaining flexion, swelling control, and quadriceps activation exercises.     Subjective     Pt reports: he was rushing to get here because his mom was in the hospital and forgot his crutches.   He was compliant with home exercise program.  Response to previous treatment: no adverse reactions   Functional change: none reported    Pain: 4/10  Location: right knee      Objective   Range of Motion 10/15/20:   Knee Right Active   Flexion 80   Extension 0     Solomon fiona  therapeutic exercises (bold exercises performed) to develop strength, endurance, ROM and flexibility for 46 minutes including:  Heel prop with gastroc stretch overpressure and 10# cuff weight 5 min  Prone hang x 5 min 5#  Prone TKE 3 x 10 5 sec hold 5 # cuff weight on posterior knee  +prone hamstring curls 3 x 10 0#   Hamstring/gastroc stretch long sit with strap 2 x 1 min sec  Heel slides 20 x 10 sec  Seated knee AAROM flx/ext 20 x10 sec  SLR flexion 3 x 12  SLR adduction-attempted but unable to perform  sidelying hip abduction 3 x 10  2#  Tband PF 4 x 10 Black T  Band   standing  With L  CKC hip abduction, extension, flexion 3 x 10 ea BTB at ankles for flexion and below knee for abd/ext       Solomon received the following manual therapy techniques: Joint mobilizations and Soft tissue Mobilization were applied to the: R knee for 2 minutes, including:  Patella mobs all directions GIII    Solomon participated in neuromuscular re-education activities to improve: Coordination and Proprioception for 8 minutes. The following activities were included:  Russian with quad set with heel prop x8 min with 10# cuff weight on thigh, 10 seconds on 20 seconds off co contract, 40 mA, 50% duty cycle, 2 sec ramp time    Solomon participated in dynamic functional therapeutic activities to improve functional performance for 00  minutes, including:    Solomon participated in gait training to improve functional mobility and safety for 00  minutes, including:    Solomon received cold pack for 10 minutes to R knee.      Home Exercises Provided and Patient Education Provided     Education provided:   - Continued use of HEP        Written Home Exercises Provided: Patient instructed to cont prior HEP.  Exercises were reviewed and Solomon was able to demonstrate them prior to the end of the session.  Solomon demonstrated good  understanding of the education provided.     See EMR under Patient Instructions for exercises provided prior  visit.    Assessment   The patient presents to clinic today full WB R LE despite PT and MD education on this, reporting he was rushing and forgot his crutches. Continues to remain stiff into flexion, measuring 80 degrees during heel slides and patellar mobilizations continued to improve mobility. Required cueing for form of SLR flexion to minimize extensor lag, and was able to perform correctly once cueing issued.   Progressing appropriately per protocol and he is partially compliant with 50% WB status.   Solomon is progressing well towards his goals.   Pt prognosis is Good.     Pt will continue to benefit from skilled outpatient physical therapy to address the deficits listed in the problem list box on initial evaluation, provide pt/family education and to maximize pt's level of independence in the home and community environment.     Pt's spiritual, cultural and educational needs considered and pt agreeable to plan of care and goals.     Anticipated barriers to physical therapy: None     Goals:     Short Term Goals: 12 weeks   1. The patient with report compliance with HEP to maximize functional outcomes. - progressing   2. The patient will demonstrate R knee AROM >/= 120 flexion and 0 degrees extension to allow for normalized gait pattern. - progressing   3. The patient will demonstrate lateral heel tap from 8 inch step with good form and control indicating improved quad/hip strength.- progressing   4. The patient will ambulate without AD and normalized gait pattern to increase ease of walking between classes at school.- progressing   5. The patient will demonstrate >/= 4/5 bilateral hip abduction and extension MMT to prepare patient for return to running with good form. - progressing      Long Term Goals: 20 weeks   1. The patient will demonstrate understanding and performance of advanced HEP to allow for independence once discharged from therapy. - progressing   2. The patient will initiate straight line running  program once cleared by PT/MD. - progressing   3. The patient will demonstrate >/= 90% symmetry on Y balance test in all directions to allow for safe return to straight line running.- progressing   4. The patient will demonstrate >/= 90 % symmetry on single leg, triple hop, and crossover hop for safe return to agility in football/basketball. - progressing     Plan     Continue with skilled PT per protocol. Focus on gaining full knee extension.       LEANDER SILVA, PT, DPT   10/21/2020

## 2020-10-21 NOTE — PROGRESS NOTES
Physical Therapy Treatment Note     Name: Solomon Busby Jr.  Clinic Number: [id number]    Therapy Diagnosis:   No diagnosis found.  Physician: Ines Marina MD    Visit Date: 10/21/2020    Physician Orders: PT Eval and Treat   Medical Diagnosis from Referral:   S83.511A (ICD-10-CM) - Rupture of anterior cruciate ligament of right knee, initial encounter   S83.241A (ICD-10-CM) - Acute medial meniscus tear of right knee, initial encounter   Post-op Diagnosis:  Post-Op Diagnosis Codes:     * Complete tear of anterior cruciate ligament of right knee, initial encounter [S83.511A]     * Acute medial meniscus tear of right knee, initial encounter [S83.241A]     * Peripheral tear of lateral meniscus of right knee as current injury, initial encounter [S83.261A]     Evaluation Date: 9/30/2020  Authorization Period Expiration: 9/29/21  Plan of Care Expiration: 12/23/20  Visit #/Visits authorized: 12/20 (6)    Time In: ***     Time Out: ***  Total Billable Time: *** minutes    Precautions: Standard and Post surgical    Surgery date: 9/23/2020  3 weeks post op as of 10/14/20    Weight bearing restrictions (specify):  50%     POSTOPERATIVE PLAN: Patient will be non weight bearing for two weeks - weight bearing will be progressed with the brace locked in extension. Range of motion may be full. Plan to follow an ACL allograft rehab protocol. Initial goals include maintenance of full knee extension, regaining flexion, swelling control, and quadriceps activation exercises.     Subjective     Pt reports:*** he is ready to be full WB again.   He was compliant with home exercise program.  Response to previous treatment: no adverse reactions   Functional change: none reported    Pain: 4/10  Location: right knee      Objective   Range of Motion 10/15/20:   Knee Right Active   Flexion 80   Extension 0     Solomon received therapeutic exercises to develop strength, endurance, ROM and flexibility for 34 minutes including:  Heel prop  with gastroc stretch overpressure and 10# cuff weight 5 min  Prone hang x 5 min 5#  Prone TKE 4 x 10 5 sec hold  Hamstring/gastroc stretch long sit with strap 2 x 1 min sec  Heel slides 20 x 10 sec  Seated knee AAROM flx/ext 30 x  SLR flexion 3 x 10  SLR adduction-attempted but unable to perform  sidelying hip abduction 4 x 10   Tband PF 4 x 10 Black T  Band  +next visit: standing hip abduction and extension with T band NWB surgical limb      Solomon received the following manual therapy techniques: Joint mobilizations and Soft tissue Mobilization were applied to the: R knee for 00 minutes, including:  Patella mobs all directions     Solomon participated in neuromuscular re-education activities to improve: Coordination and Proprioception for 10 minutes. The following activities were included:  Russian with quad set with heel prop ax 10 min, 10 seconds on 20 seconds off co contract, 32 mA, 50% duty cycle, 2 sec ramp time- overpressure with gastroc stretch    Solomon participated in dynamic functional therapeutic activities to improve functional performance for 00  minutes, including:    Solomon participated in gait training to improve functional mobility and safety for 00  minutes, including:    Solomon received cold pack for 10 minutes to R knee.      Home Exercises Provided and Patient Education Provided     Education provided:   - Continued use of HEP        Written Home Exercises Provided: Patient instructed to cont prior HEP.  Exercises were reviewed and Solomon was able to demonstrate them prior to the end of the session.  Solomon demonstrated good  understanding of the education provided.     See EMR under Patient Instructions for exercises provided prior visit.    Assessment   ***The patient demonstrates improved flexion today to 80 degrees but continues to require increased knee extension stretching to achieve 0 degrees extension. Fair quad set but [place] e-stim continued to improve strength and neuromuscular  control. Mild extensor lag with SLR flexion and requires cueing to decrease speed of movement for improved form. Attempted sidelying hip adduction but unable to perform due to weakness. Progressing appropriately per protocol and he is compliant with 50% WB status.   Solomon is progressing well towards his goals.   Pt prognosis is Good.     Pt will continue to benefit from skilled outpatient physical therapy to address the deficits listed in the problem list box on initial evaluation, provide pt/family education and to maximize pt's level of independence in the home and community environment.     Pt's spiritual, cultural and educational needs considered and pt agreeable to plan of care and goals.     Anticipated barriers to physical therapy: None     Goals:     Short Term Goals: 12 weeks   1. The patient with report compliance with HEP to maximize functional outcomes. - progressing   2. The patient will demonstrate R knee AROM >/= 120 flexion and 0 degrees extension to allow for normalized gait pattern. - progressing   3. The patient will demonstrate lateral heel tap from 8 inch step with good form and control indicating improved quad/hip strength.- progressing   4. The patient will ambulate without AD and normalized gait pattern to increase ease of walking between classes at school.- progressing   5. The patient will demonstrate >/= 4/5 bilateral hip abduction and extension MMT to prepare patient for return to running with good form. - progressing      Long Term Goals: 20 weeks   1. The patient will demonstrate understanding and performance of advanced HEP to allow for independence once discharged from therapy. - progressing   2. The patient will initiate straight line running program once cleared by PT/MD. - progressing   3. The patient will demonstrate >/= 90% symmetry on Y balance test in all directions to allow for safe return to straight line running.- progressing   4. The patient will demonstrate >/= 90 %  symmetry on single leg, triple hop, and crossover hop for safe return to agility in football/basketball. - progressing     Plan     Continue with skilled PT per protocol. Focus on gaining full knee extension.       Abby Mcdonald, PT, DPT   10/21/2020

## 2020-10-22 ENCOUNTER — CLINICAL SUPPORT (OUTPATIENT)
Dept: REHABILITATION | Facility: HOSPITAL | Age: 35
End: 2020-10-22
Attending: ORTHOPAEDIC SURGERY
Payer: COMMERCIAL

## 2020-10-22 DIAGNOSIS — M25.561 ACUTE PAIN OF RIGHT KNEE: ICD-10-CM

## 2020-10-22 DIAGNOSIS — G89.18 POST-OPERATIVE PAIN: ICD-10-CM

## 2020-10-22 DIAGNOSIS — M25.469 SWELLING OF KNEE: ICD-10-CM

## 2020-10-22 DIAGNOSIS — R26.2 DIFFICULTY IN WALKING: ICD-10-CM

## 2020-10-22 DIAGNOSIS — M25.661 DECREASED RANGE OF MOTION (ROM) OF RIGHT KNEE: Primary | ICD-10-CM

## 2020-10-22 DIAGNOSIS — R68.89 DIFFICULTY PARTICIPATING IN SPORTING ACTIVITIES: ICD-10-CM

## 2020-10-22 PROCEDURE — 97110 THERAPEUTIC EXERCISES: CPT | Mod: PN

## 2020-10-22 PROCEDURE — 97112 NEUROMUSCULAR REEDUCATION: CPT | Mod: PN

## 2020-10-22 NOTE — PROGRESS NOTES
Physical Therapy Treatment Note     Name: Solomon Busby Jr.  Clinic Number: [id number]    Therapy Diagnosis:   Encounter Diagnoses   Name Primary?    Post-operative pain     Decreased range of motion (ROM) of right knee Yes    Difficulty in walking     Difficulty participating in sporting activities     Acute pain of right knee     Swelling of knee      Physician: Ines Marina MD    Visit Date: 10/22/2020    Physician Orders: PT Eval and Treat   Medical Diagnosis from Referral:   S83.511A (ICD-10-CM) - Rupture of anterior cruciate ligament of right knee, initial encounter   S83.241A (ICD-10-CM) - Acute medial meniscus tear of right knee, initial encounter   Post-op Diagnosis:  Post-Op Diagnosis Codes:     * Complete tear of anterior cruciate ligament of right knee, initial encounter [S83.511A]     * Acute medial meniscus tear of right knee, initial encounter [S83.241A]     * Peripheral tear of lateral meniscus of right knee as current injury, initial encounter [S83.261A]     Evaluation Date: 9/30/2020  Authorization Period Expiration: 9/29/21  Plan of Care Expiration: 12/23/20  Visit #/Visits authorized: 8/20  PN 10/28/20    Time In: 12:00 pm    Time Out: 1:15 pm  Total Billable Time: 59 minutes    Precautions: Standard and Post surgical    Surgery date: 9/23/2020  4 weeks post op as of 10/21/20    Weight bearing restrictions (specify):  50%     POSTOPERATIVE PLAN: Patient will be non weight bearing for two weeks - weight bearing will be progressed with the brace locked in extension. Range of motion may be full. Plan to follow an ACL allograft rehab protocol. Initial goals include maintenance of full knee extension, regaining flexion, swelling control, and quadriceps activation exercises.     Subjective     Pt reports: he has not been home yet to get his crutches. His mother is in the hospital and he may need to cancel   He was compliant with home exercise program.  Response to previous treatment: no  adverse reactions   Functional change: none reported    Pain: 4/10  Location: right knee      Objective   Range of Motion 10/15/20:   Knee Right Active   Flexion 80   Extension 0     Solomon received therapeutic exercises (bold exercises performed) to develop strength, endurance, ROM and flexibility for 47 minutes including:  Heel prop with gastroc stretch overpressure and 10# cuff weight 5 min  Prone hang x 5 min 5#  Prone TKE 4 x 10 5 sec hold 5 # cuff weight on posterior knee  1/2 revolutions on star trac bike x 6 min  +prone hamstring curls 3 x 10 0#   Hamstring/gastroc stretch long sit with strap 3 x 1 min sec  Heel slides 20 x 10 sec  Seated knee AAROM flx/ext 20 x10 sec  SLR flexion 4 x 10  SLR adduction 2 x 10  sidelying hip abduction 3 x 12  3#  Tband PF 4 x 10 Black T  Band   standing  With L  CKC hip abduction, extension, flexion 3 x 10 ea BTB at ankles for flexion and below knee for abd/ext       Solomon received the following manual therapy techniques: Joint mobilizations and Soft tissue Mobilization were applied to the: R knee for 2 minutes, including:  Patella mobs all directions GIII    Solomon participated in neuromuscular re-education activities to improve: Coordination and Proprioception for 10 minutes. The following activities were included:  Russian with quad set with heel prop x10min with 10# cuff weight on thigh, 10 seconds on 20 seconds off co contract, 35 mA, 50% duty cycle, 2 sec ramp time    Solomon participated in dynamic functional therapeutic activities to improve functional performance for 00  minutes, including:    Solomon participated in gait training to improve functional mobility and safety for 00  minutes, including:    Solomon received cold pack for 10 minutes to R knee.      Home Exercises Provided and Patient Education Provided     Education provided:   - Continued use of HEP        Written Home Exercises Provided: Patient instructed to cont prior HEP.  Exercises were reviewed and  Solomon was able to demonstrate them prior to the end of the session.  Solomon demonstrated good  understanding of the education provided.     See EMR under Patient Instructions for exercises provided prior visit.    Assessment   The patient presents to clinic today full WB R LE despite PT and MD education on this. Able to perform sidelying hip adduction with moderate dificulty. Continues to lack full flexion but able to tolerated 1/2 revolutions on bike. Progressing appropriately per protocol and he is partially compliant with 50% WB status.   Solomon is progressing well towards his goals.   Pt prognosis is Good.     Pt will continue to benefit from skilled outpatient physical therapy to address the deficits listed in the problem list box on initial evaluation, provide pt/family education and to maximize pt's level of independence in the home and community environment.     Pt's spiritual, cultural and educational needs considered and pt agreeable to plan of care and goals.     Anticipated barriers to physical therapy: None     Goals:     Short Term Goals: 12 weeks   1. The patient with report compliance with HEP to maximize functional outcomes. - progressing   2. The patient will demonstrate R knee AROM >/= 120 flexion and 0 degrees extension to allow for normalized gait pattern. - progressing   3. The patient will demonstrate lateral heel tap from 8 inch step with good form and control indicating improved quad/hip strength.- progressing   4. The patient will ambulate without AD and normalized gait pattern to increase ease of walking between classes at school.- progressing   5. The patient will demonstrate >/= 4/5 bilateral hip abduction and extension MMT to prepare patient for return to running with good form. - progressing      Long Term Goals: 20 weeks   1. The patient will demonstrate understanding and performance of advanced HEP to allow for independence once discharged from therapy. - progressing   2. The  patient will initiate straight line running program once cleared by PT/MD. - progressing   3. The patient will demonstrate >/= 90% symmetry on Y balance test in all directions to allow for safe return to straight line running.- progressing   4. The patient will demonstrate >/= 90 % symmetry on single leg, triple hop, and crossover hop for safe return to agility in football/basketball. - progressing     Plan     Continue with skilled PT per protocol. Focus on gaining full knee extension.       LEANDER SILVA, PT, DPT   10/22/2020

## 2020-10-23 ENCOUNTER — PATIENT MESSAGE (OUTPATIENT)
Dept: ORTHOPEDICS | Facility: CLINIC | Age: 35
End: 2020-10-23

## 2020-10-27 ENCOUNTER — OFFICE VISIT (OUTPATIENT)
Dept: ORTHOPEDICS | Facility: CLINIC | Age: 35
End: 2020-10-27
Attending: ORTHOPAEDIC SURGERY
Payer: COMMERCIAL

## 2020-10-27 ENCOUNTER — APPOINTMENT (OUTPATIENT)
Dept: RADIOLOGY | Facility: HOSPITAL | Age: 35
End: 2020-10-27
Attending: ORTHOPAEDIC SURGERY
Payer: COMMERCIAL

## 2020-10-27 VITALS
HEIGHT: 67 IN | DIASTOLIC BLOOD PRESSURE: 80 MMHG | SYSTOLIC BLOOD PRESSURE: 120 MMHG | WEIGHT: 198 LBS | BODY MASS INDEX: 31.08 KG/M2 | OXYGEN SATURATION: 96 % | RESPIRATION RATE: 18 BRPM | HEART RATE: 101 BPM

## 2020-10-27 DIAGNOSIS — S83.241A ACUTE MEDIAL MENISCUS TEAR OF RIGHT KNEE, INITIAL ENCOUNTER: Primary | ICD-10-CM

## 2020-10-27 DIAGNOSIS — S83.511D COMPLETE TEAR OF ANTERIOR CRUCIATE LIGAMENT OF RIGHT KNEE, SUBSEQUENT ENCOUNTER: ICD-10-CM

## 2020-10-27 DIAGNOSIS — S83.511D COMPLETE TEAR OF ANTERIOR CRUCIATE LIGAMENT OF RIGHT KNEE, SUBSEQUENT ENCOUNTER: Primary | ICD-10-CM

## 2020-10-27 PROCEDURE — 99024 PR POST-OP FOLLOW-UP VISIT: ICD-10-PCS | Mod: S$GLB,,, | Performed by: ORTHOPAEDIC SURGERY

## 2020-10-27 PROCEDURE — 73562 X-RAY EXAM OF KNEE 3: CPT | Mod: 26,RT,, | Performed by: RADIOLOGY

## 2020-10-27 PROCEDURE — 99999 PR PBB SHADOW E&M-EST. PATIENT-LVL III: ICD-10-PCS | Mod: PBBFAC,,, | Performed by: ORTHOPAEDIC SURGERY

## 2020-10-27 PROCEDURE — 99999 PR PBB SHADOW E&M-EST. PATIENT-LVL III: CPT | Mod: PBBFAC,,, | Performed by: ORTHOPAEDIC SURGERY

## 2020-10-27 PROCEDURE — 99024 POSTOP FOLLOW-UP VISIT: CPT | Mod: S$GLB,,, | Performed by: ORTHOPAEDIC SURGERY

## 2020-10-27 PROCEDURE — 73562 XR KNEE 3 VIEW RIGHT: ICD-10-PCS | Mod: 26,RT,, | Performed by: RADIOLOGY

## 2020-10-27 PROCEDURE — 73562 X-RAY EXAM OF KNEE 3: CPT | Mod: TC,FY,PN,RT

## 2020-10-27 NOTE — PROGRESS NOTES
"Pt was scheduled for PT appointment today but cancelled via my chart and reported "other" as cancellation reason.    Tanja Calderon, PT, DPT    "

## 2020-10-27 NOTE — PROGRESS NOTES
Postoperative Visit    History of Present Illness:   Solomon is 5 weeks s/p right ACL reconstruction and Medial meniscus repair  (DOS-09/23/2020)  He presents early today be complaining of pain diffusely throughout the knee radiating into the quadriceps muscle.  He has not had any new swelling, erythema, fevers or chills.  No calf swelling or pain    Physical Examination:    NAD  right lower extremity:  Incisions are well healed  Nontender over the medial and lateral joint lines  Active range of motion 0-90  Brace in place.  He does have his crutches today  Stable Lachmann    Imaging:  Three views of the right knee are negative for acute changes.  There is no fracture.  The tibial tunnel is a bit anterior     Assessment/Plan:  35 y.o. male 5 weeks s/p right ACL reconstruction and Medial meniscus repair  (DOS-09/23/2020)    Plan  1. Offered reassurance and encourage the patient to continue with PT.  He did do a significant amount of stiffness after his injury and required over 6 weeks of rehab prior to his surgery.  His tibial tunnel is a little bit anterior which may be contributing to some of his stiffness but I also think that he does tend to get stiff based on this history.  2. Keep previously scheduled follow-up    All questions were answered in detail. The patient is in full agreement with the treatment plan and will proceed accordingly.

## 2020-11-04 ENCOUNTER — PATIENT OUTREACH (OUTPATIENT)
Dept: ADMINISTRATIVE | Facility: OTHER | Age: 35
End: 2020-11-04

## 2020-11-04 ENCOUNTER — CLINICAL SUPPORT (OUTPATIENT)
Dept: REHABILITATION | Facility: HOSPITAL | Age: 35
End: 2020-11-04
Attending: ORTHOPAEDIC SURGERY
Payer: COMMERCIAL

## 2020-11-04 DIAGNOSIS — M62.81 QUADRICEPS WEAKNESS: ICD-10-CM

## 2020-11-04 DIAGNOSIS — R68.89 DIFFICULTY PARTICIPATING IN SPORTING ACTIVITIES: ICD-10-CM

## 2020-11-04 DIAGNOSIS — G89.18 POST-OPERATIVE PAIN: ICD-10-CM

## 2020-11-04 DIAGNOSIS — R26.2 DIFFICULTY IN WALKING: ICD-10-CM

## 2020-11-04 DIAGNOSIS — M25.561 ACUTE PAIN OF RIGHT KNEE: ICD-10-CM

## 2020-11-04 DIAGNOSIS — M25.661 DECREASED RANGE OF MOTION (ROM) OF RIGHT KNEE: Primary | ICD-10-CM

## 2020-11-04 DIAGNOSIS — M25.469 SWELLING OF KNEE: ICD-10-CM

## 2020-11-04 PROCEDURE — 97112 NEUROMUSCULAR REEDUCATION: CPT

## 2020-11-04 PROCEDURE — 97110 THERAPEUTIC EXERCISES: CPT

## 2020-11-04 NOTE — PROGRESS NOTES
Physical Therapy Treatment Note     Name: Solomon Busby Jr.  Clinic Number: [id number]    Therapy Diagnosis:   Encounter Diagnoses   Name Primary?    Post-operative pain     Decreased range of motion (ROM) of right knee Yes    Difficulty in walking     Difficulty participating in sporting activities     Swelling of knee     Acute pain of right knee     Quadriceps weakness      Physician: Ines Marina MD    Visit Date: 11/4/2020    Physician Orders: PT Eval and Treat   Medical Diagnosis from Referral:   S83.511A (ICD-10-CM) - Rupture of anterior cruciate ligament of right knee, initial encounter   S83.241A (ICD-10-CM) - Acute medial meniscus tear of right knee, initial encounter   Post-op Diagnosis:  Post-Op Diagnosis Codes:     * Complete tear of anterior cruciate ligament of right knee, initial encounter [S83.511A]     * Acute medial meniscus tear of right knee, initial encounter [S83.241A]     * Peripheral tear of lateral meniscus of right knee as current injury, initial encounter [S83.261A]     Evaluation Date: 9/30/2020  Authorization Period Expiration: 9/29/21  Plan of Care Expiration: 12/23/20  Visit #/Visits authorized: 9/20  PN 12/2/20    Time In: 9:30 am  Time Out: 10:49 am   Total Billable Time: 59 minutes    Precautions: Standard and Post surgical    Surgery date: 9/23/2020  6 weeks post op as of 11/4/20    Weight bearing restrictions (specify):  50%     POSTOPERATIVE PLAN: Patient will be non weight bearing for two weeks - weight bearing will be progressed with the brace locked in extension. Range of motion may be full. Plan to follow an ACL allograft rehab protocol. Initial goals include maintenance of full knee extension, regaining flexion, swelling control, and quadriceps activation exercises.     Subjective     Pt reports: he is cleared for full WB now. Knee isn't painful but just feels stiff.   He was compliant with home exercise program.  Response to previous treatment: no adverse  reactions   Functional change: none reported    Pain: 2/10  Location: right knee      Objective     Observation: Ambulates into clinic full WB R LE with Tscope brace and decreased TKE throughout midstance.         Range of Motion:   Knee Left active Right Active   Flexion 130 90   Extension 0 -3         Lower Extremity Strength  Right LE   Left LE     Knee extension: 3/5 Knee extension: 5/5   Knee flexion: 3+/5 Knee flexion: 5/5   Hip extension:  4-/5 Hip extension: 5/5   Hip abduction: 4-/5 Hip abduction: 5/5         Solomon received therapeutic exercises (bold exercises performed) to develop strength, endurance, ROM and flexibility for 49 minutes includin/2 revs recumbent bike x 6 min  Hamstring stretch in standing 3 x 30 sec  gastroc stretch on wedge 3 x 30 sec  TKE with GTB latex free 2 x 10 5 sec hold   Lateral heel tap 4 inch 3 x 10  Step up to SLS 3 x 10 6 inch, 2 sec hold  Bridges 3 x 10  Sit <> stands from chair 3 x 12  SL DL on foam to cone 2 x 10  Heel prop with gastroc stretch overpressure and 10# cuff weight 5 min  Prone hang x 5 min 5#  Prone TKE 3 x 10 5 sec hold 5 # cuff weight on posterior knee  1/2 revolutions on star trac bike x 6 min  +prone hamstring curls 3 x 10 0#   Heel slides 20 x 10 sec  SLR flexion 3 x 10 with 20 quad sets prior to movement  SLR adduction 2 x 10   sidelying hip abduction 3 x 12  3#  Tband PF 4 x 10 Black T  Band   standing  With L  CKC hip abduction, extension, flexion 3 x 10 ea BTB at ankles for flexion and below knee for abd/ext       Solomon received the following manual therapy techniques: Joint mobilizations and Soft tissue Mobilization were applied to the: R knee for 5 minutes, including:  Patella mobs all directions GIII  Knee extension stretching     Solomon participated in neuromuscular re-education activities to improve: Coordination and Proprioception for 10 minutes. The following activities were included:  Russian with quad set with heel prop x10min with 10#  cuff weight on thigh, 10 seconds on 10 seconds off co contract, 30 mA, 50% duty cycle, 2 sec ramp time    Solomon participated in dynamic functional therapeutic activities to improve functional performance for 00  minutes, including:    Solomon participated in gait training to improve functional mobility and safety for 00  minutes, including:    Solomon received cold pack for 10 minutes to R knee.      Home Exercises Provided and Patient Education Provided     Education provided:   - Continued use of HEP        Written Home Exercises Provided: Patient instructed to cont prior HEP.  Exercises were reviewed and Solomon was able to demonstrate them prior to the end of the session.  Solomon demonstrated good  understanding of the education provided.     See EMR under Patient Instructions for exercises provided prior visit.    Assessment   The patient is currently 6 weeks post op R ACL reconstruction with allograft and meniscus repair. He reports being cleared for full WB in his brace and continues to complain of knee stiffness.  He remains limited with ROM at this point but was also having this issue prior to therapy. His current ROM is 90 degrees flexion and - 3 degrees extension. Met 1/5 STG (met HEP goal) and progressing towards ROM and strength goals. LTG's not assessed at this date as pt still has post operative restrictions. Continues to benefit from skilled PT to address post surgical pain, weakness, and decreased ROM to allow for normalized gait pattern and full RTW.     Solomon is progressing well towards his goals.   Pt prognosis is Good.     Pt will continue to benefit from skilled outpatient physical therapy to address the deficits listed in the problem list box on initial evaluation, provide pt/family education and to maximize pt's level of independence in the home and community environment.     Pt's spiritual, cultural and educational needs considered and pt agreeable to plan of care and goals.     Anticipated  barriers to physical therapy: None     Goals:     Short Term Goals: 12 weeks updated 11/4/20  1. The patient with report compliance with HEP to maximize functional outcomes. met  2. The patient will demonstrate R knee AROM >/= 120 flexion and 0 degrees extension to allow for normalized gait pattern. - progressing   3. The patient will demonstrate lateral heel tap from 8 inch step with good form and control indicating improved quad/hip strength.- progressing   4. The patient will ambulate without AD and normalized gait pattern to increase ease of walking between classes at school.- progressing   5. The patient will demonstrate >/= 4/5 bilateral hip abduction and extension MMT to prepare patient for return to running with good form. - progressing      Long Term Goals: 20 weeks unable to formally assess due to post surgical precautions   1. The patient will demonstrate understanding and performance of advanced HEP to allow for independence once discharged from therapy.   2. The patient will initiate straight line running program once cleared by PT/MD.   3. The patient will demonstrate >/= 90% symmetry on Y balance test in all directions to allow for safe return to straight line running.-   4. The patient will demonstrate >/= 90 % symmetry on single leg, triple hop, and crossover hop for safe return to agility in football/basketball. -     Plan     Continue with skilled PT per protocol. Focus on gaining full knee extension.       LEANDER SILVA, PT, DPT   11/4/2020

## 2020-11-05 ENCOUNTER — OFFICE VISIT (OUTPATIENT)
Dept: ORTHOPEDICS | Facility: CLINIC | Age: 35
End: 2020-11-05
Payer: COMMERCIAL

## 2020-11-05 ENCOUNTER — CLINICAL SUPPORT (OUTPATIENT)
Dept: REHABILITATION | Facility: HOSPITAL | Age: 35
End: 2020-11-05
Attending: ORTHOPAEDIC SURGERY
Payer: COMMERCIAL

## 2020-11-05 VITALS
HEIGHT: 67 IN | RESPIRATION RATE: 18 BRPM | DIASTOLIC BLOOD PRESSURE: 80 MMHG | OXYGEN SATURATION: 97 % | SYSTOLIC BLOOD PRESSURE: 122 MMHG | BODY MASS INDEX: 31.35 KG/M2 | WEIGHT: 199.75 LBS | HEART RATE: 86 BPM

## 2020-11-05 DIAGNOSIS — S83.511A RUPTURE OF ANTERIOR CRUCIATE LIGAMENT OF RIGHT KNEE, INITIAL ENCOUNTER: Primary | ICD-10-CM

## 2020-11-05 DIAGNOSIS — G89.18 POST-OPERATIVE PAIN: ICD-10-CM

## 2020-11-05 PROCEDURE — 99999 PR PBB SHADOW E&M-EST. PATIENT-LVL III: CPT | Mod: PBBFAC,,, | Performed by: ORTHOPAEDIC SURGERY

## 2020-11-05 PROCEDURE — 99024 POSTOP FOLLOW-UP VISIT: CPT | Mod: S$GLB,,, | Performed by: ORTHOPAEDIC SURGERY

## 2020-11-05 PROCEDURE — 99024 PR POST-OP FOLLOW-UP VISIT: ICD-10-PCS | Mod: S$GLB,,, | Performed by: ORTHOPAEDIC SURGERY

## 2020-11-05 PROCEDURE — 97112 NEUROMUSCULAR REEDUCATION: CPT | Mod: PN

## 2020-11-05 PROCEDURE — 99999 PR PBB SHADOW E&M-EST. PATIENT-LVL III: ICD-10-PCS | Mod: PBBFAC,,, | Performed by: ORTHOPAEDIC SURGERY

## 2020-11-05 PROCEDURE — 97110 THERAPEUTIC EXERCISES: CPT | Mod: PN

## 2020-11-05 NOTE — PROGRESS NOTES
"Postoperative Visit     History of Present Illness:   Solomon is 6 weeks s/p right ACL reconstruction and Medial meniscus repair  (DOS-09/23/2020)  Continues with therapy. Is making gains in ROM slowly.  Progressed slowly with prehab as well  Pain unchanged  Does have brace today, no crutches     Physical Examination:    Vitals:    11/05/20 1031   BP: 122/80   Pulse: 86   Resp: 18   SpO2: 97%   Weight: 90.6 kg (199 lb 11.8 oz)   Height: 5' 7" (1.702 m)   PainSc:   6   PainLoc: Knee      NAD  right lower extremity:  Incisions are well healed  Nontender over the medial and lateral joint lines  Active range of motion: - 3 -90  No effusion, mild soft tissue swelling   Stable Lachmann     Imaging: No new      Assessment/Plan:  35 y.o. male 6 weeks s/p right ACL reconstruction and Medial meniscus repair  (DOS-09/23/2020)     Plan  1. ROM as tolerated  2. OK to DC brace, WBAT  3. Continue PT/ACL post op plan   4. Not yet able to return to work due to pain, limited motion and weakness of the RLE. Not cleared to squat, climb, lift, push or pull  5. RTC 6 weeks     All questions were answered in detail. The patient is in full agreement with the treatment plan and will proceed accordingly.  "

## 2020-11-05 NOTE — PROGRESS NOTES
Physical Therapy Treatment Note     Name: Solomon Busby Jr.  Clinic Number: [id number]    Therapy Diagnosis:   Encounter Diagnosis   Name Primary?    Post-operative pain      Physician: Ines Marina MD    Visit Date: 2020    Physician Orders: PT Eval and Treat   Medical Diagnosis from Referral:   S83.511A (ICD-10-CM) - Rupture of anterior cruciate ligament of right knee, initial encounter   S83.241A (ICD-10-CM) - Acute medial meniscus tear of right knee, initial encounter   Post-op Diagnosis:  Post-Op Diagnosis Codes:     * Complete tear of anterior cruciate ligament of right knee, initial encounter [S83.511A]     * Acute medial meniscus tear of right knee, initial encounter [S83.241A]     * Peripheral tear of lateral meniscus of right knee as current injury, initial encounter [S83.261A]     Evaluation Date: 2020  Authorization Period Expiration: 21  Plan of Care Expiration: 20  Visit #/Visits authorized:   PN 20    Time In: 11:10 am  Time Out: 12:20 pm  Total Billable Time: 58 minutes    Precautions: Standard and Post surgical    Surgery date: 2020  6 weeks post op as of 20    Cleared for WBAT without brace as of 20.     Subjective     Pt reports: he is cleared for full WB now. Knee isn't painful but just feels stiff.   He was compliant with home exercise program.  Response to previous treatment: no adverse reactions   Functional change: none reported    Pain: 2/10  Location: right knee      Objective     Observation: Ambulates into clinic full WB R LE without brace  decreased TKE throughout midstance.      Solomon received therapeutic exercises (bold exercises performed) to develop strength, endurance, ROM and flexibility for 42 minutes includin/2 revs upright bike x 6 min (initiated session)  Prone hang x 4 min 5#  SLR flexion 3 x 10 with 20 quad sets prior to movement  Sit <> stands 3 x 12 18 inch + airex 15#   Hamstring stretch in standing 3 x 30  sec  gastroc stretch on wedge 3 x 30 sec  +shuttle MVP SL press 3 x 12 1 black  +shuttle MVP double leg 4 x 10- next visit  +double leg calf raises 3 x 10   Heel slides 30 x 10 sec   TKE with GTB latex free 2 x 10 5 sec hold   Heel slides 20 x 10 sec (performed prior to ice)       Solomon received the following manual therapy techniques: Joint mobilizations and Soft tissue Mobilization were applied to the: R knee for 2 minutes, including:  Patella mobs all directions GIII  Knee extension stretching     Solomon participated in neuromuscular re-education activities to improve: Coordination and Proprioception for 14 minutes. The following activities were included:  Lateral heel taps 3 x 10 4 inch   Step up to SLS 3 x 10 6 inch, 2 sec hold  +LE taps standing on foam (ant, lat, post) 12 cycles  +single leg deadlift 2  X 10 10#    Not performed:  Croatian with quad set with heel prop x10min with 10# cuff weight on thigh, 10 seconds on 10 seconds off co contract, 30 mA, 50% duty cycle, 2 sec ramp time    Solomon participated in dynamic functional therapeutic activities to improve functional performance for 00  minutes, including:    Solomon participated in gait training to improve functional mobility and safety for 00  minutes, including:    Solomon received cold pack for 10 minutes to R knee.      Home Exercises Provided and Patient Education Provided     Education provided:   - Continued use of HEP        Written Home Exercises Provided: Patient instructed to cont prior HEP.  Exercises were reviewed and Solomon was able to demonstrate them prior to the end of the session.  Solomon demonstrated good  understanding of the education provided.     See EMR under Patient Instructions for exercises provided prior visit.    Assessment   The patient is cleared for WBAT without brace as of today per chart review at MD visit. Unable to perform full revolutions on upright bike due to limited ROM. LE taps on foam, SL deadlift, lateral heel  taps, and step up to SLS were added to improve neuromuscular control, balance, and proprioception of surgical limb. Difficulty with eccentric control of lateral heel taps from 4 inch step and muscle fasciculations noted during single leg deadlift. Still lacking full ROM into both flexion and extension but in long sit has improved. Mild extensor lag during SLR flexion and lacking TKE during gait. Continues to remain appropriate for skilled PT to address post surgical pain, weakness, and decreased ROM to allow for return to PLOF.     Solomon is progressing well towards his goals.   Pt prognosis is Good.     Pt will continue to benefit from skilled outpatient physical therapy to address the deficits listed in the problem list box on initial evaluation, provide pt/family education and to maximize pt's level of independence in the home and community environment.     Pt's spiritual, cultural and educational needs considered and pt agreeable to plan of care and goals.     Anticipated barriers to physical therapy: None     Goals:     Short Term Goals: 12 weeks updated 11/4/20  1. The patient with report compliance with HEP to maximize functional outcomes. met  2. The patient will demonstrate R knee AROM >/= 120 flexion and 0 degrees extension to allow for normalized gait pattern. - progressing   3. The patient will demonstrate lateral heel tap from 8 inch step with good form and control indicating improved quad/hip strength.- progressing   4. The patient will ambulate without AD and normalized gait pattern to increase ease of walking between classes at school.- progressing   5. The patient will demonstrate >/= 4/5 bilateral hip abduction and extension MMT to prepare patient for return to running with good form. - progressing      Long Term Goals: 20 weeks unable to formally assess due to post surgical precautions   1. The patient will demonstrate understanding and performance of advanced HEP to allow for independence once  discharged from therapy.   2. The patient will initiate straight line running program once cleared by PT/MD.   3. The patient will demonstrate >/= 90% symmetry on Y balance test in all directions to allow for safe return to straight line running.-   4. The patient will demonstrate >/= 90 % symmetry on single leg, triple hop, and crossover hop for safe return to agility in football/basketball. -     Plan     Continue with skilled PT per protocol. Focus on gaining full knee extension.       LEANDER SILVA, PT, DPT   11/5/2020

## 2020-11-10 ENCOUNTER — DOCUMENTATION ONLY (OUTPATIENT)
Dept: REHABILITATION | Facility: HOSPITAL | Age: 35
End: 2020-11-10

## 2020-11-10 NOTE — PROGRESS NOTES
The patient cancelled his appointment on 11/10/20 at 11:15 am secondary to appointment time no longer works. His next scheduled appointment is 11/12/20.    Tanja Calderon, PT, DPT

## 2020-11-12 ENCOUNTER — CLINICAL SUPPORT (OUTPATIENT)
Dept: REHABILITATION | Facility: HOSPITAL | Age: 35
End: 2020-11-12
Attending: ORTHOPAEDIC SURGERY
Payer: COMMERCIAL

## 2020-11-12 DIAGNOSIS — G89.18 POST-OPERATIVE PAIN: ICD-10-CM

## 2020-11-12 PROCEDURE — 97110 THERAPEUTIC EXERCISES: CPT | Mod: PN

## 2020-11-12 PROCEDURE — 97112 NEUROMUSCULAR REEDUCATION: CPT | Mod: PN

## 2020-11-12 NOTE — PROGRESS NOTES
Physical Therapy Treatment Note     Name: Solomon Busby Jr.  Clinic Number: [id number]    Therapy Diagnosis:   Encounter Diagnosis   Name Primary?    Post-operative pain      Physician: Ines Marina MD    Visit Date: 2020    Physician Orders: PT Eval and Treat   Medical Diagnosis from Referral:   S83.511A (ICD-10-CM) - Rupture of anterior cruciate ligament of right knee, initial encounter   S83.241A (ICD-10-CM) - Acute medial meniscus tear of right knee, initial encounter   Post-op Diagnosis:  Post-Op Diagnosis Codes:     * Complete tear of anterior cruciate ligament of right knee, initial encounter [S83.511A]     * Acute medial meniscus tear of right knee, initial encounter [S83.241A]     * Peripheral tear of lateral meniscus of right knee as current injury, initial encounter [S83.261A]     Evaluation Date: 2020  Authorization Period Expiration: 21  Plan of Care Expiration: 20  Visit #/Visits authorized:   PN 20    Time In: 11:15 am  Time Out: 12:23 pm  Total Billable Time: 53 minutes    Precautions: Standard and Post surgical    Surgery date: 2020  6 weeks post op as of 20    Cleared for WBAT without brace as of 20.     Subjective     Pt reports: cant seem to get rid of knee stiffness.  He was compliant with home exercise program.  Response to previous treatment: no adverse reactions   Functional change: none reported    Pain: 2/10  Location: right knee      Objective     Observation: Ambulates into clinic full WB R LE without brace  decreased TKE throughout midstance.      Solomon received therapeutic exercises (bold exercises performed) to develop strength, endurance, ROM and flexibility for 39 minutes includin/2 revs upright bike x 7 min (initiated session)  Heel slides 20 x 10 sec with moderate overpressure from PT  Prone hang x 4 min 5#  gastroc stretch on wedge 3 x 30 sec  Quad set with heel prop 4 x 10  Physioball wall squats 3 x 10 3 second pause  at bottom   SLR flexion 3 x 10 with 20 quad sets prior to movement  Sit <> stands 3 x 12 18 inch + airex 15#   Hamstring stretch in supine 3 x 30 sec  gastroc stretch on wedge 3 x 30 sec  shuttle MVP SL press 3 x 15 1 black/1 red  shuttle MVP double leg 4 x 10 3 black/1 red  double leg calf raises 3 x 15 1 black cord  Solomon received the following manual therapy techniques: Joint mobilizations and Soft tissue Mobilization were applied to the: R knee for 2 minutes, including:  Patella mobs all directions GIII  Knee extension stretching     Solomon participated in neuromuscular re-education activities to improve: Coordination and Proprioception for 12 minutes. The following activities were included:  Lateral heel taps 3 x 10 4 inch   +TKE with march purple band 3 x 10  Step up to SLS 3 x 10 6 inch, 2 sec hold with 15# in R  LE taps standing on foam (ant, lat, post) 12 cycles  single leg deadlift 2  X 10 10#    Not performed:  [place] with quad set with heel prop x10min with 10# cuff weight on thigh, 10 seconds on 10 seconds off co contract, 30 mA, 50% duty cycle, 2 sec ramp time    Solomon participated in dynamic functional therapeutic activities to improve functional performance for 00  minutes, including:    Solomon participated in gait training to improve functional mobility and safety for 00  minutes, including:    Solomon received cold pack for 10 minutes to R knee.      Home Exercises Provided and Patient Education Provided     Education provided:   - Continued use of HEP        Written Home Exercises Provided: Patient instructed to cont prior HEP.  Exercises were reviewed and Solomon was able to demonstrate them prior to the end of the session.  Solomon demonstrated good  understanding of the education provided.     See EMR under Patient Instructions for exercises provided prior visit.    Assessment   The patient demonstrates 100 degrees knee flexion during AAROM which is an improvement from 90 degrees which pt  measured on 11/4/20. He continues to lack TKE throughout midstance and achieving -5 degrees AROM knee extension today after stretching. Able to demonstrate equal WB during physioball wall squats. TKE with march added to further improve neuro muscular coordination of R quad and improve knee extension during gait. Pain reported with single and double leg press on shuttle due to knee stiffness but this did not limit performance.    Solomon is progressing well towards his goals.   Pt prognosis is Good.     Pt will continue to benefit from skilled outpatient physical therapy to address the deficits listed in the problem list box on initial evaluation, provide pt/family education and to maximize pt's level of independence in the home and community environment.     Pt's spiritual, cultural and educational needs considered and pt agreeable to plan of care and goals.     Anticipated barriers to physical therapy: None     Goals:     Short Term Goals: 12 weeks updated 11/4/20  1. The patient with report compliance with HEP to maximize functional outcomes. met  2. The patient will demonstrate R knee AROM >/= 120 flexion and 0 degrees extension to allow for normalized gait pattern. - progressing   3. The patient will demonstrate lateral heel tap from 8 inch step with good form and control indicating improved quad/hip strength.- progressing   4. The patient will ambulate without AD and normalized gait pattern to increase ease of walking between classes at school.- progressing   5. The patient will demonstrate >/= 4/5 bilateral hip abduction and extension MMT to prepare patient for return to running with good form. - progressing      Long Term Goals: 20 weeks unable to formally assess due to post surgical precautions   1. The patient will demonstrate understanding and performance of advanced HEP to allow for independence once discharged from therapy.   2. The patient will initiate straight line running program once cleared by  PT/MD.   3. The patient will demonstrate >/= 90% symmetry on Y balance test in all directions to allow for safe return to straight line running.-   4. The patient will demonstrate >/= 90 % symmetry on single leg, triple hop, and crossover hop for safe return to agility in football/basketball. -     Plan     Continue with skilled PT per protocol. Focus on gaining full knee extension.       LEANDER SILVA, PT, DPT   11/12/2020

## 2020-11-13 ENCOUNTER — DOCUMENTATION ONLY (OUTPATIENT)
Dept: REHABILITATION | Facility: HOSPITAL | Age: 35
End: 2020-11-13

## 2020-11-13 NOTE — PROGRESS NOTES
Physical Therapy     The patient was scheduled for therapy visit on 11/13/20 at 9:45 am but cancelled via my chart secondary to appointment time no longer works. Called patient and left voicemail to reschedule another time this date but unable to reach patient. Next appointment scheduled 11/17/20.    Tanja Calderon, PT, DPT

## 2020-11-17 ENCOUNTER — TELEPHONE (OUTPATIENT)
Dept: REHABILITATION | Facility: HOSPITAL | Age: 35
End: 2020-11-17

## 2020-11-17 NOTE — TELEPHONE ENCOUNTER
Called patient and left voicemail to discuss missed appointment on 11/17/2020. Informed him when the next appointment is scheduled.

## 2020-11-19 ENCOUNTER — CLINICAL SUPPORT (OUTPATIENT)
Dept: REHABILITATION | Facility: HOSPITAL | Age: 35
End: 2020-11-19
Payer: COMMERCIAL

## 2020-11-19 DIAGNOSIS — G89.18 POST-OPERATIVE PAIN: ICD-10-CM

## 2020-11-19 PROCEDURE — 97110 THERAPEUTIC EXERCISES: CPT | Mod: PN

## 2020-11-19 PROCEDURE — 97140 MANUAL THERAPY 1/> REGIONS: CPT | Mod: PN

## 2020-11-19 PROCEDURE — 97112 NEUROMUSCULAR REEDUCATION: CPT | Mod: PN

## 2020-11-19 NOTE — PROGRESS NOTES
Physical Therapy Treatment Note     Name: Solomon Busby Jr.  Clinic Number: [id number]    Therapy Diagnosis:   Encounter Diagnosis   Name Primary?    Post-operative pain      Physician: Ines Marina MD    Visit Date: 11/19/2020    Physician Orders: PT Eval and Treat   Medical Diagnosis from Referral:   S83.511A (ICD-10-CM) - Rupture of anterior cruciate ligament of right knee, initial encounter   S83.241A (ICD-10-CM) - Acute medial meniscus tear of right knee, initial encounter   Post-op Diagnosis:  Post-Op Diagnosis Codes:     * Complete tear of anterior cruciate ligament of right knee, initial encounter [S83.511A]     * Acute medial meniscus tear of right knee, initial encounter [S83.241A]     * Peripheral tear of lateral meniscus of right knee as current injury, initial encounter [S83.261A]     Evaluation Date: 9/30/2020  Authorization Period Expiration: 9/29/21  Plan of Care Expiration: 12/23/20  Visit #/Visits authorized: 12/20  PN 12/2/20    Time In: 12:05 pm  Time Out: 1:10 pm  Total Billable Time: 59 minutes    Precautions: Standard and Post surgical    Surgery date: 9/23/2020  8 weeks post op as of 11/18/20      Subjective     Pt reports: he has been having back spasms intermittently and states he has suffered with these since he was 18.   He was compliant with home exercise program.  Response to previous treatment: no adverse reactions   Functional change: none reported    Pain: 2/10  Location: right knee      Objective     Knee Hyperext/ext/flx AROM:   0  / 2   / 100 degrees         Solomon received therapeutic exercises (bold exercises performed) to develop strength, endurance, ROM and flexibility for 26 minutes including:  Upright bike full revolutions  Quad set 30 x 5 sec  SLR flexion 3 x 10   Squats to 18 inch 3 x 12 20#   +machine hamstring curls 3 x 10 45# double leg  +Knee extension isometrics at 90 degrees (into physioball) 10 x 10 sec holds  Knee extension 90-45 degrees with weight next  visit   Prone quad stretch 3 x 30 sec  Supine hamstring stretch 3 x 30 sec      Solomon received the following manual therapy techniques: Joint mobilizations and Soft tissue Mobilization were applied to the: R knee for 15 minutes, including:  Patella mobs all directions GIII  Knee extension stretching   Cupping to distal quad and patellar tendon, R lateral  Portal site     Solomon participated in neuromuscular re-education activities to improve: Coordination and Proprioception for 18 minutes. The following activities were included:  Lateral heel taps 3 x 10 6 inch   +forward heel taps   TKE with march purple band 3 x 10  Step up to SLS 3 x 10 6 inch + airex , 2 sec hold with  2-15# kettlebells   +SLS on foam with ball toss to rebounder 1 x 30 6# ball       Solomon received cold pack for 00 minutes to R knee.      Home Exercises Provided and Patient Education Provided     Education provided:   - Continued use of HEP        Written Home Exercises Provided: Patient instructed to cont prior HEP.  Exercises were reviewed and Solomon was able to demonstrate them prior to the end of the session.  Solomon demonstrated good  understanding of the education provided.     See EMR under Patient Instructions for exercises provided prior visit.    Assessment   The patient returns to therapy one week since his last visit with one cancel and one no show since that time. He continues to ambulate with bent knee gait and manual therapy interventions and stretching continued to restore knee extension ROM. He was able to achieve -2 degrees extension by end of visit. He did improve knee flexion to 100 degrees since last re-assessment and able to perform full revolutions on the bike. Forward heel taps were added and lateral heel taps continued to improve neuromuscular quad control in CKC. He was able to perform forward step ups with step + foam and SLS on foam with ball toss with moderate difficulty maintaining balance. Did demonstrate improved  TKE during midstance phase of gait by end of visit but not equal to contralateral limb. Continues to benefit from skilled PT to address post surgical weakness and decreased ROM to allow pt to return to PLOF.     Solomon is progressing well towards his goals.   Pt prognosis is Good.     Pt will continue to benefit from skilled outpatient physical therapy to address the deficits listed in the problem list box on initial evaluation, provide pt/family education and to maximize pt's level of independence in the home and community environment.     Pt's spiritual, cultural and educational needs considered and pt agreeable to plan of care and goals.     Anticipated barriers to physical therapy: None     Goals:     Short Term Goals: 12 weeks updated 11/4/20  1. The patient with report compliance with HEP to maximize functional outcomes. met  2. The patient will demonstrate R knee AROM >/= 120 flexion and 0 degrees extension to allow for normalized gait pattern. - progressing   3. The patient will demonstrate lateral heel tap from 8 inch step with good form and control indicating improved quad/hip strength.- progressing   4. The patient will ambulate without AD and normalized gait pattern to increase ease of walking between classes at school.- progressing   5. The patient will demonstrate >/= 4/5 bilateral hip abduction and extension MMT to prepare patient for return to running with good form. - progressing      Long Term Goals: 20 weeks unable to formally assess due to post surgical precautions   1. The patient will demonstrate understanding and performance of advanced HEP to allow for independence once discharged from therapy.   2. The patient will initiate straight line running program once cleared by PT/MD.   3. The patient will demonstrate >/= 90% symmetry on Y balance test in all directions to allow for safe return to straight line running.-   4. The patient will demonstrate >/= 90 % symmetry on single leg, triple hop,  and crossover hop for safe return to agility in football/basketball. -     Plan     Continue with skilled PT per protocol. Focus on gaining full knee extension.       LEANDER SILVA, PT, DPT   11/19/2020

## 2020-11-20 ENCOUNTER — CLINICAL SUPPORT (OUTPATIENT)
Dept: REHABILITATION | Facility: HOSPITAL | Age: 35
End: 2020-11-20
Payer: COMMERCIAL

## 2020-11-20 DIAGNOSIS — R68.89 DIFFICULTY PARTICIPATING IN SPORTING ACTIVITIES: ICD-10-CM

## 2020-11-20 DIAGNOSIS — R26.2 DIFFICULTY IN WALKING: ICD-10-CM

## 2020-11-20 DIAGNOSIS — M62.81 QUADRICEPS WEAKNESS: ICD-10-CM

## 2020-11-20 DIAGNOSIS — M25.661 DECREASED RANGE OF MOTION (ROM) OF RIGHT KNEE: Primary | ICD-10-CM

## 2020-11-20 DIAGNOSIS — G89.18 POST-OPERATIVE PAIN: ICD-10-CM

## 2020-11-20 PROCEDURE — 97110 THERAPEUTIC EXERCISES: CPT | Mod: PN

## 2020-11-20 PROCEDURE — 97112 NEUROMUSCULAR REEDUCATION: CPT | Mod: PN

## 2020-11-20 NOTE — PROGRESS NOTES
Physical Therapy Treatment Note     Name: Solomon Busby Jr.  Clinic Number: [id number]    Therapy Diagnosis:   Encounter Diagnoses   Name Primary?    Post-operative pain     Decreased range of motion (ROM) of right knee Yes    Difficulty in walking     Difficulty participating in sporting activities     Quadriceps weakness      Physician: Ines Marina MD    Visit Date: 11/20/2020    Physician Orders: PT Eval and Treat   Medical Diagnosis from Referral:   S83.511A (ICD-10-CM) - Rupture of anterior cruciate ligament of right knee, initial encounter   S83.241A (ICD-10-CM) - Acute medial meniscus tear of right knee, initial encounter   Post-op Diagnosis:  Post-Op Diagnosis Codes:     * Complete tear of anterior cruciate ligament of right knee, initial encounter [S83.511A]     * Acute medial meniscus tear of right knee, initial encounter [S83.241A]     * Peripheral tear of lateral meniscus of right knee as current injury, initial encounter [S83.261A]     Evaluation Date: 9/30/2020  Authorization Period Expiration: 9/29/21  Plan of Care Expiration: 12/23/20  Visit #/Visits authorized: 13/20  PN 12/2/20    Time In: 3:21 pm  Time Out: 4:10 pm  Total Billable Time: 49 minutes    Precautions: Standard and Post surgical    Surgery date: 9/23/2020  8 weeks post op as of 11/18/20      Subjective     Pt reports: continued knee stiffness  He was compliant with home exercise program.  Response to previous treatment: no adverse reactions   Functional change: none reported    Pain: 2/10  Location: right knee      Objective     Knee Hyperext/ext/flx AROM:   0  / 2   / 100 degrees   105 PROM flexion      Solomon received therapeutic exercises (bold exercises performed) to develop strength, endurance, ROM and flexibility for 39 minutes including:  Upright bike full revolutions x 5 min star trac  Quad set 30 x 5 sec  SLR flexion 3 x 10   Squats to 18 inch 3 x 12 20#   +machine hamstring curls 3 x 10 45# double leg  +Knee  extension isometrics at 90 degrees (into physioball) 10 x 10 sec holds  Knee extension 90-45 degrees with weight next visit   Prone quad stretch 3 x 30 sec  Supine hamstring stretch 3 x 30 sec  +trap bar deadlift 1 x 10 45#, 1 x 10 85#, 2 x 10 95#  Shuttle SL press 3 x 15 1 black 1 red   Heel prop x 8 min 14#    Solomon received the following manual therapy techniques: Joint mobilizations and Soft tissue Mobilization were applied to the: R knee for 00 minutes, including:  Patella mobs all directions GIII  Knee extension stretching   Cupping to distal quad and patellar tendon, R lateral  Portal site     Solomon participated in neuromuscular re-education activities to improve: Coordination and Proprioception for 10 minutes. The following activities were included:  Lateral heel taps 3 x 10 6 inch   forward heel taps 4 inch 3 x 10  TKE with march purple band 4 x 10  +quad set with knee extension stretch in standing using purple cook band 4 x 10 3 sec hold  Step up to SLS 3 x 10 6 inch + airex , 2 sec hold with  2-15# kettlebells   +SLS on foam with ball toss to rebounder 1 x 30 6# ball       Solomon received cold pack for 00 minutes to R knee.      Home Exercises Provided and Patient Education Provided     Education provided:   - Continued use of HEP        Written Home Exercises Provided: Patient instructed to cont prior HEP.  Exercises were reviewed and Solomon was able to demonstrate them prior to the end of the session.  Solomon demonstrated good  understanding of the education provided.     See EMR under Patient Instructions for exercises provided prior visit.    Assessment   The patient demonstrated PROM knee flexion 105 degrees with end range pain at superior incision site, likely related to scar tissue. Continues to ambulate with decreased TKE throughout midstance phase of gait that is not complete but improved at end of therapy visit. LBP elicited with trap bar deadlifts today.     Solomon is progressing well towards  his goals.   Pt prognosis is Good.     Pt will continue to benefit from skilled outpatient physical therapy to address the deficits listed in the problem list box on initial evaluation, provide pt/family education and to maximize pt's level of independence in the home and community environment.     Pt's spiritual, cultural and educational needs considered and pt agreeable to plan of care and goals.     Anticipated barriers to physical therapy: None     Goals:     Short Term Goals: 12 weeks updated 11/4/20  1. The patient with report compliance with HEP to maximize functional outcomes. met  2. The patient will demonstrate R knee AROM >/= 120 flexion and 0 degrees extension to allow for normalized gait pattern. - progressing   3. The patient will demonstrate lateral heel tap from 8 inch step with good form and control indicating improved quad/hip strength.- progressing   4. The patient will ambulate without AD and normalized gait pattern to increase ease of walking between classes at school.- progressing   5. The patient will demonstrate >/= 4/5 bilateral hip abduction and extension MMT to prepare patient for return to running with good form. - progressing      Long Term Goals: 20 weeks unable to formally assess due to post surgical precautions   1. The patient will demonstrate understanding and performance of advanced HEP to allow for independence once discharged from therapy.   2. The patient will initiate straight line running program once cleared by PT/MD.   3. The patient will demonstrate >/= 90% symmetry on Y balance test in all directions to allow for safe return to straight line running.-   4. The patient will demonstrate >/= 90 % symmetry on single leg, triple hop, and crossover hop for safe return to agility in football/basketball. -     Plan     Continue with skilled PT per protocol. Focus on gaining full knee extension.       LEANDER SILVA, PT, DPT   11/20/2020

## 2020-11-23 ENCOUNTER — CLINICAL SUPPORT (OUTPATIENT)
Dept: REHABILITATION | Facility: HOSPITAL | Age: 35
End: 2020-11-23
Payer: COMMERCIAL

## 2020-11-23 DIAGNOSIS — G89.18 POST-OPERATIVE PAIN: ICD-10-CM

## 2020-11-23 PROCEDURE — 97110 THERAPEUTIC EXERCISES: CPT | Mod: PN

## 2020-11-23 PROCEDURE — 97112 NEUROMUSCULAR REEDUCATION: CPT | Mod: PN

## 2020-11-23 NOTE — PROGRESS NOTES
Physical Therapy Treatment Note     Name: Solomon Busby Jr.  Clinic Number: [id number]    Therapy Diagnosis:   Encounter Diagnosis   Name Primary?    Post-operative pain      Physician: Ines Marina MD    Visit Date: 11/23/2020    Physician Orders: PT Eval and Treat   Medical Diagnosis from Referral:   S83.511A (ICD-10-CM) - Rupture of anterior cruciate ligament of right knee, initial encounter   S83.241A (ICD-10-CM) - Acute medial meniscus tear of right knee, initial encounter   Post-op Diagnosis:  Post-Op Diagnosis Codes:     * Complete tear of anterior cruciate ligament of right knee, initial encounter [S83.511A]     * Acute medial meniscus tear of right knee, initial encounter [S83.241A]     * Peripheral tear of lateral meniscus of right knee as current injury, initial encounter [S83.261A]     Evaluation Date: 9/30/2020  Authorization Period Expiration: 9/29/21  Plan of Care Expiration: 12/23/20  Visit #/Visits authorized: 13/20  PN 12/2/20    Time In: 9:17 am  Time Out: 10:15 am   Total Billable Time: 54 minutes    Precautions: Standard and Post surgical    Surgery date: 9/23/2020  9 weeks post op as of 11/23/20      Subjective     Pt reports: continued knee stiffness.  He was compliant with home exercise program.  Response to previous treatment: no adverse reactions   Functional change: none reported    Pain: 2/10  Location: right knee      Objective     Knee Hyperext/ext/flx AROM:   0  / 2   / 100 degrees   105 PROM flexion      Solomon received therapeutic exercises (bold exercises performed) to develop strength, endurance, ROM and flexibility for 38 minutes including:  Upright bike full revolutions x 5 min sci fit level 6   Long sit hamstring stretch with heel prop 2 x 1 min  Prone quad stretch 2 x 1 min  Squats (depth to tolerance) 3 x 10 15#  SLR flexion 3 x 10   machine hamstring curls 3 x 10 35# double leg  Knee extension isometrics at 90 degrees (into physioball) 20 x 10 sec holds  Knee  extension 90-45 degrees 3 x 10 8 #  +trap bar deadlift 1 x 10 45#, 1 x 10 85#, 2 x 10 95#  Shuttle SL press 4 x 10 2 black   Side steps with band (ankles) 2 x 20 ft ea GTB  Zig zag steps with band (ankles) 2 x 20 ft ea GTB  Heel prop x 10 min 15#    Solomon received the following manual therapy techniques: Joint mobilizations and Soft tissue Mobilization were applied to the: R knee for 2 minutes, including:  Patella mobs all directions GIII  Knee extension stretching   Cupping to distal quad and patellar tendon, R lateral  Portal site     Solomon participated in neuromuscular re-education (bold exercises performed) activities to improve: Coordination and Proprioception for 14 minutes. The following activities were included:  Lateral heel taps 3 x 10 6 inch   forward heel taps  + posterior toe tap 4 inch 2 x 8   TKE with purple band 20 x (with knee extension stretch)   TKE with march purple band 3 x 10 (with knee ext stretch)   +static split squat with R foot in front and foam roller for visual cue 2 x 10   Step up to SLS 3 x 10 6 inch + airex , 2 sec hold with  2-15# kettlebells   +SLS on foam with ball toss to rebounder 3 x 30 6# ball       Solomon received cold pack for 00 minutes to R knee.      Home Exercises Provided and Patient Education Provided     Education provided:   - Continued use of HEP        Written Home Exercises Provided: Patient instructed to cont prior HEP.  Exercises were reviewed and Solomon was able to demonstrate them prior to the end of the session.  Solomon demonstrated good  understanding of the education provided.     See EMR under Patient Instructions for exercises provided prior visit.    Assessment   The patient was challenged today with addition of static split squat with R foot in front and demonstrated difficulty performing CKC anterior tibial translation secondary to knee stiffness and decreased eccentric quad control. Banded TKE activities with knee in extension stretch continued to  improve TKE during midstance phase of gait. He required cueing to ensure equal  Weight bearing during squats and ensure he was driving through his heel during shuttle. Tolerated 90-45 degree OKC knee extension using 8# with mild patellar pain and isometrics at 90 degrees continued to focus on quad strength within post operative precautions. He continues to demonstrate decreased knee AROM into both flexion and extension but improved at end of session with exercises/stretching. Trap bar and single leg deadlifts held today second to low back pain.   Solomon is progressing well towards his goals.   Pt prognosis is Good.     Pt will continue to benefit from skilled outpatient physical therapy to address the deficits listed in the problem list box on initial evaluation, provide pt/family education and to maximize pt's level of independence in the home and community environment.     Pt's spiritual, cultural and educational needs considered and pt agreeable to plan of care and goals.     Anticipated barriers to physical therapy: None     Goals:     Short Term Goals: 12 weeks updated 11/4/20  1. The patient with report compliance with HEP to maximize functional outcomes. met  2. The patient will demonstrate R knee AROM >/= 120 flexion and 0 degrees extension to allow for normalized gait pattern. - progressing   3. The patient will demonstrate lateral heel tap from 8 inch step with good form and control indicating improved quad/hip strength.- progressing   4. The patient will ambulate without AD and normalized gait pattern to increase ease of walking between classes at school.- progressing   5. The patient will demonstrate >/= 4/5 bilateral hip abduction and extension MMT to prepare patient for return to running with good form. - progressing      Long Term Goals: 20 weeks unable to formally assess due to post surgical precautions   1. The patient will demonstrate understanding and performance of advanced HEP to allow for  independence once discharged from therapy.   2. The patient will initiate straight line running program once cleared by PT/MD.   3. The patient will demonstrate >/= 90% symmetry on Y balance test in all directions to allow for safe return to straight line running.-   4. The patient will demonstrate >/= 90 % symmetry on single leg, triple hop, and crossover hop for safe return to agility in football/basketball. -     Plan     Continue with skilled PT per protocol. Focus on gaining full knee extension.       LEANDER SILVA, PT, DPT   11/23/2020

## 2020-12-01 ENCOUNTER — DOCUMENTATION ONLY (OUTPATIENT)
Dept: REHABILITATION | Facility: HOSPITAL | Age: 35
End: 2020-12-01

## 2020-12-01 NOTE — PROGRESS NOTES
Physical Therapy    The patient called to cancel his appointment today secondary to being stuck in the airport in [place]. He was able to be rescheduled to 12/2/20.    Tanja Calderon, PT, DPT.

## 2020-12-02 ENCOUNTER — CLINICAL SUPPORT (OUTPATIENT)
Dept: REHABILITATION | Facility: HOSPITAL | Age: 35
End: 2020-12-02
Payer: COMMERCIAL

## 2020-12-02 DIAGNOSIS — G89.18 POST-OPERATIVE PAIN: ICD-10-CM

## 2020-12-02 PROCEDURE — 97110 THERAPEUTIC EXERCISES: CPT | Mod: PN

## 2020-12-02 PROCEDURE — 97112 NEUROMUSCULAR REEDUCATION: CPT | Mod: PN

## 2020-12-02 NOTE — PROGRESS NOTES
Physical Therapy Treatment Note     Name: Solomon Busby Jr.  Clinic Number: [id number]    Therapy Diagnosis:   Encounter Diagnosis   Name Primary?    Post-operative pain      Physician: Ines Marina MD    Visit Date: 12/2/2020    Physician Orders: PT Eval and Treat   Medical Diagnosis from Referral:   S83.511A (ICD-10-CM) - Rupture of anterior cruciate ligament of right knee, initial encounter   S83.241A (ICD-10-CM) - Acute medial meniscus tear of right knee, initial encounter   Post-op Diagnosis:  Post-Op Diagnosis Codes:     * Complete tear of anterior cruciate ligament of right knee, initial encounter [S83.511A]     * Acute medial meniscus tear of right knee, initial encounter [S83.241A]     * Peripheral tear of lateral meniscus of right knee as current injury, initial encounter [S83.261A]     Evaluation Date: 9/30/2020  Authorization Period Expiration: 9/29/21  Plan of Care Expiration: 12/23/20  Visit #/Visits authorized: 14/20  PN 12/2/20    Time In: 1:15 pm  Time Out: 2:08 pm  Total Billable Time: 46 minutes    Precautions: Standard and Post surgical    Surgery date: 9/23/2020  10 weeks post op as of 11/30/20      Subjective     [facility] reports[facility] [place] [facility]. States his knee is stiff from the cold weather.   He was compliant with home exercise program.  Response to previous treatment: no adverse reactions   Functional change: none reported    Pain: 2/10  Location: right knee      Objective     Knee Hyperext/ext/flx AROM:   0  / 2   / 100 degrees   105 PROM flexion      Solomon received therapeutic exercises (bold exercises performed) to develop strength, endurance, ROM and flexibility for 40 minutes including:  Retro walking treadmill next visit   Upright bike full revolutions x 5 min sci fit level 6   Long sit hamstring stretch with heel prop 2 x 1 min  Prone quad stretch 2 x 1 min  SLR flexion 3 x 10   Prone hang x 6 min 6#  LAQ 3 x 10 5#  Squats (depth to  tolerance) 3 x 10 15#  machine hamstring curls 3 x 10 35# double leg (single leg next visit)  Shuttle SL press 4 x 10 2 black   Side steps with band (ankles) 2 x 20 ft ea GTB  Zig zag steps with band (ankles) 2 x 20 ft ea GTB  Heel prop x 10 min 15#    Solomon received the following manual therapy techniques: Joint mobilizations and Soft tissue Mobilization were applied to the: R knee for 00 minutes, including:  Patella mobs all directions GIII  Knee extension stretching   Cupping to distal quad and patellar tendon, R lateral  Portal site     Solomon participated in neuromuscular re-education (bold exercises performed) activities to improve: Coordination and Proprioception for 6 minutes. The following activities were included:  SL deadlift on foam 3 x 10 0#  Lateral heel taps 3 x 10 6 inch   forward heel taps  + posterior toe tap 4 inch 2 x 8   TKE with purple band 20 x (with knee extension stretch)   TKE with march purple band 3 x 10 (with knee ext stretch)   +static split squat with R foot in front and foam roller for visual cue 2 x 10   Step up to SLS 3 x 10 6 inch + airex , 2 sec hold with  2-15# kettlebells   SLS on foam with ball toss to rebounder 3 x 30 6# ball       Solomon received cold pack for 00 minutes to R knee.      Home Exercises Provided and Patient Education Provided     Education provided:   - Continued use of HEP        Written Home Exercises Provided: Patient instructed to cont prior HEP.  Exercises were reviewed and Solomon was able to demonstrate them prior to the end of the session.  Solomon demonstrated good  understanding of the education provided.     See EMR under Patient Instructions for exercises provided prior visit.    Assessment   The patient presented to clinic today after going out of town to [place]. He reported that he attempted skiing and PT educated patient that is not cleared for sport activities until given clearance by MD. He continues to demonstrate decreased TKE throughout  midstance and mild lag with SLR despite extension stretching/prone hangs. Muscle fasciculations noted during SL deadlift on foam due to LE weakness and poor neuromuscular control. Initiated full ROM knee extension and he was able to perform using 5#. Step up height increased to 8 inches and he required cueing to decreased speed of eccentric portion of movement for improved form. Continues to remain appropriate for skilled PT to normalize gait pattern and improve post operative strenght/neuromuscular control of R knee.   Solomon is progressing well towards his goals.   Pt prognosis is Good.     Pt will continue to benefit from skilled outpatient physical therapy to address the deficits listed in the problem list box on initial evaluation, provide pt/family education and to maximize pt's level of independence in the home and community environment.     Pt's spiritual, cultural and educational needs considered and pt agreeable to plan of care and goals.     Anticipated barriers to physical therapy: None     Goals:     Short Term Goals: 12 weeks updated 11/4/20  1. The patient with report compliance with HEP to maximize functional outcomes. met  2. The patient will demonstrate R knee AROM >/= 120 flexion and 0 degrees extension to allow for normalized gait pattern. - progressing   3. The patient will demonstrate lateral heel tap from 8 inch step with good form and control indicating improved quad/hip strength.- progressing   4. The patient will ambulate without AD and normalized gait pattern to increase ease of walking between classes at school.- progressing   5. The patient will demonstrate >/= 4/5 bilateral hip abduction and extension MMT to prepare patient for return to running with good form. - progressing      Long Term Goals: 20 weeks unable to formally assess due to post surgical precautions   1. The patient will demonstrate understanding and performance of advanced HEP to allow for independence once discharged  from therapy.   2. The patient will initiate straight line running program once cleared by PT/MD.   3. The patient will demonstrate >/= 90% symmetry on Y balance test in all directions to allow for safe return to straight line running.-   4. The patient will demonstrate >/= 90 % symmetry on single leg, triple hop, and crossover hop for safe return to agility in football/basketball. -     Plan     Continue with skilled PT per protocol. Focus on gaining full knee extension. PN next visit.       LEANDER SILVA, PT, DPT   12/2/2020

## 2020-12-03 ENCOUNTER — DOCUMENTATION ONLY (OUTPATIENT)
Dept: REHABILITATION | Facility: HOSPITAL | Age: 35
End: 2020-12-03

## 2020-12-03 NOTE — PROGRESS NOTES
Physical Therapy    Pt cancelled his therapy appointment today. Scheduled 12/4/20 for next follow up.     Tanja Calderon, PT, DPT

## 2020-12-04 ENCOUNTER — CLINICAL SUPPORT (OUTPATIENT)
Dept: REHABILITATION | Facility: HOSPITAL | Age: 35
End: 2020-12-04
Payer: COMMERCIAL

## 2020-12-04 DIAGNOSIS — M62.81 QUADRICEPS WEAKNESS: ICD-10-CM

## 2020-12-04 DIAGNOSIS — M25.661 DECREASED RANGE OF MOTION (ROM) OF RIGHT KNEE: Primary | ICD-10-CM

## 2020-12-04 DIAGNOSIS — G89.18 POST-OPERATIVE PAIN: ICD-10-CM

## 2020-12-04 PROCEDURE — 97112 NEUROMUSCULAR REEDUCATION: CPT | Mod: PN

## 2020-12-04 PROCEDURE — 97110 THERAPEUTIC EXERCISES: CPT | Mod: PN

## 2020-12-04 PROCEDURE — 97140 MANUAL THERAPY 1/> REGIONS: CPT | Mod: PN

## 2020-12-04 NOTE — PATIENT INSTRUCTIONS
Step ups to SLS 6-8 inch step 3 x 12  Squats to bench or 18 inch box 4 x 10  Double to single leg calf raises 3 x 10   Side steps with band 3 x 20  Monster walks with band 3 x 20 steps  Machine hamstring curls  3 x 15  Single leg deadlift 3 x 10     Stretching: 3 x 1 min ea  Hamstring stretch  Quad stretch on stomach  Gastroc runners stretch  Heel slides

## 2020-12-04 NOTE — PROGRESS NOTES
Physical Therapy Treatment Note     Name: Solomon Busby Jr.  Clinic Number: [id number]    Therapy Diagnosis:   Encounter Diagnoses   Name Primary?    Post-operative pain     Decreased range of motion (ROM) of right knee Yes    Quadriceps weakness      Physician: Ines Marina MD    Visit Date: 12/4/2020    Physician Orders: PT Eval and Treat   Medical Diagnosis from Referral:   S83.511A (ICD-10-CM) - Rupture of anterior cruciate ligament of right knee, initial encounter   S83.241A (ICD-10-CM) - Acute medial meniscus tear of right knee, initial encounter   Post-op Diagnosis:  Post-Op Diagnosis Codes:     * Complete tear of anterior cruciate ligament of right knee, initial encounter [S83.511A]     * Acute medial meniscus tear of right knee, initial encounter [S83.241A]     * Peripheral tear of lateral meniscus of right knee as current injury, initial encounter [S83.261A]     Evaluation Date: 9/30/2020  Authorization Period Expiration: 9/29/21  Plan of Care Expiration: 12/23/20  Visit #/Visits authorized: 16/20  PN 1/1/21    Time In: 9:08 am  Time Out: 10:18 am  Total Billable Time: 68 minutes    Precautions: Standard and Post surgical    Surgery date: 9/23/2020  10 weeks post op as of 11/30/20      Subjective     Pt reports: continued knee stiffness from cold weather  He was compliant with home exercise program.  Response to previous treatment: no adverse reactions   Functional change: none reported    Pain: 2/10  Location: right knee      Objective       Knee Hyperext/ext/flx AROM:     0/  2  /105 degrees   Knee Hyperext/ext/flx PROM:   0  /  0  /107 degrees         Lower Extremity Strength  Right LE   Left LE     Knee extension: 3/5 Knee extension: 5/5   Knee flexion: 4/5 Knee flexion: 5/5   Hip extension:  4/5 Hip extension: 5/5   Hip abduction: 4/5 Hip abduction: 5/5         Solomon received therapeutic exercises (bold exercises performed) to develop strength, endurance, ROM and flexibility for 47 minutes  including:  Retro walking on treadmill incline 4.0 0.8 mph x 5 min  Upright bike full revolutions x 5 min sci fit level 6   Long sit hamstring stretch with heel prop 2 x 1 min  Prone quad stretch 2 x 1 min  Heel slides 1 x 10   SLR flexion 3 x 10   +Bridge with march 2 x 10   Gastroc stretch on wedge   LAQ 3 x 10 2#  Shuttle SL press 4 x 10 2 black (cue TKE)   +SL hamstring curl 3 x 10 15#  Squats (depth to tolerance) 3 x 10 15#  Side steps with band (ankles) 2 x 20 ft ea GTB  Zig zag steps with band (ankles) 2 x 20 ft ea GTB  Heel prop x 10 min 15#  Prone hang x 10 min 8#      Solomon participated in neuromuscular re-education (bold exercises performed) activities to improve: Coordination and Proprioception for 16 minutes. The following activities were included:  Lateral heel taps 3 x 10 6 inch   Forward step up to SLS 4 inch + airex 3 x 12, 2 sec SLS  TKE with purple band 20 x with foot on gastroc stretch wedge then 20 x foot flat  SL deadlift 3 x 10 (kettebell on floor for target)   +LE slider with R CKC -forward 3 x 10  +backwards lunge with R foot in front 2 x 10       Solomon received the following manual therapy techniques: Joint mobilizations and Soft tissue Mobilization were applied to the: R knee for 05 minutes, including:  Sustained knee extension stretching     Solomon received cold pack for 00 minutes to R knee.      Home Exercises Provided and Patient Education Provided     Education provided:   - Continued use of HEP        Written Home Exercises Provided: Patient instructed to cont prior HEP.  Exercises were reviewed and Solomon was able to demonstrate them prior to the end of the session.  Solomon demonstrated good  understanding of the education provided.     See EMR under Patient Instructions for exercises provided prior visit.    Assessment   The patient is currently 10 weeks post op  R ACL reconstruction with allograft and medial meniscus repair (DOS 9/23/20). He demonstrates -2 degrees AROM knee  extension/105 degrees AROM flexion with decreased TKE throughout midstance phase of gait. Re-assessment performed today and no STG's met at this time/LTG's not assessed secondary to post surgical precautions. He continues to display R quad weakness with extensor lag during SLR flexion with heavy very cueing to perform movement correctly. TKE during gait improves at end of session but poor carry over between visits and patient is inconsistent with attendance, as some weeks he only attends 1x. Poor eccentric control with descent during step ups attempted to 8 inch step so  Step height has been reduced to improve form. He has been able to initiate OKC knee extension with 2# and moderate difficulty achieving full knee extension. Manual and self stretching has been continued to achieve full knee extension and patient educated heavily on HEP. He continues to remain appropriate for skilled PT to address post surgical weakness and decreased ROM to allow for return to PLOF.   Solomon is progressing well towards his goals.   Pt prognosis is Good.     Pt will continue to benefit from skilled outpatient physical therapy to address the deficits listed in the problem list box on initial evaluation, provide pt/family education and to maximize pt's level of independence in the home and community environment.     Pt's spiritual, cultural and educational needs considered and pt agreeable to plan of care and goals.     Anticipated barriers to physical therapy: None     Goals:     Short Term Goals: 12 weeks updated 12/4/20  1. The patient with report compliance with HEP to maximize functional outcomes. met  2. The patient will demonstrate R knee AROM >/= 120 flexion and 0 degrees extension to allow for normalized gait pattern. - progressing   3. The patient will demonstrate lateral heel tap from 8 inch step with good form and control indicating improved quad/hip strength.- progressing   4. The patient will ambulate without AD and  normalized gait pattern to increase ease of walking between classes at school.- progressing   5. The patient will demonstrate >/= 4/5 bilateral hip abduction and extension MMT to prepare patient for return to running with good form. - progressing      Long Term Goals: 20 weeks unable to formally assess due to post surgical precautions   1. The patient will demonstrate understanding and performance of advanced HEP to allow for independence once discharged from therapy.   2. The patient will initiate straight line running program once cleared by PT/MD.   3. The patient will demonstrate >/= 90% symmetry on Y balance test in all directions to allow for safe return to straight line running.-   4. The patient will demonstrate >/= 90 % symmetry on single leg, triple hop, and crossover hop for safe return to agility in football/basketball. -     Plan     Continue with skilled PT per protocol. Focus on gaining full knee extension.     Plan of care Certification: 12/4/20 to 2/26/21     Outpatient Physical Therapy 2 times weekly for 10 weeks to include the following interventions: Electrical Stimulation Russian, IFC, TENS, Gait Training, Manual Therapy, Moist Heat/ Ice, Neuromuscular Re-ed, Patient Education, Therapeutic Activites , Therapeutic Exercise, dry needling.        LEANDER SILVA, PT, DPT   12/4/2020      I CERTIFY THE NEED FOR THESE SERVICES FURNISHED UNDER THIS PLAN OF TREATMENT AND WHILE UNDER MY CARE    Physician's comments:        Physician's Signature: ___________________________________________________

## 2020-12-07 ENCOUNTER — CLINICAL SUPPORT (OUTPATIENT)
Dept: REHABILITATION | Facility: HOSPITAL | Age: 35
End: 2020-12-07
Payer: COMMERCIAL

## 2020-12-07 DIAGNOSIS — G89.18 POST-OPERATIVE PAIN: ICD-10-CM

## 2020-12-07 PROCEDURE — 97110 THERAPEUTIC EXERCISES: CPT | Mod: PN

## 2020-12-07 PROCEDURE — 97112 NEUROMUSCULAR REEDUCATION: CPT | Mod: PN

## 2020-12-07 NOTE — PROGRESS NOTES
Physical Therapy Treatment Note     Name: Solomon Busby Jr.  Clinic Number: [id number]    Therapy Diagnosis:   Encounter Diagnosis   Name Primary?    Post-operative pain      Physician: Ines Marina MD    Visit Date: 12/7/2020    Physician Orders: PT Eval and Treat   Medical Diagnosis from Referral:   S83.511A (ICD-10-CM) - Rupture of anterior cruciate ligament of right knee, initial encounter   S83.241A (ICD-10-CM) - Acute medial meniscus tear of right knee, initial encounter   Post-op Diagnosis:  Post-Op Diagnosis Codes:     * Complete tear of anterior cruciate ligament of right knee, initial encounter [S83.511A]     * Acute medial meniscus tear of right knee, initial encounter [S83.241A]     * Peripheral tear of lateral meniscus of right knee as current injury, initial encounter [S83.261A]     Evaluation Date: 9/30/2020  Authorization Period Expiration: 9/29/21  Plan of Care Expiration: 12/23/20  Visit #/Visits authorized: 5/5 (+12)  PN 1/1/21    Time In: 10:03 am  Time Out: 11:06 am  Total Billable Time: 63 minutes    Precautions: Standard and Post surgical    Surgery date: 9/23/2020  10 weeks post op as of 11/30/20      Subjective     Pt reports:Continues to have stiffness with the cold weather.   He was compliant with home exercise program.  Response to previous treatment: no adverse reactions   Functional change: none reported    Pain: 2/10  Location: right knee      Objective       Knee Hyperext/ext/flx AROM:     0/  2  /105 degrees   Knee Hyperext/ext/flx PROM:   0  /  0  /107 degrees       Solomon received therapeutic exercises (bold exercises performed) to develop strength, endurance, ROM and flexibility for 52 minutes including:  Retro walking on treadmill incline 4.0 0.8 mph x 5 min  Upright bike full revolutions x 5 min sci fit level 6   Long sit hamstring stretch with heel prop 2 x 1 min  Prone quad stretch 2 x 1 min  Heel slides 1 x 10   SLR flexion 3 x 10   Bridge with march 2 x 10   Gastroc  "stretch on wedge 30"x3  LAQ 3 x 10 2#  Shuttle SL press 4 x 10 2 black (cue TKE)   SL hamstring curl 3 x 10 15#  Squats (depth to tolerance) 3 x 10 15#  Side steps with band (ankles) 2 x 20 ft ea GTB  Zig zag steps with band (ankles) 2 x 20 ft ea GTB  Heel prop x 10 min 15#  Prone hang x 10 min 7#    Solomon participated in neuromuscular re-education (bold exercises performed) activities to improve: Coordination and Proprioception for 11 minutes. The following activities were included:  Lateral heel taps 3 x 10 6 inch   Forward step up to SLS 4 inch + airex 3 x 12, 2 sec SLS  TKE with purple band 20 x with foot on gastroc stretch wedge then 20 x foot flat  SL deadlift 3 x 10 (kettebell on floor for target)   LE slider with R CKC -forward 3 x 10  backwards lunge with R foot in front 2 x 10     Solomon received the following manual therapy techniques: Joint mobilizations and Soft tissue Mobilization were applied to the: R knee for 00 minutes, including:  Sustained knee extension stretching     Solomon received cold pack for 00 minutes to R knee.      Home Exercises Provided and Patient Education Provided     Education provided:   - Continued use of HEP     Written Home Exercises Provided: Patient instructed to cont prior HEP.  Exercises were reviewed and Solomon was able to demonstrate them prior to the end of the session.  Solomon demonstrated good  understanding of the education provided.     See EMR under Patient Instructions for exercises provided prior visit.    Assessment     Pt tolerated treatment session fairly well. He presented to clinic with decreased knee extension during gait. He fatigued with all TKE exercises and was most challenged with SL shuttle press and SLR.  He continues to be challenged with balance during DL. 2/2 time constraints manual therapy was not performed today. Pt deferred ice at end of session.   Solomon is progressing well towards his goals.   Pt prognosis is Good.     Pt will continue to " benefit from skilled outpatient physical therapy to address the deficits listed in the problem list box on initial evaluation, provide pt/family education and to maximize pt's level of independence in the home and community environment.     Pt's spiritual, cultural and educational needs considered and pt agreeable to plan of care and goals.     Anticipated barriers to physical therapy: None     Goals:     Short Term Goals: 12 weeks updated 12/4/20  1. The patient with report compliance with HEP to maximize functional outcomes. met  2. The patient will demonstrate R knee AROM >/= 120 flexion and 0 degrees extension to allow for normalized gait pattern. - progressing   3. The patient will demonstrate lateral heel tap from 8 inch step with good form and control indicating improved quad/hip strength.- progressing   4. The patient will ambulate without AD and normalized gait pattern to increase ease of walking between classes at school.- progressing   5. The patient will demonstrate >/= 4/5 bilateral hip abduction and extension MMT to prepare patient for return to running with good form. - progressing      Long Term Goals: 20 weeks unable to formally assess due to post surgical precautions   1. The patient will demonstrate understanding and performance of advanced HEP to allow for independence once discharged from therapy.   2. The patient will initiate straight line running program once cleared by PT/MD.   3. The patient will demonstrate >/= 90% symmetry on Y balance test in all directions to allow for safe return to straight line running.-   4. The patient will demonstrate >/= 90 % symmetry on single leg, triple hop, and crossover hop for safe return to agility in football/basketball. -     Plan     Continue with skilled PT per protocol. Focus on gaining full knee extension.     Plan of care Certification: 12/4/20 to 2/26/21     Outpatient Physical Therapy 2 times weekly for 10 weeks to include the following  interventions: Electrical Stimulation Russian, IFC, TENS, Gait Training, Manual Therapy, Moist Heat/ Ice, Neuromuscular Re-ed, Patient Education, Therapeutic Activites , Therapeutic Exercise, dry needling.        Abby Mcdonald, PT, DPT   12/7/2020

## 2020-12-11 ENCOUNTER — DOCUMENTATION ONLY (OUTPATIENT)
Dept: REHABILITATION | Facility: HOSPITAL | Age: 35
End: 2020-12-11

## 2020-12-11 NOTE — PROGRESS NOTES
Physical Therapy    Patient cancelled appointment scheduled for 12/11/20. Will discuss high frequency of cancellations at next visit.       Tanja Calderon PT, DPT  12/11/2020

## 2020-12-15 ENCOUNTER — DOCUMENTATION ONLY (OUTPATIENT)
Dept: REHABILITATION | Facility: HOSPITAL | Age: 35
End: 2020-12-15

## 2020-12-15 ENCOUNTER — CLINICAL SUPPORT (OUTPATIENT)
Dept: REHABILITATION | Facility: HOSPITAL | Age: 35
End: 2020-12-15
Payer: COMMERCIAL

## 2020-12-15 DIAGNOSIS — M62.81 QUADRICEPS WEAKNESS: Primary | ICD-10-CM

## 2020-12-15 DIAGNOSIS — M25.561 ACUTE PAIN OF RIGHT KNEE: ICD-10-CM

## 2020-12-15 DIAGNOSIS — R68.89 DIFFICULTY PARTICIPATING IN SPORTING ACTIVITIES: ICD-10-CM

## 2020-12-15 DIAGNOSIS — G89.18 POST-OPERATIVE PAIN: ICD-10-CM

## 2020-12-15 DIAGNOSIS — M25.661 DECREASED RANGE OF MOTION (ROM) OF RIGHT KNEE: ICD-10-CM

## 2020-12-15 DIAGNOSIS — M25.469 SWELLING OF KNEE: ICD-10-CM

## 2020-12-15 DIAGNOSIS — R26.2 DIFFICULTY IN WALKING: ICD-10-CM

## 2020-12-15 PROCEDURE — 97110 THERAPEUTIC EXERCISES: CPT | Mod: PN

## 2020-12-15 PROCEDURE — 97112 NEUROMUSCULAR REEDUCATION: CPT | Mod: PN

## 2020-12-15 NOTE — PROGRESS NOTES
Physical Therapy Treatment Note     Name: Solomon Busby Jr.  Clinic Number: [id number]    Therapy Diagnosis:   Encounter Diagnoses   Name Primary?    Post-operative pain     Quadriceps weakness Yes    Acute pain of right knee     Swelling of knee     Decreased range of motion (ROM) of right knee     Difficulty in walking     Difficulty participating in sporting activities      Physician: Ines Marina MD    Visit Date: 12/15/2020    Physician Orders: PT Eval and Treat   Medical Diagnosis from Referral:   S83.511A (ICD-10-CM) - Rupture of anterior cruciate ligament of right knee, initial encounter   S83.241A (ICD-10-CM) - Acute medial meniscus tear of right knee, initial encounter   Post-op Diagnosis:  Post-Op Diagnosis Codes:     * Complete tear of anterior cruciate ligament of right knee, initial encounter [S83.511A]     * Acute medial meniscus tear of right knee, initial encounter [S83.241A]     * Peripheral tear of lateral meniscus of right knee as current injury, initial encounter [S83.261A]     Evaluation Date: 9/30/2020  Authorization Period Expiration: 9/29/21  Plan of Care Expiration: 12/23/20  Visit #/Visits authorized: 2/20 (18 total)  PN 1/1/21    Time In: 12:00 pm  Time Out: 1:00 pm  Total Billable Time: 50 minutes    Precautions: Standard and Post surgical    Surgery date: 9/23/2020  12 weeks post op as of 12/15/20      Subjective     Pt reports: He has missed several therapy visits due to being with his father who is very sick. He has been trying to go to the gym to exercise and ride the bike. Still has a lot of knee stiffness.   He was compliant with home exercise program.  Response to previous treatment: no adverse reactions   Functional change: none reported    Pain: 2/10  Location: right knee      Objective       Knee Hyperext/ext/flx AROM:     0/  5  /110 degrees   Knee Hyperext/ext/flx PROM:   0  /  2  /115 degrees       Solomon received therapeutic exercises (bold exercises  performed) to develop strength, endurance, ROM and flexibility for 40 minutes including:  Retro walking on treadmill incline 4.0 0.6 mph x 5 min  Upright bike full revolutions x 5 min sci fit level 6   Long sit hamstring stretch with heel prop 2 x 1 min  Prone quad stretch 3 x 1 min  SL bridges 2 x 10  Shuttle SL press 4 x 10 2 black (cue TKE)   Goblet squat 3 x 12 with 3 second pause at bottom 25#  Double to single leg knee ext eccentrics on machine 4 x 5 10#   SL hamstring curl 3 x 10 15#  Prone hang x 10 min 7#    Solomon participated in neuromuscular re-education (bold exercises performed) activities to improve: Coordination and Proprioception for 10 minutes. The following activities were included:  Step up to 8 inch step with 3 second eccentric 4x10 holding 15#  SL deadlift 3 x 10 10#     Not performed:  Lateral heel taps 3 x 10 6 inch   Forward step up to SLS 4 inch + airex 3 x 12, 2 sec SLS  TKE with purple band 20 x with foot on gastroc stretch wedge then 20 x foot flat    LE slider with R CKC -forward 3 x 10  backwards lunge with R foot in front 2 x 10     Solomon received the following manual therapy techniques: Joint mobilizations and Soft tissue Mobilization were applied to the: R knee for 00 minutes, including:  Sustained knee extension stretching     Solomon received cold pack for 10 minutes to R knee.      Home Exercises Provided and Patient Education Provided     Education provided:   - Continued use of HEP     Written Home Exercises Provided: Patient instructed to cont prior HEP.  Exercises were reviewed and Solomon was able to demonstrate them prior to the end of the session.  Solomon demonstrated good  understanding of the education provided.     See EMR under Patient Instructions for exercises provided prior visit.    Assessment   The patient entered clinic reporting he has missed several therapy sessions recently secondary to sick family member. He demonstrates improved AROM/PROM knee flexion since  last visit. Decreased knee extension passively today and continues to display decreased TKE throughout midstance so backwards walking on treadmill continued to improve this. Good control noted with eccentric portion of step up with only mild varus/valgus instabilities. Tolerated knee extension eccentrics without pain but muscle fasciculations noted due to weakness. Continues to remain appropriate for skilled PT to normalize gait pattern and improve strength for RTW .   Solomon is progressing well towards his goals.   Pt prognosis is Good.     Pt will continue to benefit from skilled outpatient physical therapy to address the deficits listed in the problem list box on initial evaluation, provide pt/family education and to maximize pt's level of independence in the home and community environment.     Pt's spiritual, cultural and educational needs considered and pt agreeable to plan of care and goals.     Anticipated barriers to physical therapy: None     Goals:     Short Term Goals: 12 weeks updated 12/4/20  1. The patient with report compliance with HEP to maximize functional outcomes. met  2. The patient will demonstrate R knee AROM >/= 120 flexion and 0 degrees extension to allow for normalized gait pattern. - progressing   3. The patient will demonstrate lateral heel tap from 8 inch step with good form and control indicating improved quad/hip strength.- progressing   4. The patient will ambulate without AD and normalized gait pattern to increase ease of walking between classes at school.- progressing   5. The patient will demonstrate >/= 4/5 bilateral hip abduction and extension MMT to prepare patient for return to running with good form. - progressing      Long Term Goals: 20 weeks unable to formally assess due to post surgical precautions   1. The patient will demonstrate understanding and performance of advanced HEP to allow for independence once discharged from therapy.   2. The patient will initiate straight  line running program once cleared by PT/MD.   3. The patient will demonstrate >/= 90% symmetry on Y balance test in all directions to allow for safe return to straight line running.-   4. The patient will demonstrate >/= 90 % symmetry on single leg, triple hop, and crossover hop for safe return to agility in football/basketball. -     Plan     Continue with skilled PT per protocol. Focus on gaining full knee extension.        LEANDER SILVA, PT, DPT   12/15/2020

## 2020-12-15 NOTE — PROGRESS NOTES
Physical Therapy    Pt cancelled his appointment scheduled for 10:30 am on 12/15/20. PT called patient to discuss high cancellation frequency lately. No answer but left voicemail.    Tanja Calderon, PT, DPT

## 2020-12-17 ENCOUNTER — DOCUMENTATION ONLY (OUTPATIENT)
Dept: REHABILITATION | Facility: HOSPITAL | Age: 35
End: 2020-12-17

## 2020-12-17 NOTE — PROGRESS NOTES
Physical Therapy    The patient NS 12/16/20 and 12/17/20. He reported at last visit that he has been in [place] caring for his father who is ill. Will discuss high frequency of NS/cancels with MD.       Tanja Calderon, PT, DPT

## 2020-12-21 ENCOUNTER — OFFICE VISIT (OUTPATIENT)
Dept: ORTHOPEDICS | Facility: CLINIC | Age: 35
End: 2020-12-21
Payer: COMMERCIAL

## 2020-12-21 ENCOUNTER — PATIENT MESSAGE (OUTPATIENT)
Dept: ORTHOPEDICS | Facility: CLINIC | Age: 35
End: 2020-12-21

## 2020-12-21 VITALS
RESPIRATION RATE: 18 BRPM | DIASTOLIC BLOOD PRESSURE: 80 MMHG | SYSTOLIC BLOOD PRESSURE: 138 MMHG | HEART RATE: 93 BPM | OXYGEN SATURATION: 97 % | BODY MASS INDEX: 32.21 KG/M2 | HEIGHT: 67 IN | WEIGHT: 205.25 LBS

## 2020-12-21 DIAGNOSIS — S83.241D ACUTE MEDIAL MENISCUS TEAR OF RIGHT KNEE, SUBSEQUENT ENCOUNTER: Primary | ICD-10-CM

## 2020-12-21 DIAGNOSIS — S83.511A RUPTURE OF ANTERIOR CRUCIATE LIGAMENT OF RIGHT KNEE, INITIAL ENCOUNTER: ICD-10-CM

## 2020-12-21 PROCEDURE — 99024 PR POST-OP FOLLOW-UP VISIT: ICD-10-PCS | Mod: S$GLB,,, | Performed by: ORTHOPAEDIC SURGERY

## 2020-12-21 PROCEDURE — 99999 PR PBB SHADOW E&M-EST. PATIENT-LVL III: ICD-10-PCS | Mod: PBBFAC,,, | Performed by: ORTHOPAEDIC SURGERY

## 2020-12-21 PROCEDURE — 3008F PR BODY MASS INDEX (BMI) DOCUMENTED: ICD-10-PCS | Mod: CPTII,S$GLB,, | Performed by: ORTHOPAEDIC SURGERY

## 2020-12-21 PROCEDURE — 3008F BODY MASS INDEX DOCD: CPT | Mod: CPTII,S$GLB,, | Performed by: ORTHOPAEDIC SURGERY

## 2020-12-21 PROCEDURE — 99999 PR PBB SHADOW E&M-EST. PATIENT-LVL III: CPT | Mod: PBBFAC,,, | Performed by: ORTHOPAEDIC SURGERY

## 2020-12-21 PROCEDURE — 1125F AMNT PAIN NOTED PAIN PRSNT: CPT | Mod: S$GLB,,, | Performed by: ORTHOPAEDIC SURGERY

## 2020-12-21 PROCEDURE — 1125F PR PAIN SEVERITY QUANTIFIED, PAIN PRESENT: ICD-10-PCS | Mod: S$GLB,,, | Performed by: ORTHOPAEDIC SURGERY

## 2020-12-21 PROCEDURE — 99024 POSTOP FOLLOW-UP VISIT: CPT | Mod: S$GLB,,, | Performed by: ORTHOPAEDIC SURGERY

## 2020-12-21 NOTE — PROGRESS NOTES
"Postoperative Visit     History of Present Illness:   Solomon is 12 weeks s/p right ACL reconstruction and Medial meniscus repair  (DOS-09/23/2020)  Says his pain is "horrible" recently - feels that his knee is getting is worse  Does not feel that motion is worsening   Still limping   Localizes pain to anterior knee, parapatellar  He has cancelled multiple PT visits -     Physical Examination:    Vitals:    12/21/20 1413   BP: 138/80   Pulse: 93   Resp: 18   SpO2: 97%   Weight: 93.1 kg (205 lb 4 oz)   Height: 5' 7" (1.702 m)   PainSc: 10-Worst pain ever   PainLoc: Knee      NAD  right lower extremity:  Incisions are well healed  Very limited patellar mobility   Nontender over the medial and lateral joint lines  Active/paaive range of motion: 10 - 110  No effusion, mild soft tissue swelling   Stable Lachmann/anterior drawer        Imaging: No new      Assessment/Plan:  35 y.o. male 12 weeks s/p right ACL reconstruction and Medial meniscus repair  (DOS-09/23/2020)     Plan  1. ROM as tolerated  2. WBAT  3. Continue PT/ACL post op plan - emphasized importance of regular, consistent attendance with PT.  Flexion continues to improve albeit slowly. I am particularly concerned with worsening of his extension which will be more functionally limiting than loss of flexion. Can consider lysis of adhesions with AVTAR if no improvement.   4. Not yet able to return to work due to pain, limited motion and weakness of the RLE. Not cleared to squat, climb, lift, push or pull  5. RTC 4 weeks     All questions were answered in detail. The patient is in full agreement with the treatment plan and will proceed accordingly.    "

## 2020-12-22 ENCOUNTER — CLINICAL SUPPORT (OUTPATIENT)
Dept: REHABILITATION | Facility: HOSPITAL | Age: 35
End: 2020-12-22
Payer: COMMERCIAL

## 2020-12-22 DIAGNOSIS — G89.18 POST-OPERATIVE PAIN: ICD-10-CM

## 2020-12-22 PROCEDURE — 97110 THERAPEUTIC EXERCISES: CPT | Mod: PN

## 2020-12-22 PROCEDURE — 97140 MANUAL THERAPY 1/> REGIONS: CPT | Mod: PN

## 2020-12-22 NOTE — PROGRESS NOTES
Physical Therapy Treatment Note     Name: Solomon Busby Jr.  Clinic Number: [id number]    Therapy Diagnosis:   Encounter Diagnosis   Name Primary?    Post-operative pain      Physician: Ines Marina MD    Visit Date: 12/22/2020    Physician Orders: PT Eval and Treat   Medical Diagnosis from Referral:   S83.511A (ICD-10-CM) - Rupture of anterior cruciate ligament of right knee, initial encounter   S83.241A (ICD-10-CM) - Acute medial meniscus tear of right knee, initial encounter   Post-op Diagnosis:  Post-Op Diagnosis Codes:     * Complete tear of anterior cruciate ligament of right knee, initial encounter [S83.511A]     * Acute medial meniscus tear of right knee, initial encounter [S83.241A]     * Peripheral tear of lateral meniscus of right knee as current injury, initial encounter [S83.261A]     Evaluation Date: 9/30/2020  Authorization Period Expiration: 9/29/21  Plan of Care Expiration: 2/26/21  Visit #/Visits authorized: 2/20 (18 total)  PN 1/1/21    Time In:  6:00 pm  Time Out: 6:58 pm  Total Billable Time: 50 minutes    Precautions: Standard and Post surgical    Surgery date: 9/23/2020  13 weeks post op as of 12/22/20      Subjective     Pt reports: He has missed several therapy visits due to being with his father who is very sick. He has been trying to go to the gym to exercise and ride the bike. Still has a lot of knee stiffness.   He was compliant with home exercise program.  Response to previous treatment: no adverse reactions   Functional change: none reported    Pain: 2/10  Location: right knee      Objective   Heel height difference in prone: 10.3 cm  Knee extension AROM - 5 degrees     Solomon received therapeutic exercises (bold exercises performed) to develop strength, endurance, ROM and flexibility for 20 minutes including:  Long sit hamstring stretch with heel prop and self overpressure gastroc stretch 3 x 1 min  Quad set with black 1/2 foam under knee 30 x 5 sec  SLR flexion 3 x 10  Prone  knee extension stretch x 5 min 10# with MH to posterior knee   Upright bike x 6 min      Solomon participated in neuromuscular re-education (bold exercises performed) activities to improve: Coordination and Proprioception for 00 minutes. The following activities were included:      Solomon received the following manual therapy techniques: Joint mobilizations and Soft tissue Mobilization were applied to the: R knee for 30 minutes, including:  Patella mobs G III all directions  Tibiofemoral A-P and P-A mobs G II-III in supine and sitting edge of mat  Passive knee flexion seated edge of mat  Sustained knee extension stretching in supine  IASTM R quad and TRP release/STM popliteus  talocrual A-P joint mobs with passive DF stretch      Solomon received MH for 8 minutes to R knee.      Home Exercises Provided and Patient Education Provided     Education provided:   - Continued use of HEP     Written Home Exercises Provided: Patient instructed to cont prior HEP.  Exercises were reviewed and Solomon was able to demonstrate them prior to the end of the session.  Solomon demonstrated good  understanding of the education provided.     See EMR under Patient Instructions for exercises provided prior visit.    Assessment   The patient presents to clinic with significant decreased in AROM knee extension and worsening of TKE during gait with early heel rise on surgical limb. Session focused on manual therapy interventions to improve knee extension ROM and he improved from -20 degrees to -5 degrees. MH applied during prone knee extension stretch and at end of visit to reduce complaints of knee stiffness. Worsening of ROM attributed to several missed visits (cancels and no shows) due to family issues over the last several weeks. The patient reported he will be able to be more compliant with his treatment sessions. Benefits from continued skilled PT to address ongoing difficulties with pain, ROM, and strength to allow for return to PLOF.      Solomon is progressing well towards his goals.   Pt prognosis is Good.     Pt will continue to benefit from skilled outpatient physical therapy to address the deficits listed in the problem list box on initial evaluation, provide pt/family education and to maximize pt's level of independence in the home and community environment.     Pt's spiritual, cultural and educational needs considered and pt agreeable to plan of care and goals.     Anticipated barriers to physical therapy: None     Goals:     Short Term Goals: 12 weeks updated 12/4/20  1. The patient with report compliance with HEP to maximize functional outcomes. met  2. The patient will demonstrate R knee AROM >/= 120 flexion and 0 degrees extension to allow for normalized gait pattern. - progressing   3. The patient will demonstrate lateral heel tap from 8 inch step with good form and control indicating improved quad/hip strength.- progressing   4. The patient will ambulate without AD and normalized gait pattern to increase ease of walking between classes at school.- progressing   5. The patient will demonstrate >/= 4/5 bilateral hip abduction and extension MMT to prepare patient for return to running with good form. - progressing      Long Term Goals: 20 weeks unable to formally assess due to post surgical precautions   1. The patient will demonstrate understanding and performance of advanced HEP to allow for independence once discharged from therapy.   2. The patient will initiate straight line running program once cleared by PT/MD.   3. The patient will demonstrate >/= 90% symmetry on Y balance test in all directions to allow for safe return to straight line running.-   4. The patient will demonstrate >/= 90 % symmetry on single leg, triple hop, and crossover hop for safe return to agility in football/basketball. -     Plan     Continue with skilled PT per protocol. Focus on gaining full knee extension.        LEANDER SILVA, PT, DPT    12/22/2020

## 2020-12-24 ENCOUNTER — CLINICAL SUPPORT (OUTPATIENT)
Dept: REHABILITATION | Facility: HOSPITAL | Age: 35
End: 2020-12-24
Payer: COMMERCIAL

## 2020-12-24 DIAGNOSIS — G89.18 POST-OPERATIVE PAIN: ICD-10-CM

## 2020-12-24 PROCEDURE — 97112 NEUROMUSCULAR REEDUCATION: CPT | Mod: PN

## 2020-12-24 PROCEDURE — 97140 MANUAL THERAPY 1/> REGIONS: CPT | Mod: PN

## 2020-12-24 PROCEDURE — 97110 THERAPEUTIC EXERCISES: CPT | Mod: PN

## 2020-12-24 NOTE — PROGRESS NOTES
Physical Therapy Treatment Note     Name: Solomon Busby Jr.  Clinic Number: [id number]    Therapy Diagnosis:   Encounter Diagnosis   Name Primary?    Post-operative pain      Physician: Ines Marina MD    Visit Date: 12/24/2020    Physician Orders: PT Eval and Treat   Medical Diagnosis from Referral:   S83.511A (ICD-10-CM) - Rupture of anterior cruciate ligament of right knee, initial encounter   S83.241A (ICD-10-CM) - Acute medial meniscus tear of right knee, initial encounter   Post-op Diagnosis:  Post-Op Diagnosis Codes:     * Complete tear of anterior cruciate ligament of right knee, initial encounter [S83.511A]     * Acute medial meniscus tear of right knee, initial encounter [S83.241A]     * Peripheral tear of lateral meniscus of right knee as current injury, initial encounter [S83.261A]     Evaluation Date: 9/30/2020  Authorization Period Expiration: 9/29/21  Plan of Care Expiration: 2/26/21  Visit #/Visits authorized: 3/20 (19 total)  PN 1/1/21    Time In:  8:15 am   Time Out: 9:34 am   Total Billable Time: 59 minutes    Precautions: Standard and Post surgical    Surgery date: 9/23/2020  13 weeks post op as of 12/22/20      Subjective     Pt reports: his knee felt a lot better after MT and heat last visit.   He was compliant with home exercise program.  Response to previous treatment: no adverse reactions   Functional change: none reported    Pain: 2/10  Location: right knee      Objective   Heel height difference in prone: 7 cm  Knee extension AROM 0 degrees / PROM +2 degrees  Knee flexion AROM 106 degrees/ PROM 110 degrees     Solomon received therapeutic exercises (bold exercises performed) to develop strength, endurance, ROM and flexibility for 29 minutes including:  Prone knee extension stretch x 7 min 10# with MH to posterior knee   Heel slides 20 x 10 sec with moderate overpressure  Reverse treadmill walking x 5 min 2% incline 0.8 ph  Upright bike 6 min level 5.0  TKE with purple cook band 5 x  10 R 3 sec hold   Gastroc stretch on wedge 3 x 1 min  Soleus stretch on wedge 3 x 1 min     Solomon participated in neuromuscular re-education (bold exercises performed) activities to improve: Coordination and Proprioception for 10 minutes. The following activities were included:  Quad set with russian 40 mA x 5 min 10 sec on 10 sec off co contract with 4 pads  LAQ with [place]an 41 mA x 5 min 10 sec on 10 sec off co contract with 4 pads    Solomon received the following manual therapy techniques: Joint mobilizations and Soft tissue Mobilization were applied to the: R knee for 25 minutes, including:  Patella mobs G III all directions  Tibiofemoral A-P and P-A mobs G II-IIV in supine and sitting edge of mat  Passive knee flexion with foam roller assist and moderate overpressure  Sustained knee extension stretching in supine  IASTM R quad and TRP release/STM popliteus      Solomon received MH for 10 minutes to R knee with heel prop.      Home Exercises Provided and Patient Education Provided     Education provided:   - Continued use of HEP     Written Home Exercises Provided: Patient instructed to cont prior HEP.  Exercises were reviewed and Solomon was able to demonstrate them prior to the end of the session.  Solomon demonstrated good  understanding of the education provided.     See EMR under Patient Instructions for exercises provided prior visit.    Assessment   Session was initiated with MT interventions to continue focusing on improving knee ROM in both directions with most focus on extension to normalize gait pattern. Able to achieve 0 degrees knee extension AROM/+2 extension PROM and 106 deg AROM/110 degrees PROM flexion. Continues to ambulate with decreased knee flexion during swing phase and decreased TKE during midstance but improved from last visit. Some difficulty achieving full knee extension initially during LAQ with [place] estim but improved as time progressed and neuromuscular control improved. Significant  pain and stiffness with passive end range flexion but overall pt reports relief with MT and MH.  Benefits from continued skilled PT to address ongoing difficulties with pain, ROM, and strength to allow for return to PLOF.       Solomon is progressing well towards his goals.   Pt prognosis is Good.     Pt will continue to benefit from skilled outpatient physical therapy to address the deficits listed in the problem list box on initial evaluation, provide pt/family education and to maximize pt's level of independence in the home and community environment.     Pt's spiritual, cultural and educational needs considered and pt agreeable to plan of care and goals.     Anticipated barriers to physical therapy: None     Goals:     Short Term Goals: 12 weeks updated 12/4/20  1. The patient with report compliance with HEP to maximize functional outcomes. met  2. The patient will demonstrate R knee AROM >/= 120 flexion and 0 degrees extension to allow for normalized gait pattern. - progressing   3. The patient will demonstrate lateral heel tap from 8 inch step with good form and control indicating improved quad/hip strength.- progressing   4. The patient will ambulate without AD and normalized gait pattern to increase ease of walking between classes at school.- progressing   5. The patient will demonstrate >/= 4/5 bilateral hip abduction and extension MMT to prepare patient for return to running with good form. - progressing      Long Term Goals: 20 weeks unable to formally assess due to post surgical precautions   1. The patient will demonstrate understanding and performance of advanced HEP to allow for independence once discharged from therapy.   2. The patient will initiate straight line running program once cleared by PT/MD.   3. The patient will demonstrate >/= 90% symmetry on Y balance test in all directions to allow for safe return to straight line running.-   4. The patient will demonstrate >/= 90 % symmetry on single  leg, triple hop, and crossover hop for safe return to agility in football/basketball. -     Plan     Continue with skilled PT per protocol. Focus on gaining full knee extension.        LEANDER SILVA, PT, DPT   12/24/2020

## 2020-12-29 ENCOUNTER — CLINICAL SUPPORT (OUTPATIENT)
Dept: REHABILITATION | Facility: HOSPITAL | Age: 35
End: 2020-12-29
Payer: COMMERCIAL

## 2020-12-29 DIAGNOSIS — G89.18 POST-OPERATIVE PAIN: ICD-10-CM

## 2020-12-29 PROCEDURE — 97140 MANUAL THERAPY 1/> REGIONS: CPT | Mod: PN

## 2020-12-29 PROCEDURE — 97110 THERAPEUTIC EXERCISES: CPT | Mod: PN

## 2020-12-29 PROCEDURE — 97112 NEUROMUSCULAR REEDUCATION: CPT | Mod: PN

## 2020-12-29 NOTE — PROGRESS NOTES
Physical Therapy Treatment Note     Name: Solomon Busby Jr.  Clinic Number: [id number]    Therapy Diagnosis:   Encounter Diagnosis   Name Primary?    Post-operative pain      Physician: Ines Marina MD    Visit Date: 12/29/2020    Physician Orders: PT Eval and Treat   Medical Diagnosis from Referral:   S83.511A (ICD-10-CM) - Rupture of anterior cruciate ligament of right knee, initial encounter   S83.241A (ICD-10-CM) - Acute medial meniscus tear of right knee, initial encounter   Post-op Diagnosis:  Post-Op Diagnosis Codes:     * Complete tear of anterior cruciate ligament of right knee, initial encounter [S83.511A]     * Acute medial meniscus tear of right knee, initial encounter [S83.241A]     * Peripheral tear of lateral meniscus of right knee as current injury, initial encounter [S83.261A]     Evaluation Date: 9/30/2020  Authorization Period Expiration: 9/29/21  Plan of Care Expiration: 2/26/21  Visit #/Visits authorized: 3/20 (19 total)  PN 1/1/21    Time In:  11:15 am   Time Out: 12:22  pm  Total Billable Time: 65 minutes    Precautions: Standard and Post surgical    Surgery date: 9/23/2020  13 weeks post op as of 12/22/20      Subjective     Pt reports: knee is feeling better   He was compliant with home exercise program.  Response to previous treatment: no adverse reactions   Functional change: none reported    Pain: 2/10  Location: right knee      Objective     Knee extension AROM 0 degrees  / PROM +2 degrees- after MT interventions applied   Knee flexion AROM 112 degrees/ PROM 120 degrees     Solomon received therapeutic exercises (bold exercises performed) to develop strength, endurance, ROM and flexibility for  25 minutes including:  Prone knee extension stretch x 7 min 10# with MH to posterior knee   Heel slides 20 x 10 sec, then 10 reps moderate overpressure from PT  Reverse treadmill walking x 5 min 2% incline 0.8 ph  Upright bike 6 min level 5.0  TKE with purple cook band 5 x 10 R 3 sec hold    Gastroc stretch on wedge 3 x 1 min   Prone quad stretch 4 x 1 min  Shuttle SL press 2 black bands 4 x 20 R  Soleus stretch on wedge 3 x 1 min     Solomon participated in neuromuscular re-education (bold exercises performed) activities to improve: Coordination and Proprioception for 30 minutes. The following activities were included:  Quad set with russian 50 mA x 5 min 10 sec on 10 sec off co contract with 4 pads  SLR flexion hold russian 50 mA x 3 min 10 sec on 10 sec off co contract with 4 pads  Forward step up to SLS 3 x 10 4 inch, 2 sec SLS  Lateral heel tap 4 x 10 4 inch  BOSU squats 3 x 10 3 sec hold  SL deadlift on foam 3 x 10     Solomon received the following manual therapy techniques: Joint mobilizations and Soft tissue Mobilization were applied to the: R knee for 10 minutes, including:  Patella mobs G III all directions  Tibiofemoral A-P  mobs G II-IIV in supine   IASTM R quad and TRP release/STM popliteus      Solomon received MH for 10 minutes to R knee with heel prop.      Home Exercises Provided and Patient Education Provided     Education provided:   - Continued use of HEP     Written Home Exercises Provided: Patient instructed to cont prior HEP.  Exercises were reviewed and Solomon was able to demonstrate them prior to the end of the session.  Solomon demonstrated good  understanding of the education provided.     See EMR under Patient Instructions for exercises provided prior visit.    Assessment   The patient was able to demonstrate 120 degrees PROM flexion which is an improvement over previous visit. Continues to present with decreased TKE during midstance phase of gait with decreased knee flexion during swing but less prominent since patient has been more consistent with PT attendance the last two weeks. Daniel continued to improve strength and neuromuscular control of quad set and SLR flexion. He performed SLR flexion holds with difficulty with minimal to no extensor lag which is also an  improvement over the last several visits. Difficulty achieving TKE during step ups to 4 inch step but good control with lateral heel taps from 4 inch. Required occasional UE assistance during SL deadlift on foam to correct LOB but good no LOB during BOSU squats. Benefits from continued skilled PT to address ongoing difficulties with pain, ROM, and strength to allow for return to PLOF.         Solomon is progressing well towards his goals.   Pt prognosis is Good.     Pt will continue to benefit from skilled outpatient physical therapy to address the deficits listed in the problem list box on initial evaluation, provide pt/family education and to maximize pt's level of independence in the home and community environment.     Pt's spiritual, cultural and educational needs considered and pt agreeable to plan of care and goals.     Anticipated barriers to physical therapy: None     Goals:     Short Term Goals: 12 weeks updated 12/4/20  1. The patient with report compliance with HEP to maximize functional outcomes. met  2. The patient will demonstrate R knee AROM >/= 120 flexion and 0 degrees extension to allow for normalized gait pattern. - progressing   3. The patient will demonstrate lateral heel tap from 8 inch step with good form and control indicating improved quad/hip strength.- progressing   4. The patient will ambulate without AD and normalized gait pattern to increase ease of walking between classes at school.- progressing   5. The patient will demonstrate >/= 4/5 bilateral hip abduction and extension MMT to prepare patient for return to running with good form. - progressing      Long Term Goals: 20 weeks unable to formally assess due to post surgical precautions   1. The patient will demonstrate understanding and performance of advanced HEP to allow for independence once discharged from therapy.   2. The patient will initiate straight line running program once cleared by PT/MD.   3. The patient will demonstrate  >/= 90% symmetry on Y balance test in all directions to allow for safe return to straight line running.-   4. The patient will demonstrate >/= 90 % symmetry on single leg, triple hop, and crossover hop for safe return to agility in football/basketball. -     Plan     Continue with skilled PT per protocol. Focus on gaining full knee extension.        LEANDER SILVA, PT, DPT   12/29/2020

## 2020-12-31 ENCOUNTER — CLINICAL SUPPORT (OUTPATIENT)
Dept: REHABILITATION | Facility: HOSPITAL | Age: 35
End: 2020-12-31
Payer: COMMERCIAL

## 2020-12-31 DIAGNOSIS — G89.18 POST-OPERATIVE PAIN: ICD-10-CM

## 2020-12-31 PROCEDURE — 97140 MANUAL THERAPY 1/> REGIONS: CPT | Mod: PN

## 2020-12-31 PROCEDURE — 97112 NEUROMUSCULAR REEDUCATION: CPT | Mod: PN

## 2020-12-31 PROCEDURE — 97110 THERAPEUTIC EXERCISES: CPT | Mod: PN

## 2020-12-31 NOTE — PROGRESS NOTES
Physical Therapy Treatment Note     Name: Solomon Busby Jr.  Clinic Number: [id number]    Therapy Diagnosis:   Encounter Diagnosis   Name Primary?    Post-operative pain      Physician: Ines Marina MD    Visit Date: 12/31/2020    Physician Orders: PT Eval and Treat   Medical Diagnosis from Referral:   S83.511A (ICD-10-CM) - Rupture of anterior cruciate ligament of right knee, initial encounter   S83.241A (ICD-10-CM) - Acute medial meniscus tear of right knee, initial encounter   Post-op Diagnosis:  Post-Op Diagnosis Codes:     * Complete tear of anterior cruciate ligament of right knee, initial encounter [S83.511A]     * Acute medial meniscus tear of right knee, initial encounter [S83.241A]     * Peripheral tear of lateral meniscus of right knee as current injury, initial encounter [S83.261A]     Evaluation Date: 9/30/2020  Authorization Period Expiration: 9/29/21  Plan of Care Expiration: 2/26/21  Visit #/Visits authorized: 5/20 (22 total)  PN 1/1/21    Time In:  12:00 pm  Time Out: 1:18 pm  Total Billable Time: 67 minutes    Precautions: Standard and Post surgical    Surgery date: 9/23/2020  14 weeks post op as of 12/29/20      Subjective     Pt reports: knee feels stiff today.   He was compliant with home exercise program.  Response to previous treatment: no adverse reactions   Functional change: none reported    Pain: 2/10  Location: right knee      Objective   12/31/20- heel height difference in prone 10.3 cm   Knee extension AROM -2 degrees  / PROM 0 degrees- after MT interventions applied measured 12/31/20  Knee flexion AROM 112 degrees/ PROM 120 degrees     Solomon received therapeutic exercises (bold exercises performed) to develop strength, endurance, ROM and flexibility for  29 minutes including:  Upright bike 6 min level 5.0  Prone knee extension stretch x 8 min 10# with MH to posterior knee   +quadruped rock backs with towel in R posterior knee 20 x 10 sec holds  Gastroc stretch on wedge 3 x 30  sec, soleus stretch on wedge 3 x 30 sec  Double to single leg knee extension at matrix 4 x 5 10#  Prone quad stretch 4 x 1 min  Shuttle SL press 2 black bands 4 x 20 R  Heel slides 20 x 10 sec, then 10 reps moderate overpressure from PT  Reverse treadmill walking x 5 min 2% incline 0.8 ph      Solomon participated in neuromuscular re-education (bold exercises performed) activities to improve: Coordination and Proprioception for 20 minutes. The following activities were included:  Quad set with heel prop with russian 41 mA x 5 min 10 sec on 10 sec off co contract with 4 pads  SLR flexion hold russian 41 mA x 5 min 10 sec on 20 sec off co contract with 4 pads  Forward step up to SLS 3 x 12 8 inch, 2 sec SLS  Forward heel tap 3 x 12 6 inch  Lateral heel tap 4 x 10 4 inch  BOSU squats 3 x 10 3 sec hold  SL deadlift on foam 3 x 10     Solomon received the following manual therapy techniques: Joint mobilizations and Soft tissue Mobilization were applied to the: R knee for 10 minutes, including:  Patella mobs G III all directions  Tibiofemoral A-P  mobs G II-IIV in supine   IASTM R quad and TRP release/STM popliteus      Solomon received MH for 10 minutes to R knee with heel prop.      Home Exercises Provided and Patient Education Provided     Education provided:   - Continued use of HEP     Written Home Exercises Provided: Patient instructed to cont prior HEP.  Exercises were reviewed and Solomon was able to demonstrate them prior to the end of the session.  Solomon demonstrated good  understanding of the education provided.     See EMR under Patient Instructions for exercises provided prior visit.    Assessment   The patient entered clinic today with worsening of bent knee gait on R. Demonstrates decreased active and passive knee extension today and worsening of heel height difference in prone. MT and [place]x interventions focused on restoring active and passive knee extension with slight improvement by end of session. Was  able to perform SLR flexion holds without extensor lag with the use of [place] for neuromuscular re-education of quadriceps. Benefits from continued skilled PT to address ongoing difficulties with pain, ROM, and strength to allow for return to PLOF.           Solomon is progressing well towards his goals.   Pt prognosis is Good.     Pt will continue to benefit from skilled outpatient physical therapy to address the deficits listed in the problem list box on initial evaluation, provide pt/family education and to maximize pt's level of independence in the home and community environment.     Pt's spiritual, cultural and educational needs considered and pt agreeable to plan of care and goals.     Anticipated barriers to physical therapy: None     Goals:     Short Term Goals: 12 weeks updated 12/4/20  1. The patient with report compliance with HEP to maximize functional outcomes. met  2. The patient will demonstrate R knee AROM >/= 120 flexion and 0 degrees extension to allow for normalized gait pattern. - progressing   3. The patient will demonstrate lateral heel tap from 8 inch step with good form and control indicating improved quad/hip strength.- progressing   4. The patient will ambulate without AD and normalized gait pattern to increase ease of walking between classes at school.- progressing   5. The patient will demonstrate >/= 4/5 bilateral hip abduction and extension MMT to prepare patient for return to running with good form. - progressing      Long Term Goals: 20 weeks unable to formally assess due to post surgical precautions   1. The patient will demonstrate understanding and performance of advanced HEP to allow for independence once discharged from therapy.   2. The patient will initiate straight line running program once cleared by PT/MD.   3. The patient will demonstrate >/= 90% symmetry on Y balance test in all directions to allow for safe return to straight line running.-   4. The patient will  demonstrate >/= 90 % symmetry on single leg, triple hop, and crossover hop for safe return to agility in football/basketball. -     Plan     Continue with skilled PT per protocol. Focus on gaining full knee extension.        LEANDER SILVA, PT, DPT   12/31/2020

## 2021-01-04 ENCOUNTER — DOCUMENTATION ONLY (OUTPATIENT)
Dept: REHABILITATION | Facility: HOSPITAL | Age: 36
End: 2021-01-04

## 2021-01-04 PROBLEM — G89.18 POST-OPERATIVE PAIN: Status: RESOLVED | Noted: 2020-10-01 | Resolved: 2021-01-04

## 2021-01-07 ENCOUNTER — CLINICAL SUPPORT (OUTPATIENT)
Dept: REHABILITATION | Facility: HOSPITAL | Age: 36
End: 2021-01-07
Attending: ORTHOPAEDIC SURGERY
Payer: COMMERCIAL

## 2021-01-07 DIAGNOSIS — M62.81 QUADRICEPS WEAKNESS: ICD-10-CM

## 2021-01-07 DIAGNOSIS — M25.561 ACUTE PAIN OF RIGHT KNEE: ICD-10-CM

## 2021-01-07 DIAGNOSIS — R26.2 DIFFICULTY IN WALKING: ICD-10-CM

## 2021-01-07 DIAGNOSIS — M25.661 DECREASED RANGE OF MOTION (ROM) OF RIGHT KNEE: Primary | ICD-10-CM

## 2021-01-07 DIAGNOSIS — R68.89 DIFFICULTY PARTICIPATING IN SPORTING ACTIVITIES: ICD-10-CM

## 2021-01-07 PROCEDURE — 97140 MANUAL THERAPY 1/> REGIONS: CPT | Mod: PN

## 2021-01-07 PROCEDURE — 97112 NEUROMUSCULAR REEDUCATION: CPT | Mod: PN

## 2021-01-07 PROCEDURE — 97110 THERAPEUTIC EXERCISES: CPT | Mod: PN

## 2021-01-11 DIAGNOSIS — S83.511A RUPTURE OF ANTERIOR CRUCIATE LIGAMENT OF RIGHT KNEE, INITIAL ENCOUNTER: Primary | ICD-10-CM

## 2021-01-12 ENCOUNTER — CLINICAL SUPPORT (OUTPATIENT)
Dept: REHABILITATION | Facility: HOSPITAL | Age: 36
End: 2021-01-12
Attending: ORTHOPAEDIC SURGERY
Payer: COMMERCIAL

## 2021-01-12 DIAGNOSIS — R26.2 DIFFICULTY IN WALKING: ICD-10-CM

## 2021-01-12 DIAGNOSIS — M25.661 DECREASED RANGE OF MOTION (ROM) OF RIGHT KNEE: Primary | ICD-10-CM

## 2021-01-12 DIAGNOSIS — M25.561 ACUTE PAIN OF RIGHT KNEE: ICD-10-CM

## 2021-01-12 DIAGNOSIS — R68.89 DIFFICULTY PARTICIPATING IN SPORTING ACTIVITIES: ICD-10-CM

## 2021-01-12 DIAGNOSIS — S83.511A COMPLETE TEAR OF RIGHT ACL, INITIAL ENCOUNTER: ICD-10-CM

## 2021-01-12 DIAGNOSIS — M62.81 QUADRICEPS WEAKNESS: ICD-10-CM

## 2021-01-12 PROCEDURE — 97110 THERAPEUTIC EXERCISES: CPT | Mod: PN

## 2021-01-12 PROCEDURE — 97140 MANUAL THERAPY 1/> REGIONS: CPT | Mod: PN

## 2021-01-13 ENCOUNTER — CLINICAL SUPPORT (OUTPATIENT)
Dept: REHABILITATION | Facility: HOSPITAL | Age: 36
End: 2021-01-13
Attending: ORTHOPAEDIC SURGERY
Payer: COMMERCIAL

## 2021-01-13 DIAGNOSIS — R68.89 DIFFICULTY PARTICIPATING IN SPORTING ACTIVITIES: ICD-10-CM

## 2021-01-13 DIAGNOSIS — M25.661 DECREASED RANGE OF MOTION (ROM) OF RIGHT KNEE: ICD-10-CM

## 2021-01-13 DIAGNOSIS — M25.561 ACUTE PAIN OF RIGHT KNEE: ICD-10-CM

## 2021-01-13 DIAGNOSIS — M25.469 SWELLING OF KNEE: ICD-10-CM

## 2021-01-13 DIAGNOSIS — M62.81 QUADRICEPS WEAKNESS: Primary | ICD-10-CM

## 2021-01-13 DIAGNOSIS — R26.2 DIFFICULTY IN WALKING: ICD-10-CM

## 2021-01-13 PROCEDURE — 97140 MANUAL THERAPY 1/> REGIONS: CPT | Mod: PN

## 2021-01-13 PROCEDURE — 97110 THERAPEUTIC EXERCISES: CPT | Mod: PN

## 2021-01-14 ENCOUNTER — OFFICE VISIT (OUTPATIENT)
Dept: ORTHOPEDICS | Facility: CLINIC | Age: 36
End: 2021-01-14
Payer: COMMERCIAL

## 2021-01-14 ENCOUNTER — LAB VISIT (OUTPATIENT)
Dept: LAB | Facility: HOSPITAL | Age: 36
End: 2021-01-14
Attending: ORTHOPAEDIC SURGERY
Payer: COMMERCIAL

## 2021-01-14 VITALS
HEIGHT: 67 IN | RESPIRATION RATE: 18 BRPM | DIASTOLIC BLOOD PRESSURE: 86 MMHG | BODY MASS INDEX: 32.04 KG/M2 | SYSTOLIC BLOOD PRESSURE: 120 MMHG | OXYGEN SATURATION: 97 % | WEIGHT: 204.13 LBS | HEART RATE: 84 BPM

## 2021-01-14 DIAGNOSIS — S83.511A RUPTURE OF ANTERIOR CRUCIATE LIGAMENT OF RIGHT KNEE, INITIAL ENCOUNTER: Primary | ICD-10-CM

## 2021-01-14 DIAGNOSIS — S83.511A RUPTURE OF ANTERIOR CRUCIATE LIGAMENT OF RIGHT KNEE, INITIAL ENCOUNTER: ICD-10-CM

## 2021-01-14 LAB
BASOPHILS # BLD AUTO: 0.11 K/UL (ref 0–0.2)
BASOPHILS NFR BLD: 1.2 % (ref 0–1.9)
CRP SERPL-MCNC: 1 MG/L (ref 0–8.2)
DIFFERENTIAL METHOD: ABNORMAL
EOSINOPHIL # BLD AUTO: 0.2 K/UL (ref 0–0.5)
EOSINOPHIL NFR BLD: 2.5 % (ref 0–8)
ERYTHROCYTE [DISTWIDTH] IN BLOOD BY AUTOMATED COUNT: 13.3 % (ref 11.5–14.5)
ERYTHROCYTE [SEDIMENTATION RATE] IN BLOOD BY WESTERGREN METHOD: 3 MM/HR (ref 0–10)
HCT VFR BLD AUTO: 47.5 % (ref 40–54)
HGB BLD-MCNC: 16.2 G/DL (ref 14–18)
IMM GRANULOCYTES # BLD AUTO: 0.02 K/UL (ref 0–0.04)
IMM GRANULOCYTES NFR BLD AUTO: 0.2 % (ref 0–0.5)
LYMPHOCYTES # BLD AUTO: 4.5 K/UL (ref 1–4.8)
LYMPHOCYTES NFR BLD: 51.1 % (ref 18–48)
MCH RBC QN AUTO: 27.3 PG (ref 27–31)
MCHC RBC AUTO-ENTMCNC: 34.1 G/DL (ref 32–36)
MCV RBC AUTO: 80 FL (ref 82–98)
MONOCYTES # BLD AUTO: 0.7 K/UL (ref 0.3–1)
MONOCYTES NFR BLD: 8.4 % (ref 4–15)
NEUTROPHILS # BLD AUTO: 3.2 K/UL (ref 1.8–7.7)
NEUTROPHILS NFR BLD: 36.6 % (ref 38–73)
NRBC BLD-RTO: 0 /100 WBC
PLATELET # BLD AUTO: 358 K/UL (ref 150–350)
PMV BLD AUTO: 10 FL (ref 9.2–12.9)
RBC # BLD AUTO: 5.93 M/UL (ref 4.6–6.2)
WBC # BLD AUTO: 8.82 K/UL (ref 3.9–12.7)

## 2021-01-14 PROCEDURE — 1125F AMNT PAIN NOTED PAIN PRSNT: CPT | Mod: S$GLB,,, | Performed by: ORTHOPAEDIC SURGERY

## 2021-01-14 PROCEDURE — 85025 COMPLETE CBC W/AUTO DIFF WBC: CPT

## 2021-01-14 PROCEDURE — 1125F PR PAIN SEVERITY QUANTIFIED, PAIN PRESENT: ICD-10-PCS | Mod: S$GLB,,, | Performed by: ORTHOPAEDIC SURGERY

## 2021-01-14 PROCEDURE — 99213 PR OFFICE/OUTPT VISIT, EST, LEVL III, 20-29 MIN: ICD-10-PCS | Mod: S$GLB,,, | Performed by: ORTHOPAEDIC SURGERY

## 2021-01-14 PROCEDURE — 99999 PR PBB SHADOW E&M-EST. PATIENT-LVL IV: CPT | Mod: PBBFAC,,, | Performed by: ORTHOPAEDIC SURGERY

## 2021-01-14 PROCEDURE — 86140 C-REACTIVE PROTEIN: CPT

## 2021-01-14 PROCEDURE — 3008F BODY MASS INDEX DOCD: CPT | Mod: CPTII,S$GLB,, | Performed by: ORTHOPAEDIC SURGERY

## 2021-01-14 PROCEDURE — 3008F PR BODY MASS INDEX (BMI) DOCUMENTED: ICD-10-PCS | Mod: CPTII,S$GLB,, | Performed by: ORTHOPAEDIC SURGERY

## 2021-01-14 PROCEDURE — 36415 COLL VENOUS BLD VENIPUNCTURE: CPT | Mod: PN

## 2021-01-14 PROCEDURE — 99213 OFFICE O/P EST LOW 20 MIN: CPT | Mod: S$GLB,,, | Performed by: ORTHOPAEDIC SURGERY

## 2021-01-14 PROCEDURE — 99999 PR PBB SHADOW E&M-EST. PATIENT-LVL IV: ICD-10-PCS | Mod: PBBFAC,,, | Performed by: ORTHOPAEDIC SURGERY

## 2021-01-14 PROCEDURE — 85652 RBC SED RATE AUTOMATED: CPT

## 2021-01-14 RX ORDER — MELOXICAM 7.5 MG/1
7.5 TABLET ORAL DAILY
Qty: 30 TABLET | Refills: 0 | Status: SHIPPED | OUTPATIENT
Start: 2021-01-14 | End: 2021-02-01

## 2021-01-16 ENCOUNTER — HOSPITAL ENCOUNTER (OUTPATIENT)
Dept: RADIOLOGY | Facility: HOSPITAL | Age: 36
Discharge: HOME | End: 2021-01-16
Attending: ORTHOPAEDIC SURGERY
Payer: COMMERCIAL

## 2021-01-16 DIAGNOSIS — S83.511A RUPTURE OF ANTERIOR CRUCIATE LIGAMENT OF RIGHT KNEE, INITIAL ENCOUNTER: ICD-10-CM

## 2021-01-16 PROCEDURE — 73721 MRI JNT OF LWR EXTRE W/O DYE: CPT | Mod: TC,RT

## 2021-01-16 PROCEDURE — 73721 MRI KNEE WITHOUT CONTRAST RIGHT: ICD-10-PCS | Mod: 26,RT,, | Performed by: RADIOLOGY

## 2021-01-16 PROCEDURE — 73721 MRI JNT OF LWR EXTRE W/O DYE: CPT | Mod: 26,RT,, | Performed by: RADIOLOGY

## 2021-01-20 ENCOUNTER — CLINICAL SUPPORT (OUTPATIENT)
Dept: REHABILITATION | Facility: HOSPITAL | Age: 36
End: 2021-01-20
Attending: ORTHOPAEDIC SURGERY
Payer: COMMERCIAL

## 2021-01-20 DIAGNOSIS — M25.661 DECREASED RANGE OF MOTION (ROM) OF RIGHT KNEE: Primary | ICD-10-CM

## 2021-01-20 DIAGNOSIS — R68.89 DIFFICULTY PARTICIPATING IN SPORTING ACTIVITIES: ICD-10-CM

## 2021-01-20 DIAGNOSIS — R26.2 DIFFICULTY IN WALKING: ICD-10-CM

## 2021-01-20 PROCEDURE — 97110 THERAPEUTIC EXERCISES: CPT | Mod: PN

## 2021-01-21 ENCOUNTER — OFFICE VISIT (OUTPATIENT)
Dept: ORTHOPEDICS | Facility: CLINIC | Age: 36
End: 2021-01-21
Payer: COMMERCIAL

## 2021-01-21 VITALS
HEART RATE: 91 BPM | OXYGEN SATURATION: 96 % | DIASTOLIC BLOOD PRESSURE: 76 MMHG | HEIGHT: 67 IN | BODY MASS INDEX: 31.83 KG/M2 | RESPIRATION RATE: 18 BRPM | SYSTOLIC BLOOD PRESSURE: 122 MMHG | WEIGHT: 202.81 LBS

## 2021-01-21 DIAGNOSIS — S83.511A RUPTURE OF ANTERIOR CRUCIATE LIGAMENT OF RIGHT KNEE, INITIAL ENCOUNTER: ICD-10-CM

## 2021-01-21 DIAGNOSIS — M24.661 ARTHROFIBROSIS OF KNEE JOINT, RIGHT: Primary | ICD-10-CM

## 2021-01-21 PROCEDURE — 99213 OFFICE O/P EST LOW 20 MIN: CPT | Mod: S$GLB,,, | Performed by: ORTHOPAEDIC SURGERY

## 2021-01-21 PROCEDURE — 3008F PR BODY MASS INDEX (BMI) DOCUMENTED: ICD-10-PCS | Mod: CPTII,S$GLB,, | Performed by: ORTHOPAEDIC SURGERY

## 2021-01-21 PROCEDURE — 1125F AMNT PAIN NOTED PAIN PRSNT: CPT | Mod: S$GLB,,, | Performed by: ORTHOPAEDIC SURGERY

## 2021-01-21 PROCEDURE — 99999 PR PBB SHADOW E&M-EST. PATIENT-LVL V: ICD-10-PCS | Mod: PBBFAC,,, | Performed by: ORTHOPAEDIC SURGERY

## 2021-01-21 PROCEDURE — 3008F BODY MASS INDEX DOCD: CPT | Mod: CPTII,S$GLB,, | Performed by: ORTHOPAEDIC SURGERY

## 2021-01-21 PROCEDURE — 99213 PR OFFICE/OUTPT VISIT, EST, LEVL III, 20-29 MIN: ICD-10-PCS | Mod: S$GLB,,, | Performed by: ORTHOPAEDIC SURGERY

## 2021-01-21 PROCEDURE — 1125F PR PAIN SEVERITY QUANTIFIED, PAIN PRESENT: ICD-10-PCS | Mod: S$GLB,,, | Performed by: ORTHOPAEDIC SURGERY

## 2021-01-21 PROCEDURE — 99999 PR PBB SHADOW E&M-EST. PATIENT-LVL V: CPT | Mod: PBBFAC,,, | Performed by: ORTHOPAEDIC SURGERY

## 2021-01-21 RX ORDER — SODIUM CHLORIDE 9 MG/ML
INJECTION, SOLUTION INTRAVENOUS CONTINUOUS
Status: CANCELLED | OUTPATIENT
Start: 2021-01-21

## 2021-01-25 ENCOUNTER — ANESTHESIA EVENT (OUTPATIENT)
Dept: SURGERY | Facility: HOSPITAL | Age: 36
End: 2021-01-25
Payer: COMMERCIAL

## 2021-01-25 ENCOUNTER — HOSPITAL ENCOUNTER (OUTPATIENT)
Dept: PREADMISSION TESTING | Facility: HOSPITAL | Age: 36
Discharge: HOME | End: 2021-01-25
Attending: ORTHOPAEDIC SURGERY
Payer: COMMERCIAL

## 2021-01-25 VITALS
HEART RATE: 88 BPM | SYSTOLIC BLOOD PRESSURE: 133 MMHG | DIASTOLIC BLOOD PRESSURE: 82 MMHG | TEMPERATURE: 99 F | HEIGHT: 67 IN | WEIGHT: 204.81 LBS | OXYGEN SATURATION: 96 % | RESPIRATION RATE: 17 BRPM | BODY MASS INDEX: 32.15 KG/M2

## 2021-01-25 DIAGNOSIS — Z01.818 PREOP TESTING: ICD-10-CM

## 2021-01-25 LAB — SARS-COV-2 RDRP RESP QL NAA+PROBE: NEGATIVE

## 2021-01-25 PROCEDURE — U0002 COVID-19 LAB TEST NON-CDC: HCPCS

## 2021-01-25 RX ORDER — MULTIVITAMIN
1 TABLET ORAL DAILY
COMMUNITY

## 2021-01-27 ENCOUNTER — PATIENT MESSAGE (OUTPATIENT)
Dept: REHABILITATION | Facility: HOSPITAL | Age: 36
End: 2021-01-27

## 2021-01-27 ENCOUNTER — ANESTHESIA (OUTPATIENT)
Dept: SURGERY | Facility: HOSPITAL | Age: 36
End: 2021-01-27
Payer: COMMERCIAL

## 2021-01-27 ENCOUNTER — HOSPITAL ENCOUNTER (OUTPATIENT)
Facility: HOSPITAL | Age: 36
Discharge: HOME | End: 2021-01-27
Attending: ORTHOPAEDIC SURGERY | Admitting: ORTHOPAEDIC SURGERY
Payer: COMMERCIAL

## 2021-01-27 VITALS
SYSTOLIC BLOOD PRESSURE: 132 MMHG | OXYGEN SATURATION: 95 % | HEART RATE: 104 BPM | BODY MASS INDEX: 32.08 KG/M2 | RESPIRATION RATE: 16 BRPM | DIASTOLIC BLOOD PRESSURE: 74 MMHG | TEMPERATURE: 99 F | WEIGHT: 204.81 LBS

## 2021-01-27 DIAGNOSIS — M24.661 ARTHROFIBROSIS OF KNEE JOINT, RIGHT: ICD-10-CM

## 2021-01-27 DIAGNOSIS — Z01.818 PREOP TESTING: Primary | ICD-10-CM

## 2021-01-27 DIAGNOSIS — S83.511A COMPLETE TEAR OF RIGHT ACL, INITIAL ENCOUNTER: ICD-10-CM

## 2021-01-27 PROCEDURE — 64447 NJX AA&/STRD FEMORAL NRV IMG: CPT | Performed by: ANESTHESIOLOGY

## 2021-01-27 PROCEDURE — 64447 PR NERVE BLOCK INJ, ANES/STEROID, FEMORAL, INCL IMAG GUIDANCE: ICD-10-PCS | Mod: 59,RT,, | Performed by: ANESTHESIOLOGY

## 2021-01-27 PROCEDURE — 63600175 PHARM REV CODE 636 W HCPCS: Performed by: ORTHOPAEDIC SURGERY

## 2021-01-27 PROCEDURE — 29884 ARTHRS KNEE SURG LYSIS ADS: CPT | Mod: RT,,, | Performed by: ORTHOPAEDIC SURGERY

## 2021-01-27 PROCEDURE — 71000033 HC RECOVERY, INTIAL HOUR: Performed by: ORTHOPAEDIC SURGERY

## 2021-01-27 PROCEDURE — D9220A PRA ANESTHESIA: Mod: ,,, | Performed by: NURSE ANESTHETIST, CERTIFIED REGISTERED

## 2021-01-27 PROCEDURE — 63600175 PHARM REV CODE 636 W HCPCS: Performed by: ANESTHESIOLOGY

## 2021-01-27 PROCEDURE — 37000008 HC ANESTHESIA 1ST 15 MINUTES: Performed by: ORTHOPAEDIC SURGERY

## 2021-01-27 PROCEDURE — 36000711: Performed by: ORTHOPAEDIC SURGERY

## 2021-01-27 PROCEDURE — 29884 PR KNEE SCOPE,LYSIS OF ADHESNS: ICD-10-PCS | Mod: RT,,, | Performed by: ORTHOPAEDIC SURGERY

## 2021-01-27 PROCEDURE — D9220A PRA ANESTHESIA: Mod: ,,, | Performed by: ANESTHESIOLOGY

## 2021-01-27 PROCEDURE — D9220A PRA ANESTHESIA: ICD-10-PCS | Mod: ,,, | Performed by: NURSE ANESTHETIST, CERTIFIED REGISTERED

## 2021-01-27 PROCEDURE — 76942 PR U/S GUIDANCE FOR NEEDLE GUIDANCE: ICD-10-PCS | Mod: 26,,, | Performed by: ANESTHESIOLOGY

## 2021-01-27 PROCEDURE — 71000016 HC POSTOP RECOV ADDL HR: Performed by: ORTHOPAEDIC SURGERY

## 2021-01-27 PROCEDURE — 63600175 PHARM REV CODE 636 W HCPCS: Performed by: NURSE ANESTHETIST, CERTIFIED REGISTERED

## 2021-01-27 PROCEDURE — 76942 ECHO GUIDE FOR BIOPSY: CPT | Performed by: ANESTHESIOLOGY

## 2021-01-27 PROCEDURE — D9220A PRA ANESTHESIA: ICD-10-PCS | Mod: ,,, | Performed by: ANESTHESIOLOGY

## 2021-01-27 PROCEDURE — 25000003 PHARM REV CODE 250: Performed by: NURSE ANESTHETIST, CERTIFIED REGISTERED

## 2021-01-27 PROCEDURE — 36000710: Performed by: ORTHOPAEDIC SURGERY

## 2021-01-27 PROCEDURE — 25000003 PHARM REV CODE 250: Performed by: ANESTHESIOLOGY

## 2021-01-27 PROCEDURE — 27201423 OPTIME MED/SURG SUP & DEVICES STERILE SUPPLY: Performed by: ORTHOPAEDIC SURGERY

## 2021-01-27 PROCEDURE — 71000039 HC RECOVERY, EACH ADD'L HOUR: Performed by: ORTHOPAEDIC SURGERY

## 2021-01-27 PROCEDURE — 76942 ECHO GUIDE FOR BIOPSY: CPT | Mod: 26,,, | Performed by: ANESTHESIOLOGY

## 2021-01-27 PROCEDURE — 64447 NJX AA&/STRD FEMORAL NRV IMG: CPT | Mod: 59,RT,, | Performed by: ANESTHESIOLOGY

## 2021-01-27 PROCEDURE — 71000015 HC POSTOP RECOV 1ST HR: Performed by: ORTHOPAEDIC SURGERY

## 2021-01-27 PROCEDURE — 37000009 HC ANESTHESIA EA ADD 15 MINS: Performed by: ORTHOPAEDIC SURGERY

## 2021-01-27 RX ORDER — ACETAMINOPHEN 500 MG
1000 TABLET ORAL
Status: COMPLETED | OUTPATIENT
Start: 2021-01-27 | End: 2021-01-27

## 2021-01-27 RX ORDER — HYDRALAZINE HYDROCHLORIDE 20 MG/ML
5 INJECTION INTRAMUSCULAR; INTRAVENOUS ONCE
Status: COMPLETED | OUTPATIENT
Start: 2021-01-27 | End: 2021-01-27

## 2021-01-27 RX ORDER — LIDOCAINE HYDROCHLORIDE 20 MG/ML
INJECTION INTRAVENOUS
Status: DISCONTINUED | OUTPATIENT
Start: 2021-01-27 | End: 2021-01-27

## 2021-01-27 RX ORDER — SODIUM CHLORIDE 9 MG/ML
INJECTION, SOLUTION INTRAVENOUS CONTINUOUS
Status: DISCONTINUED | OUTPATIENT
Start: 2021-01-27 | End: 2021-01-27 | Stop reason: HOSPADM

## 2021-01-27 RX ORDER — ROPIVACAINE HYDROCHLORIDE 5 MG/ML
INJECTION, SOLUTION EPIDURAL; INFILTRATION; PERINEURAL
Status: DISCONTINUED | OUTPATIENT
Start: 2021-01-27 | End: 2021-01-27

## 2021-01-27 RX ORDER — FENTANYL CITRATE 50 UG/ML
INJECTION, SOLUTION INTRAMUSCULAR; INTRAVENOUS
Status: DISCONTINUED | OUTPATIENT
Start: 2021-01-27 | End: 2021-01-27

## 2021-01-27 RX ORDER — ONDANSETRON 2 MG/ML
INJECTION INTRAMUSCULAR; INTRAVENOUS
Status: DISCONTINUED | OUTPATIENT
Start: 2021-01-27 | End: 2021-01-27

## 2021-01-27 RX ORDER — HYDROMORPHONE HYDROCHLORIDE 2 MG/ML
0.2 INJECTION, SOLUTION INTRAMUSCULAR; INTRAVENOUS; SUBCUTANEOUS EVERY 5 MIN PRN
Status: DISCONTINUED | OUTPATIENT
Start: 2021-01-27 | End: 2021-01-27 | Stop reason: HOSPADM

## 2021-01-27 RX ORDER — MIDAZOLAM HYDROCHLORIDE 1 MG/ML
INJECTION, SOLUTION INTRAMUSCULAR; INTRAVENOUS
Status: DISCONTINUED | OUTPATIENT
Start: 2021-01-27 | End: 2021-01-27

## 2021-01-27 RX ORDER — HYDRALAZINE HYDROCHLORIDE 20 MG/ML
INJECTION INTRAMUSCULAR; INTRAVENOUS
Status: DISCONTINUED
Start: 2021-01-27 | End: 2021-01-27 | Stop reason: HOSPADM

## 2021-01-27 RX ORDER — LIDOCAINE HYDROCHLORIDE 10 MG/ML
1 INJECTION, SOLUTION EPIDURAL; INFILTRATION; INTRACAUDAL; PERINEURAL ONCE
Status: DISCONTINUED | OUTPATIENT
Start: 2021-01-27 | End: 2021-01-27 | Stop reason: HOSPADM

## 2021-01-27 RX ORDER — CEFAZOLIN SODIUM 2 G/50ML
2 SOLUTION INTRAVENOUS
Status: DISCONTINUED | OUTPATIENT
Start: 2021-01-27 | End: 2021-01-27 | Stop reason: HOSPADM

## 2021-01-27 RX ORDER — EPINEPHRINE 1 MG/ML
INJECTION, SOLUTION INTRACARDIAC; INTRAMUSCULAR; INTRAVENOUS; SUBCUTANEOUS
Status: DISCONTINUED | OUTPATIENT
Start: 2021-01-27 | End: 2021-01-27 | Stop reason: HOSPADM

## 2021-01-27 RX ORDER — ONDANSETRON 8 MG/1
8 TABLET, ORALLY DISINTEGRATING ORAL 2 TIMES DAILY
Qty: 10 TABLET | Refills: 0 | Status: SHIPPED | OUTPATIENT
Start: 2021-01-27 | End: 2021-03-15

## 2021-01-27 RX ORDER — SODIUM CHLORIDE 0.9 % (FLUSH) 0.9 %
10 SYRINGE (ML) INJECTION
Status: DISCONTINUED | OUTPATIENT
Start: 2021-01-27 | End: 2021-01-27 | Stop reason: HOSPADM

## 2021-01-27 RX ORDER — MELOXICAM 15 MG/1
15 TABLET ORAL DAILY
Qty: 30 TABLET | Refills: 0 | Status: SHIPPED | OUTPATIENT
Start: 2021-01-27 | End: 2021-02-01 | Stop reason: SDUPTHER

## 2021-01-27 RX ORDER — OXYCODONE AND ACETAMINOPHEN 10; 325 MG/1; MG/1
1 TABLET ORAL EVERY 4 HOURS PRN
Qty: 30 TABLET | Refills: 0 | Status: SHIPPED | OUTPATIENT
Start: 2021-01-27 | End: 2021-03-15

## 2021-01-27 RX ORDER — PROPOFOL 10 MG/ML
VIAL (ML) INTRAVENOUS
Status: DISCONTINUED | OUTPATIENT
Start: 2021-01-27 | End: 2021-01-27

## 2021-01-27 RX ADMIN — PROPOFOL 150 MG: 10 INJECTION, EMULSION INTRAVENOUS at 07:01

## 2021-01-27 RX ADMIN — MIDAZOLAM HYDROCHLORIDE 2 MG: 1 INJECTION, SOLUTION INTRAMUSCULAR; INTRAVENOUS at 07:01

## 2021-01-27 RX ADMIN — ROPIVACAINE HYDROCHLORIDE 25 ML: 5 INJECTION, SOLUTION EPIDURAL; INFILTRATION; PERINEURAL at 06:01

## 2021-01-27 RX ADMIN — PROPOFOL 30 MG: 10 INJECTION, EMULSION INTRAVENOUS at 07:01

## 2021-01-27 RX ADMIN — PROPOFOL 30 MG: 10 INJECTION, EMULSION INTRAVENOUS at 08:01

## 2021-01-27 RX ADMIN — PROPOFOL 20 MG: 10 INJECTION, EMULSION INTRAVENOUS at 07:01

## 2021-01-27 RX ADMIN — CEFAZOLIN SODIUM 3 G: 1 POWDER, FOR SOLUTION INTRAMUSCULAR; INTRAVENOUS at 07:01

## 2021-01-27 RX ADMIN — ACETAMINOPHEN 1000 MG: 500 TABLET ORAL at 06:01

## 2021-01-27 RX ADMIN — FENTANYL CITRATE 100 MCG: 50 INJECTION, SOLUTION INTRAMUSCULAR; INTRAVENOUS at 07:01

## 2021-01-27 RX ADMIN — HYDRALAZINE HYDROCHLORIDE 5 MG: 20 INJECTION INTRAMUSCULAR; INTRAVENOUS at 10:01

## 2021-01-27 RX ADMIN — Medication 100 MG: at 07:01

## 2021-01-27 RX ADMIN — ONDANSETRON 4 MG: 2 INJECTION, SOLUTION INTRAMUSCULAR; INTRAVENOUS at 07:01

## 2021-01-27 RX ADMIN — PROPOFOL 20 MG: 10 INJECTION, EMULSION INTRAVENOUS at 08:01

## 2021-01-28 ENCOUNTER — PATIENT MESSAGE (OUTPATIENT)
Dept: FAMILY MEDICINE | Facility: CLINIC | Age: 36
End: 2021-01-28

## 2021-01-28 ENCOUNTER — CLINICAL SUPPORT (OUTPATIENT)
Dept: REHABILITATION | Facility: HOSPITAL | Age: 36
End: 2021-01-28
Attending: ORTHOPAEDIC SURGERY
Payer: COMMERCIAL

## 2021-01-28 DIAGNOSIS — M25.561 ACUTE PAIN OF RIGHT KNEE: ICD-10-CM

## 2021-01-28 DIAGNOSIS — R26.2 DIFFICULTY IN WALKING: ICD-10-CM

## 2021-01-28 DIAGNOSIS — M25.661 DECREASED RANGE OF MOTION (ROM) OF RIGHT KNEE: ICD-10-CM

## 2021-01-28 DIAGNOSIS — M62.81 QUADRICEPS WEAKNESS: ICD-10-CM

## 2021-01-28 DIAGNOSIS — R68.89 DIFFICULTY PARTICIPATING IN SPORTING ACTIVITIES: Primary | ICD-10-CM

## 2021-01-28 DIAGNOSIS — M25.469 SWELLING OF KNEE: ICD-10-CM

## 2021-01-28 DIAGNOSIS — J98.01 BRONCHOSPASM: ICD-10-CM

## 2021-01-28 PROCEDURE — 97140 MANUAL THERAPY 1/> REGIONS: CPT | Mod: PN

## 2021-01-28 PROCEDURE — 97110 THERAPEUTIC EXERCISES: CPT | Mod: PN

## 2021-01-28 RX ORDER — IPRATROPIUM BROMIDE AND ALBUTEROL SULFATE 2.5; .5 MG/3ML; MG/3ML
3 SOLUTION RESPIRATORY (INHALATION) EVERY 6 HOURS PRN
Qty: 100 VIAL | Refills: 1 | Status: SHIPPED | OUTPATIENT
Start: 2021-01-28 | End: 2021-01-29 | Stop reason: SDUPTHER

## 2021-01-29 ENCOUNTER — CLINICAL SUPPORT (OUTPATIENT)
Dept: REHABILITATION | Facility: HOSPITAL | Age: 36
End: 2021-01-29
Attending: ORTHOPAEDIC SURGERY
Payer: COMMERCIAL

## 2021-01-29 DIAGNOSIS — M25.469 SWELLING OF KNEE: ICD-10-CM

## 2021-01-29 DIAGNOSIS — M25.561 ACUTE PAIN OF RIGHT KNEE: ICD-10-CM

## 2021-01-29 DIAGNOSIS — R68.89 DIFFICULTY PARTICIPATING IN SPORTING ACTIVITIES: Primary | ICD-10-CM

## 2021-01-29 DIAGNOSIS — M25.661 DECREASED RANGE OF MOTION (ROM) OF RIGHT KNEE: ICD-10-CM

## 2021-01-29 DIAGNOSIS — M62.81 QUADRICEPS WEAKNESS: ICD-10-CM

## 2021-01-29 DIAGNOSIS — J98.01 BRONCHOSPASM: ICD-10-CM

## 2021-01-29 DIAGNOSIS — R26.2 DIFFICULTY IN WALKING: ICD-10-CM

## 2021-01-29 PROCEDURE — 97110 THERAPEUTIC EXERCISES: CPT | Mod: PN

## 2021-01-29 RX ORDER — IPRATROPIUM BROMIDE AND ALBUTEROL SULFATE 2.5; .5 MG/3ML; MG/3ML
3 SOLUTION RESPIRATORY (INHALATION) EVERY 6 HOURS PRN
Qty: 100 VIAL | Refills: 1 | Status: SHIPPED | OUTPATIENT
Start: 2021-01-29 | End: 2022-07-12 | Stop reason: SDUPTHER

## 2021-02-01 ENCOUNTER — OFFICE VISIT (OUTPATIENT)
Dept: FAMILY MEDICINE | Facility: CLINIC | Age: 36
End: 2021-02-01
Payer: COMMERCIAL

## 2021-02-01 ENCOUNTER — CLINICAL SUPPORT (OUTPATIENT)
Dept: REHABILITATION | Facility: HOSPITAL | Age: 36
End: 2021-02-01
Attending: ORTHOPAEDIC SURGERY
Payer: COMMERCIAL

## 2021-02-01 VITALS
OXYGEN SATURATION: 97 % | SYSTOLIC BLOOD PRESSURE: 104 MMHG | TEMPERATURE: 98 F | BODY MASS INDEX: 32.02 KG/M2 | DIASTOLIC BLOOD PRESSURE: 60 MMHG | WEIGHT: 204 LBS | HEART RATE: 77 BPM | HEIGHT: 67 IN

## 2021-02-01 DIAGNOSIS — S83.511A COMPLETE TEAR OF RIGHT ACL, INITIAL ENCOUNTER: Primary | ICD-10-CM

## 2021-02-01 DIAGNOSIS — M25.661 DECREASED RANGE OF MOTION (ROM) OF RIGHT KNEE: Primary | ICD-10-CM

## 2021-02-01 DIAGNOSIS — M25.561 ACUTE PAIN OF RIGHT KNEE: ICD-10-CM

## 2021-02-01 DIAGNOSIS — M62.81 QUADRICEPS WEAKNESS: ICD-10-CM

## 2021-02-01 DIAGNOSIS — R68.89 DIFFICULTY PARTICIPATING IN SPORTING ACTIVITIES: ICD-10-CM

## 2021-02-01 DIAGNOSIS — M25.469 SWELLING OF KNEE: ICD-10-CM

## 2021-02-01 DIAGNOSIS — R26.2 DIFFICULTY IN WALKING: ICD-10-CM

## 2021-02-01 PROCEDURE — 99214 OFFICE O/P EST MOD 30 MIN: CPT | Mod: S$GLB,,, | Performed by: FAMILY MEDICINE

## 2021-02-01 PROCEDURE — 99999 PR PBB SHADOW E&M-EST. PATIENT-LVL III: ICD-10-PCS | Mod: PBBFAC,,, | Performed by: FAMILY MEDICINE

## 2021-02-01 PROCEDURE — 97110 THERAPEUTIC EXERCISES: CPT | Mod: PN

## 2021-02-01 PROCEDURE — 1125F AMNT PAIN NOTED PAIN PRSNT: CPT | Mod: S$GLB,,, | Performed by: FAMILY MEDICINE

## 2021-02-01 PROCEDURE — 3008F BODY MASS INDEX DOCD: CPT | Mod: CPTII,S$GLB,, | Performed by: FAMILY MEDICINE

## 2021-02-01 PROCEDURE — 3008F PR BODY MASS INDEX (BMI) DOCUMENTED: ICD-10-PCS | Mod: CPTII,S$GLB,, | Performed by: FAMILY MEDICINE

## 2021-02-01 PROCEDURE — 99214 PR OFFICE/OUTPT VISIT, EST, LEVL IV, 30-39 MIN: ICD-10-PCS | Mod: S$GLB,,, | Performed by: FAMILY MEDICINE

## 2021-02-01 PROCEDURE — 1125F PR PAIN SEVERITY QUANTIFIED, PAIN PRESENT: ICD-10-PCS | Mod: S$GLB,,, | Performed by: FAMILY MEDICINE

## 2021-02-01 PROCEDURE — 99999 PR PBB SHADOW E&M-EST. PATIENT-LVL III: CPT | Mod: PBBFAC,,, | Performed by: FAMILY MEDICINE

## 2021-02-01 RX ORDER — MELOXICAM 15 MG/1
15 TABLET ORAL DAILY PRN
Qty: 90 TABLET | Refills: 1 | Status: SHIPPED | OUTPATIENT
Start: 2021-02-01 | End: 2021-03-15

## 2021-02-01 RX ORDER — HYDROCODONE BITARTRATE AND ACETAMINOPHEN 5; 325 MG/1; MG/1
1 TABLET ORAL DAILY PRN
Qty: 30 TABLET | Refills: 0 | Status: SHIPPED | OUTPATIENT
Start: 2021-02-01 | End: 2021-03-15

## 2021-02-01 RX ORDER — INFLUENZA A VIRUS A/VICTORIA/2454/2019 IVR-207 (H1N1) ANTIGEN (PROPIOLACTONE INACTIVATED), INFLUENZA A VIRUS A/HONG KONG/2671/2019 IVR-208 (H3N2) ANTIGEN (PROPIOLACTONE INACTIVATED), INFLUENZA B VIRUS B/VICTORIA/705/2018 BVR-11 ANTIGEN (PROPIOLACTONE INACTIVATED), INFLUENZA B VIRUS B/PHUKET/3073/2013 BVR-1B ANTIGEN (PROPIOLACTONE INACTIVATED) 15; 15; 15; 15 UG/.5ML; UG/.5ML; UG/.5ML; UG/.5ML
INJECTION, SUSPENSION INTRAMUSCULAR
COMMUNITY
Start: 2020-11-30

## 2021-02-02 ENCOUNTER — CLINICAL SUPPORT (OUTPATIENT)
Dept: REHABILITATION | Facility: HOSPITAL | Age: 36
End: 2021-02-02
Attending: ORTHOPAEDIC SURGERY
Payer: COMMERCIAL

## 2021-02-02 DIAGNOSIS — R68.89 DIFFICULTY PARTICIPATING IN SPORTING ACTIVITIES: ICD-10-CM

## 2021-02-02 DIAGNOSIS — M25.561 ACUTE PAIN OF RIGHT KNEE: ICD-10-CM

## 2021-02-02 DIAGNOSIS — M25.469 SWELLING OF KNEE: ICD-10-CM

## 2021-02-02 DIAGNOSIS — R26.2 DIFFICULTY IN WALKING: ICD-10-CM

## 2021-02-02 DIAGNOSIS — M25.661 DECREASED RANGE OF MOTION (ROM) OF RIGHT KNEE: ICD-10-CM

## 2021-02-02 DIAGNOSIS — M62.81 QUADRICEPS WEAKNESS: Primary | ICD-10-CM

## 2021-02-02 PROCEDURE — 97110 THERAPEUTIC EXERCISES: CPT | Mod: PN

## 2021-02-03 ENCOUNTER — PATIENT MESSAGE (OUTPATIENT)
Dept: REHABILITATION | Facility: HOSPITAL | Age: 36
End: 2021-02-03

## 2021-02-03 ENCOUNTER — CLINICAL SUPPORT (OUTPATIENT)
Dept: REHABILITATION | Facility: HOSPITAL | Age: 36
End: 2021-02-03
Attending: ORTHOPAEDIC SURGERY
Payer: COMMERCIAL

## 2021-02-03 DIAGNOSIS — M25.469 SWELLING OF KNEE: ICD-10-CM

## 2021-02-03 DIAGNOSIS — M25.661 DECREASED RANGE OF MOTION (ROM) OF RIGHT KNEE: ICD-10-CM

## 2021-02-03 DIAGNOSIS — R26.2 DIFFICULTY IN WALKING: ICD-10-CM

## 2021-02-03 DIAGNOSIS — M62.81 QUADRICEPS WEAKNESS: ICD-10-CM

## 2021-02-03 DIAGNOSIS — R68.89 DIFFICULTY PARTICIPATING IN SPORTING ACTIVITIES: Primary | ICD-10-CM

## 2021-02-03 DIAGNOSIS — M25.561 ACUTE PAIN OF RIGHT KNEE: ICD-10-CM

## 2021-02-03 PROCEDURE — 97140 MANUAL THERAPY 1/> REGIONS: CPT | Mod: PN

## 2021-02-03 PROCEDURE — 97110 THERAPEUTIC EXERCISES: CPT | Mod: PN

## 2021-02-04 ENCOUNTER — CLINICAL SUPPORT (OUTPATIENT)
Dept: REHABILITATION | Facility: HOSPITAL | Age: 36
End: 2021-02-04
Attending: ORTHOPAEDIC SURGERY
Payer: COMMERCIAL

## 2021-02-04 DIAGNOSIS — S83.511A RUPTURE OF ANTERIOR CRUCIATE LIGAMENT OF RIGHT KNEE, INITIAL ENCOUNTER: ICD-10-CM

## 2021-02-04 DIAGNOSIS — M25.561 ACUTE PAIN OF RIGHT KNEE: ICD-10-CM

## 2021-02-04 DIAGNOSIS — M25.661 DECREASED RANGE OF MOTION (ROM) OF RIGHT KNEE: ICD-10-CM

## 2021-02-04 DIAGNOSIS — R26.2 DIFFICULTY IN WALKING: ICD-10-CM

## 2021-02-04 DIAGNOSIS — R68.89 DIFFICULTY PARTICIPATING IN SPORTING ACTIVITIES: Primary | ICD-10-CM

## 2021-02-04 DIAGNOSIS — M62.81 QUADRICEPS WEAKNESS: ICD-10-CM

## 2021-02-04 DIAGNOSIS — S83.511A COMPLETE TEAR OF RIGHT ACL, INITIAL ENCOUNTER: ICD-10-CM

## 2021-02-04 DIAGNOSIS — M25.469 SWELLING OF KNEE: ICD-10-CM

## 2021-02-04 DIAGNOSIS — S83.241A ACUTE MEDIAL MENISCUS TEAR OF RIGHT KNEE, INITIAL ENCOUNTER: ICD-10-CM

## 2021-02-04 DIAGNOSIS — G89.18 POST-OPERATIVE PAIN: ICD-10-CM

## 2021-02-04 PROCEDURE — 97110 THERAPEUTIC EXERCISES: CPT | Mod: PN,CQ

## 2021-02-04 PROCEDURE — 97140 MANUAL THERAPY 1/> REGIONS: CPT | Mod: PN,CQ

## 2021-02-05 ENCOUNTER — CLINICAL SUPPORT (OUTPATIENT)
Dept: REHABILITATION | Facility: HOSPITAL | Age: 36
End: 2021-02-05
Attending: ORTHOPAEDIC SURGERY
Payer: COMMERCIAL

## 2021-02-05 PROCEDURE — 97110 THERAPEUTIC EXERCISES: CPT | Mod: PN,CQ

## 2021-02-10 ENCOUNTER — CLINICAL SUPPORT (OUTPATIENT)
Dept: REHABILITATION | Facility: HOSPITAL | Age: 36
End: 2021-02-10
Attending: ORTHOPAEDIC SURGERY
Payer: COMMERCIAL

## 2021-02-10 DIAGNOSIS — M25.661 DECREASED RANGE OF MOTION (ROM) OF RIGHT KNEE: Primary | ICD-10-CM

## 2021-02-10 DIAGNOSIS — R68.89 DIFFICULTY PARTICIPATING IN SPORTING ACTIVITIES: ICD-10-CM

## 2021-02-10 DIAGNOSIS — R26.2 DIFFICULTY IN WALKING: ICD-10-CM

## 2021-02-10 DIAGNOSIS — M62.81 QUADRICEPS WEAKNESS: ICD-10-CM

## 2021-02-10 DIAGNOSIS — M25.561 ACUTE PAIN OF RIGHT KNEE: ICD-10-CM

## 2021-02-10 DIAGNOSIS — M25.469 SWELLING OF KNEE: ICD-10-CM

## 2021-02-10 PROCEDURE — 97110 THERAPEUTIC EXERCISES: CPT | Mod: PN

## 2021-02-11 ENCOUNTER — OFFICE VISIT (OUTPATIENT)
Dept: ORTHOPEDICS | Facility: CLINIC | Age: 36
End: 2021-02-11
Payer: COMMERCIAL

## 2021-02-11 VITALS
WEIGHT: 204.13 LBS | HEART RATE: 97 BPM | HEIGHT: 67 IN | RESPIRATION RATE: 16 BRPM | SYSTOLIC BLOOD PRESSURE: 122 MMHG | BODY MASS INDEX: 32.04 KG/M2 | DIASTOLIC BLOOD PRESSURE: 82 MMHG | TEMPERATURE: 99 F | OXYGEN SATURATION: 97 %

## 2021-02-11 DIAGNOSIS — M24.661 ARTHROFIBROSIS OF KNEE JOINT, RIGHT: Primary | ICD-10-CM

## 2021-02-11 PROCEDURE — 99024 POSTOP FOLLOW-UP VISIT: CPT | Mod: S$GLB,,, | Performed by: ORTHOPAEDIC SURGERY

## 2021-02-11 PROCEDURE — 3008F BODY MASS INDEX DOCD: CPT | Mod: CPTII,S$GLB,, | Performed by: ORTHOPAEDIC SURGERY

## 2021-02-11 PROCEDURE — 1125F AMNT PAIN NOTED PAIN PRSNT: CPT | Mod: S$GLB,,, | Performed by: ORTHOPAEDIC SURGERY

## 2021-02-11 PROCEDURE — 1125F PR PAIN SEVERITY QUANTIFIED, PAIN PRESENT: ICD-10-PCS | Mod: S$GLB,,, | Performed by: ORTHOPAEDIC SURGERY

## 2021-02-11 PROCEDURE — 99999 PR PBB SHADOW E&M-EST. PATIENT-LVL III: ICD-10-PCS | Mod: PBBFAC,,, | Performed by: ORTHOPAEDIC SURGERY

## 2021-02-11 PROCEDURE — 3008F PR BODY MASS INDEX (BMI) DOCUMENTED: ICD-10-PCS | Mod: CPTII,S$GLB,, | Performed by: ORTHOPAEDIC SURGERY

## 2021-02-11 PROCEDURE — 99999 PR PBB SHADOW E&M-EST. PATIENT-LVL III: CPT | Mod: PBBFAC,,, | Performed by: ORTHOPAEDIC SURGERY

## 2021-02-11 PROCEDURE — 99024 PR POST-OP FOLLOW-UP VISIT: ICD-10-PCS | Mod: S$GLB,,, | Performed by: ORTHOPAEDIC SURGERY

## 2021-02-12 ENCOUNTER — DOCUMENTATION ONLY (OUTPATIENT)
Dept: REHABILITATION | Facility: HOSPITAL | Age: 36
End: 2021-02-12

## 2021-02-15 ENCOUNTER — DOCUMENTATION ONLY (OUTPATIENT)
Dept: REHABILITATION | Facility: HOSPITAL | Age: 36
End: 2021-02-15

## 2021-02-15 ENCOUNTER — PATIENT MESSAGE (OUTPATIENT)
Dept: REHABILITATION | Facility: HOSPITAL | Age: 36
End: 2021-02-15

## 2021-02-19 ENCOUNTER — PATIENT MESSAGE (OUTPATIENT)
Dept: REHABILITATION | Facility: HOSPITAL | Age: 36
End: 2021-02-19

## 2021-02-23 ENCOUNTER — PATIENT MESSAGE (OUTPATIENT)
Dept: ORTHOPEDICS | Facility: CLINIC | Age: 36
End: 2021-02-23

## 2021-02-24 ENCOUNTER — CLINICAL SUPPORT (OUTPATIENT)
Dept: REHABILITATION | Facility: HOSPITAL | Age: 36
End: 2021-02-24
Attending: ORTHOPAEDIC SURGERY
Payer: COMMERCIAL

## 2021-02-24 DIAGNOSIS — R26.2 DIFFICULTY IN WALKING: ICD-10-CM

## 2021-02-24 DIAGNOSIS — M25.561 ACUTE PAIN OF RIGHT KNEE: ICD-10-CM

## 2021-02-24 DIAGNOSIS — M25.469 SWELLING OF KNEE: ICD-10-CM

## 2021-02-24 DIAGNOSIS — M25.661 DECREASED RANGE OF MOTION (ROM) OF RIGHT KNEE: Primary | ICD-10-CM

## 2021-02-24 DIAGNOSIS — M62.81 QUADRICEPS WEAKNESS: ICD-10-CM

## 2021-02-24 DIAGNOSIS — R68.89 DIFFICULTY PARTICIPATING IN SPORTING ACTIVITIES: ICD-10-CM

## 2021-02-24 PROCEDURE — 97110 THERAPEUTIC EXERCISES: CPT | Mod: PN

## 2021-02-26 ENCOUNTER — CLINICAL SUPPORT (OUTPATIENT)
Dept: REHABILITATION | Facility: HOSPITAL | Age: 36
End: 2021-02-26
Attending: ORTHOPAEDIC SURGERY
Payer: COMMERCIAL

## 2021-02-26 DIAGNOSIS — M62.81 QUADRICEPS WEAKNESS: ICD-10-CM

## 2021-02-26 DIAGNOSIS — M25.561 ACUTE PAIN OF RIGHT KNEE: Primary | ICD-10-CM

## 2021-02-26 DIAGNOSIS — R68.89 DIFFICULTY PARTICIPATING IN SPORTING ACTIVITIES: ICD-10-CM

## 2021-02-26 DIAGNOSIS — M25.469 SWELLING OF KNEE: ICD-10-CM

## 2021-02-26 DIAGNOSIS — R26.2 DIFFICULTY IN WALKING: ICD-10-CM

## 2021-02-26 PROCEDURE — 97110 THERAPEUTIC EXERCISES: CPT | Mod: PN

## 2021-03-03 ENCOUNTER — DOCUMENTATION ONLY (OUTPATIENT)
Dept: REHABILITATION | Facility: HOSPITAL | Age: 36
End: 2021-03-03

## 2021-03-05 ENCOUNTER — DOCUMENTATION ONLY (OUTPATIENT)
Dept: REHABILITATION | Facility: HOSPITAL | Age: 36
End: 2021-03-05

## 2021-03-08 ENCOUNTER — CLINICAL SUPPORT (OUTPATIENT)
Dept: REHABILITATION | Facility: HOSPITAL | Age: 36
End: 2021-03-08
Payer: COMMERCIAL

## 2021-03-08 DIAGNOSIS — R68.89 DIFFICULTY PARTICIPATING IN SPORTING ACTIVITIES: ICD-10-CM

## 2021-03-08 DIAGNOSIS — R26.2 DIFFICULTY IN WALKING: ICD-10-CM

## 2021-03-08 DIAGNOSIS — M62.81 QUADRICEPS WEAKNESS: Primary | ICD-10-CM

## 2021-03-08 DIAGNOSIS — M25.661 DECREASED RANGE OF MOTION (ROM) OF RIGHT KNEE: ICD-10-CM

## 2021-03-08 DIAGNOSIS — M25.561 ACUTE PAIN OF RIGHT KNEE: ICD-10-CM

## 2021-03-08 DIAGNOSIS — M25.469 SWELLING OF KNEE: ICD-10-CM

## 2021-03-08 PROCEDURE — 97110 THERAPEUTIC EXERCISES: CPT | Mod: PN

## 2021-03-10 ENCOUNTER — DOCUMENTATION ONLY (OUTPATIENT)
Dept: REHABILITATION | Facility: HOSPITAL | Age: 36
End: 2021-03-10

## 2021-03-15 ENCOUNTER — OFFICE VISIT (OUTPATIENT)
Dept: ORTHOPEDICS | Facility: CLINIC | Age: 36
End: 2021-03-15
Payer: COMMERCIAL

## 2021-03-15 VITALS
HEART RATE: 77 BPM | HEIGHT: 67 IN | DIASTOLIC BLOOD PRESSURE: 80 MMHG | WEIGHT: 203.69 LBS | SYSTOLIC BLOOD PRESSURE: 130 MMHG | BODY MASS INDEX: 31.97 KG/M2 | OXYGEN SATURATION: 97 % | RESPIRATION RATE: 18 BRPM

## 2021-03-15 DIAGNOSIS — M24.661 ARTHROFIBROSIS OF KNEE JOINT, RIGHT: Primary | ICD-10-CM

## 2021-03-15 PROCEDURE — 3008F BODY MASS INDEX DOCD: CPT | Mod: CPTII,S$GLB,, | Performed by: ORTHOPAEDIC SURGERY

## 2021-03-15 PROCEDURE — 99999 PR PBB SHADOW E&M-EST. PATIENT-LVL III: CPT | Mod: PBBFAC,,, | Performed by: ORTHOPAEDIC SURGERY

## 2021-03-15 PROCEDURE — 99024 POSTOP FOLLOW-UP VISIT: CPT | Mod: S$GLB,,, | Performed by: ORTHOPAEDIC SURGERY

## 2021-03-15 PROCEDURE — 99999 PR PBB SHADOW E&M-EST. PATIENT-LVL III: ICD-10-PCS | Mod: PBBFAC,,, | Performed by: ORTHOPAEDIC SURGERY

## 2021-03-15 PROCEDURE — 1125F PR PAIN SEVERITY QUANTIFIED, PAIN PRESENT: ICD-10-PCS | Mod: S$GLB,,, | Performed by: ORTHOPAEDIC SURGERY

## 2021-03-15 PROCEDURE — 99024 PR POST-OP FOLLOW-UP VISIT: ICD-10-PCS | Mod: S$GLB,,, | Performed by: ORTHOPAEDIC SURGERY

## 2021-03-15 PROCEDURE — 3008F PR BODY MASS INDEX (BMI) DOCUMENTED: ICD-10-PCS | Mod: CPTII,S$GLB,, | Performed by: ORTHOPAEDIC SURGERY

## 2021-03-15 PROCEDURE — 1125F AMNT PAIN NOTED PAIN PRSNT: CPT | Mod: S$GLB,,, | Performed by: ORTHOPAEDIC SURGERY

## 2021-03-24 ENCOUNTER — DOCUMENTATION ONLY (OUTPATIENT)
Dept: REHABILITATION | Facility: HOSPITAL | Age: 36
End: 2021-03-24

## 2021-03-30 ENCOUNTER — IMMUNIZATION (OUTPATIENT)
Dept: OBSTETRICS AND GYNECOLOGY | Facility: CLINIC | Age: 36
End: 2021-03-30
Payer: COMMERCIAL

## 2021-03-30 DIAGNOSIS — Z23 NEED FOR VACCINATION: Primary | ICD-10-CM

## 2021-03-30 PROCEDURE — 91300 COVID-19, MRNA, LNP-S, PF, 30 MCG/0.3 ML DOSE VACCINE: CPT | Mod: PBBFAC | Performed by: NURSE PRACTITIONER

## 2021-04-01 ENCOUNTER — DOCUMENTATION ONLY (OUTPATIENT)
Dept: REHABILITATION | Facility: HOSPITAL | Age: 36
End: 2021-04-01

## 2021-04-01 ENCOUNTER — PATIENT MESSAGE (OUTPATIENT)
Dept: REHABILITATION | Facility: HOSPITAL | Age: 36
End: 2021-04-01

## 2021-04-07 ENCOUNTER — DOCUMENTATION ONLY (OUTPATIENT)
Dept: REHABILITATION | Facility: HOSPITAL | Age: 36
End: 2021-04-07

## 2021-04-09 ENCOUNTER — CLINICAL SUPPORT (OUTPATIENT)
Dept: REHABILITATION | Facility: HOSPITAL | Age: 36
End: 2021-04-09

## 2021-04-09 DIAGNOSIS — M25.469 SWELLING OF KNEE: ICD-10-CM

## 2021-04-09 DIAGNOSIS — R26.2 DIFFICULTY IN WALKING: ICD-10-CM

## 2021-04-09 DIAGNOSIS — M25.561 ACUTE PAIN OF RIGHT KNEE: ICD-10-CM

## 2021-04-09 DIAGNOSIS — M25.661 DECREASED RANGE OF MOTION (ROM) OF RIGHT KNEE: ICD-10-CM

## 2021-04-09 DIAGNOSIS — R68.89 DIFFICULTY PARTICIPATING IN SPORTING ACTIVITIES: Primary | ICD-10-CM

## 2021-04-09 PROCEDURE — 97110 THERAPEUTIC EXERCISES: CPT | Mod: PN

## 2021-04-13 ENCOUNTER — DOCUMENTATION ONLY (OUTPATIENT)
Dept: REHABILITATION | Facility: HOSPITAL | Age: 36
End: 2021-04-13

## 2021-04-16 ENCOUNTER — DOCUMENTATION ONLY (OUTPATIENT)
Dept: REHABILITATION | Facility: HOSPITAL | Age: 36
End: 2021-04-16

## 2021-04-20 ENCOUNTER — IMMUNIZATION (OUTPATIENT)
Dept: OBSTETRICS AND GYNECOLOGY | Facility: CLINIC | Age: 36
End: 2021-04-20

## 2021-04-20 DIAGNOSIS — Z23 NEED FOR VACCINATION: Primary | ICD-10-CM

## 2021-04-20 PROCEDURE — 0002A COVID-19, MRNA, LNP-S, PF, 30 MCG/0.3 ML DOSE VACCINE: CPT | Mod: CV19,S$GLB,, | Performed by: FAMILY MEDICINE

## 2021-04-20 PROCEDURE — 91300 COVID-19, MRNA, LNP-S, PF, 30 MCG/0.3 ML DOSE VACCINE: ICD-10-PCS | Mod: S$GLB,,, | Performed by: FAMILY MEDICINE

## 2021-04-20 PROCEDURE — 0002A COVID-19, MRNA, LNP-S, PF, 30 MCG/0.3 ML DOSE VACCINE: ICD-10-PCS | Mod: CV19,S$GLB,, | Performed by: FAMILY MEDICINE

## 2021-04-20 PROCEDURE — 91300 COVID-19, MRNA, LNP-S, PF, 30 MCG/0.3 ML DOSE VACCINE: CPT | Mod: S$GLB,,, | Performed by: FAMILY MEDICINE

## 2021-04-26 ENCOUNTER — OFFICE VISIT (OUTPATIENT)
Dept: ORTHOPEDICS | Facility: CLINIC | Age: 36
End: 2021-04-26

## 2021-04-26 VITALS
DIASTOLIC BLOOD PRESSURE: 80 MMHG | WEIGHT: 195.75 LBS | HEIGHT: 67 IN | RESPIRATION RATE: 18 BRPM | OXYGEN SATURATION: 97 % | BODY MASS INDEX: 30.72 KG/M2 | HEART RATE: 75 BPM | SYSTOLIC BLOOD PRESSURE: 120 MMHG

## 2021-04-26 DIAGNOSIS — M24.661 ARTHROFIBROSIS OF KNEE JOINT, RIGHT: Primary | ICD-10-CM

## 2021-04-26 PROCEDURE — 99213 OFFICE O/P EST LOW 20 MIN: CPT | Mod: PBBFAC,PN | Performed by: ORTHOPAEDIC SURGERY

## 2021-04-26 PROCEDURE — 99024 POSTOP FOLLOW-UP VISIT: CPT | Mod: ,,, | Performed by: ORTHOPAEDIC SURGERY

## 2021-04-26 PROCEDURE — 99999 PR PBB SHADOW E&M-EST. PATIENT-LVL III: ICD-10-PCS | Mod: PBBFAC,,, | Performed by: ORTHOPAEDIC SURGERY

## 2021-04-26 PROCEDURE — 99999 PR PBB SHADOW E&M-EST. PATIENT-LVL III: CPT | Mod: PBBFAC,,, | Performed by: ORTHOPAEDIC SURGERY

## 2021-04-26 PROCEDURE — 99024 PR POST-OP FOLLOW-UP VISIT: ICD-10-PCS | Mod: ,,, | Performed by: ORTHOPAEDIC SURGERY

## 2021-04-30 ENCOUNTER — CLINICAL SUPPORT (OUTPATIENT)
Dept: REHABILITATION | Facility: HOSPITAL | Age: 36
End: 2021-04-30

## 2021-04-30 DIAGNOSIS — M25.561 ACUTE PAIN OF RIGHT KNEE: ICD-10-CM

## 2021-04-30 DIAGNOSIS — M62.81 QUADRICEPS WEAKNESS: Primary | ICD-10-CM

## 2021-04-30 DIAGNOSIS — M25.661 DECREASED RANGE OF MOTION (ROM) OF RIGHT KNEE: ICD-10-CM

## 2021-04-30 PROCEDURE — 97110 THERAPEUTIC EXERCISES: CPT | Mod: PN,CQ

## 2021-05-07 ENCOUNTER — PATIENT MESSAGE (OUTPATIENT)
Dept: ORTHOPEDICS | Facility: CLINIC | Age: 36
End: 2021-05-07

## 2021-05-12 ENCOUNTER — PATIENT MESSAGE (OUTPATIENT)
Dept: REHABILITATION | Facility: HOSPITAL | Age: 36
End: 2021-05-12

## 2021-05-12 ENCOUNTER — DOCUMENTATION ONLY (OUTPATIENT)
Dept: REHABILITATION | Facility: HOSPITAL | Age: 36
End: 2021-05-12

## 2021-05-18 ENCOUNTER — PATIENT MESSAGE (OUTPATIENT)
Dept: ORTHOPEDICS | Facility: CLINIC | Age: 36
End: 2021-05-18

## 2021-07-02 DIAGNOSIS — J45.20 MILD INTERMITTENT ASTHMA WITHOUT COMPLICATION: ICD-10-CM

## 2021-07-06 RX ORDER — BECLOMETHASONE DIPROPIONATE HFA 40 UG/1
AEROSOL, METERED RESPIRATORY (INHALATION)
Qty: 31.8 G | Refills: 1 | Status: SHIPPED | OUTPATIENT
Start: 2021-07-06 | End: 2021-11-23 | Stop reason: SDUPTHER

## 2021-07-07 RX ORDER — ALBUTEROL SULFATE 90 UG/1
2 AEROSOL, METERED RESPIRATORY (INHALATION) EVERY 6 HOURS PRN
Qty: 54 G | Refills: 1 | Status: SHIPPED | OUTPATIENT
Start: 2021-07-07 | End: 2021-11-23 | Stop reason: SDUPTHER

## 2021-08-13 ENCOUNTER — OFFICE VISIT (OUTPATIENT)
Dept: FAMILY MEDICINE | Facility: CLINIC | Age: 36
End: 2021-08-13
Payer: COMMERCIAL

## 2021-08-13 VITALS
HEART RATE: 77 BPM | BODY MASS INDEX: 30.1 KG/M2 | WEIGHT: 191.81 LBS | HEIGHT: 67 IN | SYSTOLIC BLOOD PRESSURE: 120 MMHG | DIASTOLIC BLOOD PRESSURE: 80 MMHG | OXYGEN SATURATION: 97 % | TEMPERATURE: 99 F

## 2021-08-13 DIAGNOSIS — G56.01 CARPAL TUNNEL SYNDROME OF RIGHT WRIST: Primary | ICD-10-CM

## 2021-08-13 PROCEDURE — 99214 PR OFFICE/OUTPT VISIT, EST, LEVL IV, 30-39 MIN: ICD-10-PCS | Mod: S$GLB,,, | Performed by: FAMILY MEDICINE

## 2021-08-13 PROCEDURE — 1160F PR REVIEW ALL MEDS BY PRESCRIBER/CLIN PHARMACIST DOCUMENTED: ICD-10-PCS | Mod: CPTII,S$GLB,, | Performed by: FAMILY MEDICINE

## 2021-08-13 PROCEDURE — 1125F PR PAIN SEVERITY QUANTIFIED, PAIN PRESENT: ICD-10-PCS | Mod: CPTII,S$GLB,, | Performed by: FAMILY MEDICINE

## 2021-08-13 PROCEDURE — 1160F RVW MEDS BY RX/DR IN RCRD: CPT | Mod: CPTII,S$GLB,, | Performed by: FAMILY MEDICINE

## 2021-08-13 PROCEDURE — 3008F BODY MASS INDEX DOCD: CPT | Mod: CPTII,S$GLB,, | Performed by: FAMILY MEDICINE

## 2021-08-13 PROCEDURE — 3079F PR MOST RECENT DIASTOLIC BLOOD PRESSURE 80-89 MM HG: ICD-10-PCS | Mod: CPTII,S$GLB,, | Performed by: FAMILY MEDICINE

## 2021-08-13 PROCEDURE — 99214 OFFICE O/P EST MOD 30 MIN: CPT | Mod: S$GLB,,, | Performed by: FAMILY MEDICINE

## 2021-08-13 PROCEDURE — 1159F PR MEDICATION LIST DOCUMENTED IN MEDICAL RECORD: ICD-10-PCS | Mod: CPTII,S$GLB,, | Performed by: FAMILY MEDICINE

## 2021-08-13 PROCEDURE — 3074F SYST BP LT 130 MM HG: CPT | Mod: CPTII,S$GLB,, | Performed by: FAMILY MEDICINE

## 2021-08-13 PROCEDURE — 99999 PR PBB SHADOW E&M-EST. PATIENT-LVL IV: CPT | Mod: PBBFAC,,, | Performed by: FAMILY MEDICINE

## 2021-08-13 PROCEDURE — 3079F DIAST BP 80-89 MM HG: CPT | Mod: CPTII,S$GLB,, | Performed by: FAMILY MEDICINE

## 2021-08-13 PROCEDURE — 99999 PR PBB SHADOW E&M-EST. PATIENT-LVL IV: ICD-10-PCS | Mod: PBBFAC,,, | Performed by: FAMILY MEDICINE

## 2021-08-13 PROCEDURE — 3008F PR BODY MASS INDEX (BMI) DOCUMENTED: ICD-10-PCS | Mod: CPTII,S$GLB,, | Performed by: FAMILY MEDICINE

## 2021-08-13 PROCEDURE — 3074F PR MOST RECENT SYSTOLIC BLOOD PRESSURE < 130 MM HG: ICD-10-PCS | Mod: CPTII,S$GLB,, | Performed by: FAMILY MEDICINE

## 2021-08-13 PROCEDURE — 1125F AMNT PAIN NOTED PAIN PRSNT: CPT | Mod: CPTII,S$GLB,, | Performed by: FAMILY MEDICINE

## 2021-08-13 PROCEDURE — 1159F MED LIST DOCD IN RCRD: CPT | Mod: CPTII,S$GLB,, | Performed by: FAMILY MEDICINE

## 2021-09-07 ENCOUNTER — OFFICE VISIT (OUTPATIENT)
Dept: ORTHOPEDICS | Facility: CLINIC | Age: 36
End: 2021-09-07
Payer: COMMERCIAL

## 2021-09-07 ENCOUNTER — OFFICE VISIT (OUTPATIENT)
Dept: FAMILY MEDICINE | Facility: CLINIC | Age: 36
End: 2021-09-07
Payer: COMMERCIAL

## 2021-09-07 DIAGNOSIS — G56.02 CARPAL TUNNEL SYNDROME OF LEFT WRIST: Primary | ICD-10-CM

## 2021-09-07 DIAGNOSIS — G56.01 CARPAL TUNNEL SYNDROME OF RIGHT WRIST: Primary | ICD-10-CM

## 2021-09-07 DIAGNOSIS — G56.01 CARPAL TUNNEL SYNDROME ON RIGHT: ICD-10-CM

## 2021-09-07 PROCEDURE — 1159F PR MEDICATION LIST DOCUMENTED IN MEDICAL RECORD: ICD-10-PCS | Mod: CPTII,,, | Performed by: FAMILY MEDICINE

## 2021-09-07 PROCEDURE — 99213 PR OFFICE/OUTPT VISIT, EST, LEVL III, 20-29 MIN: ICD-10-PCS | Mod: 95,,, | Performed by: FAMILY MEDICINE

## 2021-09-07 PROCEDURE — 1160F PR REVIEW ALL MEDS BY PRESCRIBER/CLIN PHARMACIST DOCUMENTED: ICD-10-PCS | Mod: CPTII,,, | Performed by: ORTHOPAEDIC SURGERY

## 2021-09-07 PROCEDURE — 1159F MED LIST DOCD IN RCRD: CPT | Mod: CPTII,,, | Performed by: FAMILY MEDICINE

## 2021-09-07 PROCEDURE — 1159F MED LIST DOCD IN RCRD: CPT | Mod: CPTII,,, | Performed by: PHYSICIAN ASSISTANT

## 2021-09-07 PROCEDURE — 99212 OFFICE O/P EST SF 10 MIN: CPT | Mod: 95,,, | Performed by: PHYSICIAN ASSISTANT

## 2021-09-07 PROCEDURE — 1160F RVW MEDS BY RX/DR IN RCRD: CPT | Mod: CPTII,,, | Performed by: FAMILY MEDICINE

## 2021-09-07 PROCEDURE — 99213 PR OFFICE/OUTPT VISIT, EST, LEVL III, 20-29 MIN: ICD-10-PCS | Mod: 95,,, | Performed by: ORTHOPAEDIC SURGERY

## 2021-09-07 PROCEDURE — 1160F RVW MEDS BY RX/DR IN RCRD: CPT | Mod: CPTII,,, | Performed by: PHYSICIAN ASSISTANT

## 2021-09-07 PROCEDURE — 1159F PR MEDICATION LIST DOCUMENTED IN MEDICAL RECORD: ICD-10-PCS | Mod: CPTII,,, | Performed by: PHYSICIAN ASSISTANT

## 2021-09-07 PROCEDURE — 1160F PR REVIEW ALL MEDS BY PRESCRIBER/CLIN PHARMACIST DOCUMENTED: ICD-10-PCS | Mod: CPTII,,, | Performed by: FAMILY MEDICINE

## 2021-09-07 PROCEDURE — 99213 OFFICE O/P EST LOW 20 MIN: CPT | Mod: 95,,, | Performed by: FAMILY MEDICINE

## 2021-09-07 PROCEDURE — 99212 PR OFFICE/OUTPT VISIT, EST, LEVL II, 10-19 MIN: ICD-10-PCS | Mod: 95,,, | Performed by: PHYSICIAN ASSISTANT

## 2021-09-07 PROCEDURE — 1160F PR REVIEW ALL MEDS BY PRESCRIBER/CLIN PHARMACIST DOCUMENTED: ICD-10-PCS | Mod: CPTII,,, | Performed by: PHYSICIAN ASSISTANT

## 2021-09-07 PROCEDURE — 1160F RVW MEDS BY RX/DR IN RCRD: CPT | Mod: CPTII,,, | Performed by: ORTHOPAEDIC SURGERY

## 2021-09-07 PROCEDURE — 1159F PR MEDICATION LIST DOCUMENTED IN MEDICAL RECORD: ICD-10-PCS | Mod: CPTII,,, | Performed by: ORTHOPAEDIC SURGERY

## 2021-09-07 PROCEDURE — 99213 OFFICE O/P EST LOW 20 MIN: CPT | Mod: 95,,, | Performed by: ORTHOPAEDIC SURGERY

## 2021-09-07 PROCEDURE — 1159F MED LIST DOCD IN RCRD: CPT | Mod: CPTII,,, | Performed by: ORTHOPAEDIC SURGERY

## 2021-09-07 RX ORDER — MELOXICAM 15 MG/1
15 TABLET ORAL DAILY
Qty: 30 TABLET | Refills: 1 | Status: SHIPPED | OUTPATIENT
Start: 2021-09-07 | End: 2022-01-25 | Stop reason: SDUPTHER

## 2021-09-08 ENCOUNTER — TELEPHONE (OUTPATIENT)
Dept: FAMILY MEDICINE | Facility: CLINIC | Age: 36
End: 2021-09-08

## 2021-09-13 ENCOUNTER — OFFICE VISIT (OUTPATIENT)
Dept: ORTHOPEDICS | Facility: CLINIC | Age: 36
End: 2021-09-13
Payer: COMMERCIAL

## 2021-09-13 VITALS
HEART RATE: 55 BPM | SYSTOLIC BLOOD PRESSURE: 110 MMHG | WEIGHT: 191.13 LBS | HEIGHT: 67 IN | BODY MASS INDEX: 30 KG/M2 | DIASTOLIC BLOOD PRESSURE: 72 MMHG | RESPIRATION RATE: 18 BRPM | OXYGEN SATURATION: 98 %

## 2021-09-13 DIAGNOSIS — G56.02 CARPAL TUNNEL SYNDROME OF LEFT WRIST: Primary | ICD-10-CM

## 2021-09-13 DIAGNOSIS — G56.01 CARPAL TUNNEL SYNDROME ON RIGHT: ICD-10-CM

## 2021-09-13 PROCEDURE — 3078F PR MOST RECENT DIASTOLIC BLOOD PRESSURE < 80 MM HG: ICD-10-PCS | Mod: CPTII,S$GLB,, | Performed by: ORTHOPAEDIC SURGERY

## 2021-09-13 PROCEDURE — 3078F DIAST BP <80 MM HG: CPT | Mod: CPTII,S$GLB,, | Performed by: ORTHOPAEDIC SURGERY

## 2021-09-13 PROCEDURE — 3074F PR MOST RECENT SYSTOLIC BLOOD PRESSURE < 130 MM HG: ICD-10-PCS | Mod: CPTII,S$GLB,, | Performed by: ORTHOPAEDIC SURGERY

## 2021-09-13 PROCEDURE — 99213 OFFICE O/P EST LOW 20 MIN: CPT | Mod: 25,S$GLB,, | Performed by: ORTHOPAEDIC SURGERY

## 2021-09-13 PROCEDURE — 20526 THER INJECTION CARP TUNNEL: CPT | Mod: 50,S$GLB,, | Performed by: ORTHOPAEDIC SURGERY

## 2021-09-13 PROCEDURE — 3008F PR BODY MASS INDEX (BMI) DOCUMENTED: ICD-10-PCS | Mod: CPTII,S$GLB,, | Performed by: ORTHOPAEDIC SURGERY

## 2021-09-13 PROCEDURE — 1160F PR REVIEW ALL MEDS BY PRESCRIBER/CLIN PHARMACIST DOCUMENTED: ICD-10-PCS | Mod: CPTII,S$GLB,, | Performed by: ORTHOPAEDIC SURGERY

## 2021-09-13 PROCEDURE — 1159F PR MEDICATION LIST DOCUMENTED IN MEDICAL RECORD: ICD-10-PCS | Mod: CPTII,S$GLB,, | Performed by: ORTHOPAEDIC SURGERY

## 2021-09-13 PROCEDURE — 3008F BODY MASS INDEX DOCD: CPT | Mod: CPTII,S$GLB,, | Performed by: ORTHOPAEDIC SURGERY

## 2021-09-13 PROCEDURE — 99999 PR PBB SHADOW E&M-EST. PATIENT-LVL IV: ICD-10-PCS | Mod: PBBFAC,,, | Performed by: ORTHOPAEDIC SURGERY

## 2021-09-13 PROCEDURE — 1159F MED LIST DOCD IN RCRD: CPT | Mod: CPTII,S$GLB,, | Performed by: ORTHOPAEDIC SURGERY

## 2021-09-13 PROCEDURE — 3074F SYST BP LT 130 MM HG: CPT | Mod: CPTII,S$GLB,, | Performed by: ORTHOPAEDIC SURGERY

## 2021-09-13 PROCEDURE — 99213 PR OFFICE/OUTPT VISIT, EST, LEVL III, 20-29 MIN: ICD-10-PCS | Mod: 25,S$GLB,, | Performed by: ORTHOPAEDIC SURGERY

## 2021-09-13 PROCEDURE — 20526 CARPAL TUNNEL: ICD-10-PCS | Mod: 50,S$GLB,, | Performed by: ORTHOPAEDIC SURGERY

## 2021-09-13 PROCEDURE — 99999 PR PBB SHADOW E&M-EST. PATIENT-LVL IV: CPT | Mod: PBBFAC,,, | Performed by: ORTHOPAEDIC SURGERY

## 2021-09-13 PROCEDURE — 1160F RVW MEDS BY RX/DR IN RCRD: CPT | Mod: CPTII,S$GLB,, | Performed by: ORTHOPAEDIC SURGERY

## 2021-09-13 RX ORDER — TRIAMCINOLONE ACETONIDE 40 MG/ML
40 INJECTION, SUSPENSION INTRA-ARTICULAR; INTRAMUSCULAR
Status: DISCONTINUED | OUTPATIENT
Start: 2021-09-13 | End: 2021-09-13 | Stop reason: HOSPADM

## 2021-09-13 RX ORDER — TRIAMCINOLONE ACETONIDE 40 MG/ML
20 INJECTION, SUSPENSION INTRA-ARTICULAR; INTRAMUSCULAR
Status: DISCONTINUED | OUTPATIENT
Start: 2021-09-13 | End: 2021-09-13 | Stop reason: HOSPADM

## 2021-09-13 RX ADMIN — TRIAMCINOLONE ACETONIDE 20 MG: 40 INJECTION, SUSPENSION INTRA-ARTICULAR; INTRAMUSCULAR at 08:09

## 2021-09-13 RX ADMIN — TRIAMCINOLONE ACETONIDE 40 MG: 40 INJECTION, SUSPENSION INTRA-ARTICULAR; INTRAMUSCULAR at 08:09

## 2021-11-17 ENCOUNTER — TELEPHONE (OUTPATIENT)
Dept: SPORTS MEDICINE | Facility: CLINIC | Age: 36
End: 2021-11-17
Payer: COMMERCIAL

## 2021-11-23 DIAGNOSIS — J45.20 MILD INTERMITTENT ASTHMA WITHOUT COMPLICATION: ICD-10-CM

## 2021-11-23 RX ORDER — ALBUTEROL SULFATE 90 UG/1
2 AEROSOL, METERED RESPIRATORY (INHALATION) EVERY 6 HOURS PRN
Qty: 54 G | Refills: 1 | Status: SHIPPED | OUTPATIENT
Start: 2021-11-23

## 2021-11-23 RX ORDER — BECLOMETHASONE DIPROPIONATE HFA 40 UG/1
AEROSOL, METERED RESPIRATORY (INHALATION)
Qty: 31.8 G | Refills: 1 | Status: SHIPPED | OUTPATIENT
Start: 2021-11-23

## 2021-12-13 ENCOUNTER — PATIENT MESSAGE (OUTPATIENT)
Dept: ORTHOPEDICS | Facility: CLINIC | Age: 36
End: 2021-12-13
Payer: COMMERCIAL

## 2021-12-14 ENCOUNTER — PATIENT OUTREACH (OUTPATIENT)
Dept: ADMINISTRATIVE | Facility: OTHER | Age: 36
End: 2021-12-14
Payer: COMMERCIAL

## 2021-12-14 DIAGNOSIS — M25.561 RIGHT KNEE PAIN, UNSPECIFIED CHRONICITY: Primary | ICD-10-CM

## 2021-12-15 ENCOUNTER — OFFICE VISIT (OUTPATIENT)
Dept: ORTHOPEDICS | Facility: CLINIC | Age: 36
End: 2021-12-15
Attending: ORTHOPAEDIC SURGERY
Payer: COMMERCIAL

## 2021-12-15 VITALS
BODY MASS INDEX: 29.9 KG/M2 | HEART RATE: 71 BPM | RESPIRATION RATE: 18 BRPM | WEIGHT: 190.5 LBS | SYSTOLIC BLOOD PRESSURE: 116 MMHG | OXYGEN SATURATION: 97 % | HEIGHT: 67 IN | DIASTOLIC BLOOD PRESSURE: 80 MMHG

## 2021-12-15 DIAGNOSIS — M25.561 RECURRENT PAIN OF RIGHT KNEE: Primary | ICD-10-CM

## 2021-12-15 PROCEDURE — 99213 PR OFFICE/OUTPT VISIT, EST, LEVL III, 20-29 MIN: ICD-10-PCS | Mod: 25,S$GLB,, | Performed by: ORTHOPAEDIC SURGERY

## 2021-12-15 PROCEDURE — 20610 LARGE JOINT ASPIRATION/INJECTION: R KNEE: ICD-10-PCS | Mod: RT,S$GLB,, | Performed by: ORTHOPAEDIC SURGERY

## 2021-12-15 PROCEDURE — 99999 PR PBB SHADOW E&M-EST. PATIENT-LVL III: CPT | Mod: PBBFAC,,, | Performed by: ORTHOPAEDIC SURGERY

## 2021-12-15 PROCEDURE — 99999 PR PBB SHADOW E&M-EST. PATIENT-LVL III: ICD-10-PCS | Mod: PBBFAC,,, | Performed by: ORTHOPAEDIC SURGERY

## 2021-12-15 PROCEDURE — 20610 DRAIN/INJ JOINT/BURSA W/O US: CPT | Mod: RT,S$GLB,, | Performed by: ORTHOPAEDIC SURGERY

## 2021-12-15 PROCEDURE — 99213 OFFICE O/P EST LOW 20 MIN: CPT | Mod: 25,S$GLB,, | Performed by: ORTHOPAEDIC SURGERY

## 2021-12-15 RX ADMIN — TRIAMCINOLONE ACETONIDE 80 MG: 40 INJECTION, SUSPENSION INTRA-ARTICULAR; INTRAMUSCULAR at 11:12

## 2021-12-17 ENCOUNTER — IMMUNIZATION (OUTPATIENT)
Dept: OBSTETRICS AND GYNECOLOGY | Facility: CLINIC | Age: 36
End: 2021-12-17
Payer: COMMERCIAL

## 2021-12-17 DIAGNOSIS — Z23 NEED FOR VACCINATION: Primary | ICD-10-CM

## 2021-12-17 PROCEDURE — 0004A COVID-19, MRNA, LNP-S, PF, 30 MCG/0.3 ML DOSE VACCINE: CPT | Mod: PBBFAC | Performed by: FAMILY MEDICINE

## 2021-12-17 RX ORDER — TRIAMCINOLONE ACETONIDE 40 MG/ML
80 INJECTION, SUSPENSION INTRA-ARTICULAR; INTRAMUSCULAR
Status: DISCONTINUED | OUTPATIENT
Start: 2021-12-15 | End: 2021-12-17 | Stop reason: HOSPADM

## 2021-12-27 ENCOUNTER — PATIENT MESSAGE (OUTPATIENT)
Dept: ORTHOPEDICS | Facility: CLINIC | Age: 36
End: 2021-12-27
Payer: COMMERCIAL

## 2021-12-29 ENCOUNTER — OFFICE VISIT (OUTPATIENT)
Dept: PODIATRY | Facility: CLINIC | Age: 36
End: 2021-12-29
Payer: COMMERCIAL

## 2021-12-29 VITALS
HEIGHT: 67 IN | BODY MASS INDEX: 29.82 KG/M2 | SYSTOLIC BLOOD PRESSURE: 143 MMHG | DIASTOLIC BLOOD PRESSURE: 65 MMHG | WEIGHT: 190 LBS | HEART RATE: 73 BPM

## 2021-12-29 DIAGNOSIS — B35.3 TINEA PEDIS OF BOTH FEET: Primary | ICD-10-CM

## 2021-12-29 DIAGNOSIS — M79.672 BILATERAL FOOT PAIN: ICD-10-CM

## 2021-12-29 DIAGNOSIS — M79.671 BILATERAL FOOT PAIN: ICD-10-CM

## 2021-12-29 PROCEDURE — 3078F PR MOST RECENT DIASTOLIC BLOOD PRESSURE < 80 MM HG: ICD-10-PCS | Mod: CPTII,S$GLB,, | Performed by: PODIATRIST

## 2021-12-29 PROCEDURE — 3008F PR BODY MASS INDEX (BMI) DOCUMENTED: ICD-10-PCS | Mod: CPTII,S$GLB,, | Performed by: PODIATRIST

## 2021-12-29 PROCEDURE — 1159F MED LIST DOCD IN RCRD: CPT | Mod: CPTII,S$GLB,, | Performed by: PODIATRIST

## 2021-12-29 PROCEDURE — 1160F PR REVIEW ALL MEDS BY PRESCRIBER/CLIN PHARMACIST DOCUMENTED: ICD-10-PCS | Mod: CPTII,S$GLB,, | Performed by: PODIATRIST

## 2021-12-29 PROCEDURE — 99203 PR OFFICE/OUTPT VISIT, NEW, LEVL III, 30-44 MIN: ICD-10-PCS | Mod: S$GLB,,, | Performed by: PODIATRIST

## 2021-12-29 PROCEDURE — 3077F SYST BP >= 140 MM HG: CPT | Mod: CPTII,S$GLB,, | Performed by: PODIATRIST

## 2021-12-29 PROCEDURE — 3077F PR MOST RECENT SYSTOLIC BLOOD PRESSURE >= 140 MM HG: ICD-10-PCS | Mod: CPTII,S$GLB,, | Performed by: PODIATRIST

## 2021-12-29 PROCEDURE — 3008F BODY MASS INDEX DOCD: CPT | Mod: CPTII,S$GLB,, | Performed by: PODIATRIST

## 2021-12-29 PROCEDURE — 3078F DIAST BP <80 MM HG: CPT | Mod: CPTII,S$GLB,, | Performed by: PODIATRIST

## 2021-12-29 PROCEDURE — 99999 PR PBB SHADOW E&M-EST. PATIENT-LVL III: CPT | Mod: PBBFAC,,, | Performed by: PODIATRIST

## 2021-12-29 PROCEDURE — 99203 OFFICE O/P NEW LOW 30 MIN: CPT | Mod: S$GLB,,, | Performed by: PODIATRIST

## 2021-12-29 PROCEDURE — 99999 PR PBB SHADOW E&M-EST. PATIENT-LVL III: ICD-10-PCS | Mod: PBBFAC,,, | Performed by: PODIATRIST

## 2021-12-29 PROCEDURE — 1160F RVW MEDS BY RX/DR IN RCRD: CPT | Mod: CPTII,S$GLB,, | Performed by: PODIATRIST

## 2021-12-29 PROCEDURE — 1159F PR MEDICATION LIST DOCUMENTED IN MEDICAL RECORD: ICD-10-PCS | Mod: CPTII,S$GLB,, | Performed by: PODIATRIST

## 2022-01-25 ENCOUNTER — PATIENT MESSAGE (OUTPATIENT)
Dept: ORTHOPEDICS | Facility: CLINIC | Age: 37
End: 2022-01-25

## 2022-01-25 ENCOUNTER — PATIENT OUTREACH (OUTPATIENT)
Dept: ADMINISTRATIVE | Facility: OTHER | Age: 37
End: 2022-01-25
Payer: COMMERCIAL

## 2022-01-25 ENCOUNTER — PATIENT MESSAGE (OUTPATIENT)
Dept: FAMILY MEDICINE | Facility: CLINIC | Age: 37
End: 2022-01-25
Payer: COMMERCIAL

## 2022-01-25 ENCOUNTER — OFFICE VISIT (OUTPATIENT)
Dept: ORTHOPEDICS | Facility: CLINIC | Age: 37
End: 2022-01-25
Payer: COMMERCIAL

## 2022-01-25 ENCOUNTER — TELEPHONE (OUTPATIENT)
Dept: SPORTS MEDICINE | Facility: CLINIC | Age: 37
End: 2022-01-25
Payer: COMMERCIAL

## 2022-01-25 VITALS
BODY MASS INDEX: 32.15 KG/M2 | DIASTOLIC BLOOD PRESSURE: 78 MMHG | WEIGHT: 204.81 LBS | OXYGEN SATURATION: 97 % | HEART RATE: 93 BPM | HEIGHT: 67 IN | RESPIRATION RATE: 18 BRPM | SYSTOLIC BLOOD PRESSURE: 124 MMHG

## 2022-01-25 DIAGNOSIS — G56.01 CARPAL TUNNEL SYNDROME ON RIGHT: ICD-10-CM

## 2022-01-25 DIAGNOSIS — G56.02 CARPAL TUNNEL SYNDROME OF LEFT WRIST: Primary | ICD-10-CM

## 2022-01-25 PROCEDURE — 99999 PR PBB SHADOW E&M-EST. PATIENT-LVL IV: ICD-10-PCS | Mod: PBBFAC,,, | Performed by: ORTHOPAEDIC SURGERY

## 2022-01-25 PROCEDURE — 1159F MED LIST DOCD IN RCRD: CPT | Mod: CPTII,S$GLB,, | Performed by: ORTHOPAEDIC SURGERY

## 2022-01-25 PROCEDURE — 3074F SYST BP LT 130 MM HG: CPT | Mod: CPTII,S$GLB,, | Performed by: ORTHOPAEDIC SURGERY

## 2022-01-25 PROCEDURE — 3078F DIAST BP <80 MM HG: CPT | Mod: CPTII,S$GLB,, | Performed by: ORTHOPAEDIC SURGERY

## 2022-01-25 PROCEDURE — 99999 PR PBB SHADOW E&M-EST. PATIENT-LVL IV: CPT | Mod: PBBFAC,,, | Performed by: ORTHOPAEDIC SURGERY

## 2022-01-25 PROCEDURE — 99213 PR OFFICE/OUTPT VISIT, EST, LEVL III, 20-29 MIN: ICD-10-PCS | Mod: S$GLB,,, | Performed by: ORTHOPAEDIC SURGERY

## 2022-01-25 PROCEDURE — 3078F PR MOST RECENT DIASTOLIC BLOOD PRESSURE < 80 MM HG: ICD-10-PCS | Mod: CPTII,S$GLB,, | Performed by: ORTHOPAEDIC SURGERY

## 2022-01-25 PROCEDURE — 3008F PR BODY MASS INDEX (BMI) DOCUMENTED: ICD-10-PCS | Mod: CPTII,S$GLB,, | Performed by: ORTHOPAEDIC SURGERY

## 2022-01-25 PROCEDURE — 3008F BODY MASS INDEX DOCD: CPT | Mod: CPTII,S$GLB,, | Performed by: ORTHOPAEDIC SURGERY

## 2022-01-25 PROCEDURE — 1160F RVW MEDS BY RX/DR IN RCRD: CPT | Mod: CPTII,S$GLB,, | Performed by: ORTHOPAEDIC SURGERY

## 2022-01-25 PROCEDURE — 1160F PR REVIEW ALL MEDS BY PRESCRIBER/CLIN PHARMACIST DOCUMENTED: ICD-10-PCS | Mod: CPTII,S$GLB,, | Performed by: ORTHOPAEDIC SURGERY

## 2022-01-25 PROCEDURE — 1159F PR MEDICATION LIST DOCUMENTED IN MEDICAL RECORD: ICD-10-PCS | Mod: CPTII,S$GLB,, | Performed by: ORTHOPAEDIC SURGERY

## 2022-01-25 PROCEDURE — 3074F PR MOST RECENT SYSTOLIC BLOOD PRESSURE < 130 MM HG: ICD-10-PCS | Mod: CPTII,S$GLB,, | Performed by: ORTHOPAEDIC SURGERY

## 2022-01-25 PROCEDURE — 99213 OFFICE O/P EST LOW 20 MIN: CPT | Mod: S$GLB,,, | Performed by: ORTHOPAEDIC SURGERY

## 2022-01-25 RX ORDER — MELOXICAM 15 MG/1
15 TABLET ORAL DAILY
Qty: 30 TABLET | Refills: 1 | Status: SHIPPED | OUTPATIENT
Start: 2022-01-25

## 2022-01-25 NOTE — TELEPHONE ENCOUNTER
Called and spoke with patient spouse to let her know that we see that he is already a patient of Dr. Romero and we prefer for him to keep seeing the same provider.  Patient spouse understand and will contact their office.

## 2022-01-25 NOTE — PROGRESS NOTES
"Right hamstring pain that started yesterday at work - did not feel a pull, does not recall an injury, did not do anything unusual, no sensation of pulling a muscle  Worse with going and up and down stairs   No numbness or tingling that he can recall   This is not the knee pain he has complained about in the past     Carpal tunnel symptoms have returned on the right     Vitals:    01/25/22 1434   BP: 124/78   Pulse: 93   Resp: 18   SpO2: 97%   Weight: 92.9 kg (204 lb 12.9 oz)   Height: 5' 7" (1.702 m)   PainSc:   8   PainLoc: Hand     Right upper extremity  Localizes numbness and tingling to the median nerve distribution  No atrophy  Negative Tinel's, positive median nerve compression test  LTSI m/u/r  2+ RP  + EPL, IO, FDS, FDP      Right lower extremity:   Diffusely tender over the hamstrings from the buttock to the knee.  No knee effusion, nontender over the medial joint line, lateral joint line  Negative straight leg raise  Ltsi s/s/sp/dp/t  + ehl/fhl/ta/gs  2+ DP    Assessment and plan:  36-year-old male with right hamstring strain, right carpal tunnel syndrome  EMG to assess carpal tunnel syndrome  For HS:   Ice x 72 then heat   Gentle stretching afte 72 hours   Will keep out of work this week, plan for return Monday   sue   Will have him return to clinic [facility] to discuss EMG results. Can consider repeat injection vs referral for CTR  "

## 2022-01-26 ENCOUNTER — PATIENT MESSAGE (OUTPATIENT)
Dept: FAMILY MEDICINE | Facility: CLINIC | Age: 37
End: 2022-01-26
Payer: COMMERCIAL

## 2022-02-14 ENCOUNTER — OFFICE VISIT (OUTPATIENT)
Dept: ORTHOPEDICS | Facility: CLINIC | Age: 37
End: 2022-02-14
Payer: COMMERCIAL

## 2022-02-14 VITALS — BODY MASS INDEX: 31.83 KG/M2 | WEIGHT: 202.81 LBS | HEIGHT: 67 IN

## 2022-02-14 DIAGNOSIS — G56.01 CARPAL TUNNEL SYNDROME ON RIGHT: ICD-10-CM

## 2022-02-14 DIAGNOSIS — G56.02 CARPAL TUNNEL SYNDROME OF LEFT WRIST: Primary | ICD-10-CM

## 2022-02-14 PROCEDURE — 99999 PR PBB SHADOW E&M-EST. PATIENT-LVL III: CPT | Mod: PBBFAC,,, | Performed by: PHYSICIAN ASSISTANT

## 2022-02-14 PROCEDURE — 1160F PR REVIEW ALL MEDS BY PRESCRIBER/CLIN PHARMACIST DOCUMENTED: ICD-10-PCS | Mod: CPTII,S$GLB,, | Performed by: PHYSICIAN ASSISTANT

## 2022-02-14 PROCEDURE — 1160F RVW MEDS BY RX/DR IN RCRD: CPT | Mod: CPTII,S$GLB,, | Performed by: PHYSICIAN ASSISTANT

## 2022-02-14 PROCEDURE — 99499 UNLISTED E&M SERVICE: CPT | Mod: S$GLB,,, | Performed by: PHYSICIAN ASSISTANT

## 2022-02-14 PROCEDURE — 1159F PR MEDICATION LIST DOCUMENTED IN MEDICAL RECORD: ICD-10-PCS | Mod: CPTII,S$GLB,, | Performed by: PHYSICIAN ASSISTANT

## 2022-02-14 PROCEDURE — 3008F BODY MASS INDEX DOCD: CPT | Mod: CPTII,S$GLB,, | Performed by: PHYSICIAN ASSISTANT

## 2022-02-14 PROCEDURE — 99499 NO LOS: ICD-10-PCS | Mod: S$GLB,,, | Performed by: PHYSICIAN ASSISTANT

## 2022-02-14 PROCEDURE — 3008F PR BODY MASS INDEX (BMI) DOCUMENTED: ICD-10-PCS | Mod: CPTII,S$GLB,, | Performed by: PHYSICIAN ASSISTANT

## 2022-02-14 PROCEDURE — 1159F MED LIST DOCD IN RCRD: CPT | Mod: CPTII,S$GLB,, | Performed by: PHYSICIAN ASSISTANT

## 2022-02-14 PROCEDURE — 99999 PR PBB SHADOW E&M-EST. PATIENT-LVL III: ICD-10-PCS | Mod: PBBFAC,,, | Performed by: PHYSICIAN ASSISTANT

## 2022-02-16 ENCOUNTER — OFFICE VISIT (OUTPATIENT)
Dept: SPORTS MEDICINE | Facility: CLINIC | Age: 37
End: 2022-02-16
Payer: COMMERCIAL

## 2022-02-16 ENCOUNTER — TELEPHONE (OUTPATIENT)
Dept: FAMILY MEDICINE | Facility: CLINIC | Age: 37
End: 2022-02-16
Payer: COMMERCIAL

## 2022-02-16 VITALS — BODY MASS INDEX: 31.71 KG/M2 | HEIGHT: 67 IN | TEMPERATURE: 98 F | WEIGHT: 202 LBS

## 2022-02-16 DIAGNOSIS — M25.561 MECHANICAL KNEE PAIN, RIGHT: Primary | ICD-10-CM

## 2022-02-16 DIAGNOSIS — M25.561 ACUTE PAIN OF RIGHT KNEE: ICD-10-CM

## 2022-02-16 DIAGNOSIS — R26.89 ANTALGIC GAIT: ICD-10-CM

## 2022-02-16 DIAGNOSIS — Z98.890 STATUS POST RECONSTRUCTION OF ANTERIOR CRUCIATE LIGAMENT: ICD-10-CM

## 2022-02-16 PROCEDURE — 99204 OFFICE O/P NEW MOD 45 MIN: CPT | Mod: S$GLB,,, | Performed by: FAMILY MEDICINE

## 2022-02-16 PROCEDURE — 99999 PR PBB SHADOW E&M-EST. PATIENT-LVL III: ICD-10-PCS | Mod: PBBFAC,,, | Performed by: FAMILY MEDICINE

## 2022-02-16 PROCEDURE — 99204 PR OFFICE/OUTPT VISIT, NEW, LEVL IV, 45-59 MIN: ICD-10-PCS | Mod: S$GLB,,, | Performed by: FAMILY MEDICINE

## 2022-02-16 PROCEDURE — 3008F BODY MASS INDEX DOCD: CPT | Mod: CPTII,S$GLB,, | Performed by: FAMILY MEDICINE

## 2022-02-16 PROCEDURE — 1160F PR REVIEW ALL MEDS BY PRESCRIBER/CLIN PHARMACIST DOCUMENTED: ICD-10-PCS | Mod: CPTII,S$GLB,, | Performed by: FAMILY MEDICINE

## 2022-02-16 PROCEDURE — 3008F PR BODY MASS INDEX (BMI) DOCUMENTED: ICD-10-PCS | Mod: CPTII,S$GLB,, | Performed by: FAMILY MEDICINE

## 2022-02-16 PROCEDURE — 1159F MED LIST DOCD IN RCRD: CPT | Mod: CPTII,S$GLB,, | Performed by: FAMILY MEDICINE

## 2022-02-16 PROCEDURE — 1160F RVW MEDS BY RX/DR IN RCRD: CPT | Mod: CPTII,S$GLB,, | Performed by: FAMILY MEDICINE

## 2022-02-16 PROCEDURE — 1159F PR MEDICATION LIST DOCUMENTED IN MEDICAL RECORD: ICD-10-PCS | Mod: CPTII,S$GLB,, | Performed by: FAMILY MEDICINE

## 2022-02-16 PROCEDURE — 99999 PR PBB SHADOW E&M-EST. PATIENT-LVL III: CPT | Mod: PBBFAC,,, | Performed by: FAMILY MEDICINE

## 2022-02-16 NOTE — PROGRESS NOTES
Solomon Busby Jr., a 36 y.o. male, presents today for evaluation of his right knee.      History of Present Illness (HPI)  Location: anterior knee   Onset: chronic, insidious  Palliative:    relative rest   oral analgesics, OTC  Provocative: prolonged ambulation   Walking, running   COld  Prior: none  Progression: plateau discomfort  Quality: sharp  Radiation: none  Severity: per nursing documentation  Timing: intermittent w/ use  Trauma: none    Review of Systems (ROS)  A 10+ review of systems was performed with pertinent positives and negatives noted above in the history of present illness. Other systems were negative unless otherwise specified.    Physical Examination (PE)  General:  The patient is alert and oriented x 3. Mood is pleasant. Observation of ears, eyes and nose reveal no gross abnormalities. HEENT: NCAT, sclera anicteric.   Lungs: Respirations are equal and unlabored.  Gait is coordinated. Patient can toe walk and heel walk without difficulty.    KNEE EXAMINATION    Observation/Inspection  Gait:   Nonantalgic   Alignment:  Neutral   Scars:   None   Muscle atrophy: Mild  Effusion:  None   Warmth:  None   Discoloration:   none     Tenderness / Crepitus (T / C):         T / C      T / C  Patella   - / -   Lateral joint line   - / -     Peripatellar medial  -  Medial joint line    + / -  Peripatellar lateral -  Medial plica   - / -  Patellar tendon -   Popliteal fossa   - / -  Quad tendon   -   Gastrocnemius   -  Prepatellar Bursa - / -   Quadricep   -  Tibial tubercle  -  Thigh/hamstring  -  Pes anserine/HS -  Fibula    -  ITB   - / -  Tibia     -  Tib/fib joint  - / -  LCL    -    MFC   - / -   MCL: Proximal  -    LFC   - / -   Distal    -          ROM: (* = pain)  PASSIVE   ACTIVE    Left :   5 / 0 / 145   5 / 0 / 145     Right :    5 / 0 / 145   5 / 0 / 145    Patellofemoral examination:  See above noted areas of tenderness.   Patella position    Subluxation / dislocation: Centered        Sup. /  Inf;   Normal   Crepitus (PF):    Absent   Patellar Mobility:       Medial-lateral:   Normal    Superior-inferior:  Normal    Inferior tilt   Normal    Patellar tendon:  Normal   Lateral tilt:    Normal   J-sign:     None   Patellofemoral grind:   No pain     Meniscal Signs:     Pain on terminal extension:  +  Pain on terminal flexion:  +  Ericks maneuver:  +*  Squat     NT  Thesaly    NT    Ligament Examination:  ACL / Lachman:  WNL  PCL-Post.  drawer: normal 0 to 2mm  MCL- Valgus:  normal 0 to 2mm  LCL- Varus:    normal 0 to 2mm  Pivot shift:  guarding   Dial Test:   difference c/w other side   At 30° flexion: normal (< 5°)    At 90° flexion: normal (< 5°)   Reverse Pivot Shift:   normal (Equal)     Strength: (* = with pain) Painful Side  Quadriceps   5/5  Hamstrin/5    Extremity Neuro-vascular Examination:   Sensation:  Grossly intact to light touch all dermatomal regions.   Motor Function:  Fully intact motor function at hip, knee, foot and ankle    DTRs;  quadriceps and  achilles 2+.  No clonus and downgoing Babinski.    Vascular status:  DP and PT pulses 2+, brisk capillary refill, symmetric.     Other Findings:    ASSESSMENT & PLAN  Assessment  #1 mechanical knee pain, right   s/p ACL recon - quad tendon,  LLiuzza    No evidence of neurologic pathology  No evidence of vascular pathology    Imaging studies reviewed:   X-ray knee, right 21.12    Plan  Given extreme dysfunction and discomfort, coupled with physical examination suggesting meniscal pathology and non-diagnostic x-ray imaging, we will obtain MRI imaging for further evaluation of the soft tissue structures of the knee.     Given extreme dysfunction and discomfort, coupled with physical examination suggesting meniscal pathology and non-diagnostic x-ray imaging, we will obtain CT imaging for further evaluation of the soft tissue structures of the knee.    We discussed options including    Watchful waiting / relative rest x   Physical  therapy    Injection therapy    Consultation    The patient chooses As above   x = prescribed  CSI = corticosteroid injection  VSI = viscosupplement injection  PRPI = platelet rich plasma injection  ia = intra articular  R = right  L = left  B = bilateral   nfSx = surgical consultation was recommended, but patient is not interested in consultation at this time    Physical Therapy        Formal (fPT), @ [facility] facility    Formal (fPT), @ [facility] facility        Homegoing (hgPT), per concurrent fPT recommendations    Homegoing (hgPT), per prior fPT recommendations    Homegoing (hgPT), handout provided        w/  (atPT)    [blank] = not prescribed  x = prescribed  b = prescribed, and begin as indicated  t = continue as indicated  r = prescribed, and restart as indicated  p = completed prior as indicated  hs = prescribed, and with high school   col = prescribed, and with college or university   nfPT = physical therapy was recommended, but patient is not interested in PT at this time    Activity (e.g. sports, work) restrictions    [blank] = as tolerated  pt = per physical therapist  at = per   NWB = non weight bearing on affected lower extremity, with crutches assistance for ambulation    Bracing    [blank] = not prescribed  r = recommended, but not fit with at todays visit  f = prescribed and fit with at todays visit  t = continue as indicated  d = d/c  p = as needed  rare = use on rare, as-needed basis; advised against chronic use    Pain management m from another provider   [blank] = No prescription necessary. A handout detailing dosing of appropriate   over-the-counter musculoskeletal analgesics was made available to the patient.   m = meloxicam x 14 days  mp = 14 day course of meloxicam prescribed prior    Follow up Mri+ct   [blank] = as needed  [number] = in [number] weeks  CSI = for corticosteroid injection  VSI = for viscosupplement injection or  injection series  PRP = for platelet rich plasma injection or injection series  MRI = after MRI imaging  ns = should surgical options be deferred (no surgery)  o = appointment offered, deferred by patient    Should symptoms worsen or fail to resolve, consider    Revisiting the above options and / or      Vocation:   [place]

## 2022-02-16 NOTE — PROGRESS NOTES
"Patient ID: Solomon Busby Jr. is a 36 y.o. male.    Chief Complaint:     HISTORY:  Solomon Busby Jr. is a 36 y.o. male who returns to me today for follow up of bilateral hand numbness and tingling.  He was last seen by me 9/7/2021.  Today he is doing well- came for injections for CTS.  He is scheduled for EMG in a few weeks.    Continues to have knee pain- advised he should follow with Dr. Marina for this or seek second opinion from surgeon.  He did not have much relief with previous injection.      PMH/PSH/FamHx/SocHx:    Unchanged from prior visit.    ROS:  Constitution: Negative for chills, fever and weakness.   Respiratory: Negative for cough and shortness of breath.   Musculoskeletal: Positive for left knee pain  Psychiatric/Behavioral: The patient is not nervous/anxious.       PHYSICAL EXAM:   Ht 5' 7" (1.702 m)   Wt 92 kg (202 lb 13.2 oz)   BMI 31.77 kg/m²       ASSESSMENT/PLAN:    Diagnoses and all orders for this visit:    Carpal tunnel syndrome of left wrist    Carpal tunnel syndrome on right    - Recommend avoid repeat CTS injection prior to EMGS- he will proceed with these and I will call him to review results    "

## 2022-02-17 ENCOUNTER — HOSPITAL ENCOUNTER (OUTPATIENT)
Dept: RADIOLOGY | Facility: HOSPITAL | Age: 37
Discharge: HOME | End: 2022-02-17
Attending: FAMILY MEDICINE
Payer: COMMERCIAL

## 2022-02-17 DIAGNOSIS — M25.561 MECHANICAL KNEE PAIN, RIGHT: ICD-10-CM

## 2022-02-17 DIAGNOSIS — M25.561 ACUTE PAIN OF RIGHT KNEE: ICD-10-CM

## 2022-02-17 DIAGNOSIS — Z98.890 STATUS POST RECONSTRUCTION OF ANTERIOR CRUCIATE LIGAMENT: ICD-10-CM

## 2022-02-17 PROCEDURE — 73700 CT LOWER EXTREMITY W/O DYE: CPT | Mod: 26,RT,, | Performed by: RADIOLOGY

## 2022-02-17 PROCEDURE — 73721 MRI KNEE WITHOUT CONTRAST RIGHT: ICD-10-PCS | Mod: 26,RT,, | Performed by: RADIOLOGY

## 2022-02-17 PROCEDURE — 73700 CT LOWER EXTREMITY W/O DYE: CPT | Mod: TC,RT

## 2022-02-17 PROCEDURE — 73721 MRI JNT OF LWR EXTRE W/O DYE: CPT | Mod: TC,RT

## 2022-02-17 PROCEDURE — 73721 MRI JNT OF LWR EXTRE W/O DYE: CPT | Mod: 26,RT,, | Performed by: RADIOLOGY

## 2022-02-17 PROCEDURE — 73700 CT KNEE WITHOUT CONTRAST RIGHT: ICD-10-PCS | Mod: 26,RT,, | Performed by: RADIOLOGY

## 2022-02-22 ENCOUNTER — OFFICE VISIT (OUTPATIENT)
Dept: SPORTS MEDICINE | Facility: CLINIC | Age: 37
End: 2022-02-22
Payer: COMMERCIAL

## 2022-02-22 VITALS — HEIGHT: 67 IN | WEIGHT: 205 LBS | TEMPERATURE: 98 F | BODY MASS INDEX: 32.18 KG/M2

## 2022-02-22 DIAGNOSIS — Z98.890 STATUS POST RECONSTRUCTION OF ANTERIOR CRUCIATE LIGAMENT: ICD-10-CM

## 2022-02-22 DIAGNOSIS — M23.203 DEGENERATIVE TEAR OF MEDIAL MENISCUS OF RIGHT KNEE: ICD-10-CM

## 2022-02-22 DIAGNOSIS — R26.89 ANTALGIC GAIT: ICD-10-CM

## 2022-02-22 DIAGNOSIS — M25.561 CHRONIC PAIN OF RIGHT KNEE: Primary | ICD-10-CM

## 2022-02-22 DIAGNOSIS — G89.29 CHRONIC PAIN OF RIGHT KNEE: Primary | ICD-10-CM

## 2022-02-22 PROCEDURE — 99999 PR PBB SHADOW E&M-EST. PATIENT-LVL IV: ICD-10-PCS | Mod: PBBFAC,,, | Performed by: FAMILY MEDICINE

## 2022-02-22 PROCEDURE — 1160F RVW MEDS BY RX/DR IN RCRD: CPT | Mod: CPTII,S$GLB,, | Performed by: FAMILY MEDICINE

## 2022-02-22 PROCEDURE — 3008F PR BODY MASS INDEX (BMI) DOCUMENTED: ICD-10-PCS | Mod: CPTII,S$GLB,, | Performed by: FAMILY MEDICINE

## 2022-02-22 PROCEDURE — 1160F PR REVIEW ALL MEDS BY PRESCRIBER/CLIN PHARMACIST DOCUMENTED: ICD-10-PCS | Mod: CPTII,S$GLB,, | Performed by: FAMILY MEDICINE

## 2022-02-22 PROCEDURE — 99214 OFFICE O/P EST MOD 30 MIN: CPT | Mod: 25,S$GLB,, | Performed by: FAMILY MEDICINE

## 2022-02-22 PROCEDURE — 20611 DRAIN/INJ JOINT/BURSA W/US: CPT | Mod: RT,S$GLB,, | Performed by: FAMILY MEDICINE

## 2022-02-22 PROCEDURE — 99214 PR OFFICE/OUTPT VISIT, EST, LEVL IV, 30-39 MIN: ICD-10-PCS | Mod: 25,S$GLB,, | Performed by: FAMILY MEDICINE

## 2022-02-22 PROCEDURE — 99999 PR PBB SHADOW E&M-EST. PATIENT-LVL IV: CPT | Mod: PBBFAC,,, | Performed by: FAMILY MEDICINE

## 2022-02-22 PROCEDURE — 1159F PR MEDICATION LIST DOCUMENTED IN MEDICAL RECORD: ICD-10-PCS | Mod: CPTII,S$GLB,, | Performed by: FAMILY MEDICINE

## 2022-02-22 PROCEDURE — 1159F MED LIST DOCD IN RCRD: CPT | Mod: CPTII,S$GLB,, | Performed by: FAMILY MEDICINE

## 2022-02-22 PROCEDURE — 20611 LARGE JOINT ASPIRATION/INJECTION: R KNEE: ICD-10-PCS | Mod: RT,S$GLB,, | Performed by: FAMILY MEDICINE

## 2022-02-22 PROCEDURE — 3008F BODY MASS INDEX DOCD: CPT | Mod: CPTII,S$GLB,, | Performed by: FAMILY MEDICINE

## 2022-02-22 RX ORDER — TRIAMCINOLONE ACETONIDE 40 MG/ML
40 INJECTION, SUSPENSION INTRA-ARTICULAR; INTRAMUSCULAR
Status: DISCONTINUED | OUTPATIENT
Start: 2022-02-22 | End: 2022-02-22 | Stop reason: HOSPADM

## 2022-02-22 RX ADMIN — TRIAMCINOLONE ACETONIDE 40 MG: 40 INJECTION, SUSPENSION INTRA-ARTICULAR; INTRAMUSCULAR at 10:02

## 2022-02-22 NOTE — PROGRESS NOTES
Solomon Busby Jr., a 36 y.o. male, presents today for evaluation of his right knee.      History of Present Illness (HPI)  02/22/2022 pt here today for review of MRI and CT scan of right knee  Location: anterior knee   Onset: chronic, insidious  Palliative:    relative rest   oral analgesics, OTC  Provocative: prolonged ambulation   Walking, running   COld  Prior: none  Progression: plateau discomfort  Quality: sharp  Radiation: none  Severity: per nursing documentation  Timing: intermittent w/ use  Trauma: none    Review of Systems (ROS)  A 10+ review of systems was performed with pertinent positives and negatives noted above in the history of present illness. Other systems were negative unless otherwise specified.    Physical Examination (PE)  General:  The patient is alert and oriented x 3. Mood is pleasant. Observation of ears, eyes and nose reveal no gross abnormalities. HEENT: NCAT, sclera anicteric.   Lungs: Respirations are equal and unlabored.  Gait is coordinated. Patient can toe walk and heel walk without difficulty.    KNEE EXAMINATION    Observation/Inspection  Gait:   Nonantalgic   Alignment:  Neutral   Scars:   None   Muscle atrophy: Mild  Effusion:  None   Warmth:  None   Discoloration:   none     Tenderness / Crepitus (T / C):         T / C      T / C  Patella   - / -   Lateral joint line   - / -     Peripatellar medial  -  Medial joint line    + / -  Peripatellar lateral -  Medial plica   - / -  Patellar tendon -   Popliteal fossa   - / -  Quad tendon   -   Gastrocnemius   -  Prepatellar Bursa - / -   Quadricep   -  Tibial tubercle  -  Thigh/hamstring  -  Pes anserine/HS -  Fibula    -  ITB   - / -  Tibia     -  Tib/fib joint  - / -  LCL    -    MFC   - / -   MCL: Proximal  -    LFC   - / -   Distal    -          ROM: (* = pain)  PASSIVE   ACTIVE    Left :   5 / 0 / 145   5 / 0 / 145     Right :    5 / 0 / 145   5 / 0 / 145    Patellofemoral examination:  See above noted areas of tenderness.    Patella position    Subluxation / dislocation: Centered        Sup. / Inf;   Normal   Crepitus (PF):    Absent   Patellar Mobility:       Medial-lateral:   Normal    Superior-inferior:  Normal    Inferior tilt   Normal    Patellar tendon:  Normal   Lateral tilt:    Normal   J-sign:     None   Patellofemoral grind:   No pain     Meniscal Signs:     Pain on terminal extension:  +  Pain on terminal flexion:  +  Ericks maneuver:  +*  Squat     NT  Thesaly    NT    Ligament Examination:  ACL / Lachman:  WNL  PCL-Post.  drawer: normal 0 to 2mm  MCL- Valgus:  normal 0 to 2mm  LCL- Varus:    normal 0 to 2mm  Pivot shift:  guarding   Dial Test:   difference c/w other side   At 30° flexion: normal (< 5°)    At 90° flexion: normal (< 5°)   Reverse Pivot Shift:   normal (Equal)     Strength: (* = with pain) Painful Side  Quadriceps   5/5  Hamstrin/5    Extremity Neuro-vascular Examination:   Sensation:  Grossly intact to light touch all dermatomal regions.   Motor Function:  Fully intact motor function at hip, knee, foot and ankle    DTRs;  quadriceps and  achilles 2+.  No clonus and downgoing Babinski.    Vascular status:  DP and PT pulses 2+, brisk capillary refill, symmetric.     Other Findings:    ASSESSMENT & PLAN  Assessment  #1 mechanical knee pain, right   W/ med men tearing   ACL graft intact   s/p ACL recon - quad tendon,  LLiuzza    No evidence of neurologic pathology  No evidence of vascular pathology    Imaging studies reviewed:   X-ray knee, right 21.12  MRI knee, right 22.02  CT knee, right 22.02    Plan    We discussed options including    Watchful waiting / relative rest    Physical therapy x   Injection therapy CSI iaknee r   Consultation Discussed, deferred by pt   The patient chooses As above   x = prescribed  CSI = corticosteroid injection  VSI = viscosupplement injection  PRPI = platelet rich plasma injection  ia = intra articular  R = right  L = left  B = bilateral   nfSx = surgical  consultation was recommended, but patient is not interested in consultation at this time    Physical Therapy        Formal (fPT), @ [facility] facility    Formal (fPT), @ [facility] facility b       Homegoing (hgPT), per concurrent fPT recommendations    Homegoing (hgPT), per prior fPT recommendations    Homegoing (hgPT), handout provided        w/  (atPT)    [blank] = not prescribed  x = prescribed  b = prescribed, and begin as indicated  t = continue as indicated  r = prescribed, and restart as indicated  p = completed prior as indicated  hs = prescribed, and with high school   col = prescribed, and with college or university   nfPT = physical therapy was recommended, but patient is not interested in PT at this time    Activity (e.g. sports, work) restrictions    [blank] = as tolerated  pt = per physical therapist  at = per   NWB = non weight bearing on affected lower extremity, with crutches assistance for ambulation    Bracing    [blank] = not prescribed  r = recommended, but not fit with at todays visit  f = prescribed and fit with at todays visit  t = continue as indicated  d = d/c  p = as needed  rare = use on rare, as-needed basis; advised against chronic use    Pain management m from another provider   [blank] = No prescription necessary. A handout detailing dosing of appropriate   over-the-counter musculoskeletal analgesics was made available to the patient.   m = meloxicam x 14 days  mp = 14 day course of meloxicam prescribed prior    Follow up  6-8   [blank] = as needed  [number] = in [number] weeks  CSI = for corticosteroid injection  VSI = for viscosupplement injection or injection series  PRP = for platelet rich plasma injection or injection series  MRI = after MRI imaging  ns = should surgical options be deferred (no surgery)  o = appointment offered, deferred by patient    Should symptoms worsen or fail to resolve, consider    Revisiting the  above options and / or vsi vs surg consult      Vocation:   [place]

## 2022-02-22 NOTE — PROCEDURES
"Large Joint Aspiration/Injection: R knee    Date/Time: 2/22/2022 10:00 AM  Performed by: Boogie Tan MD  Authorized by: Boogie Tan MD     Consent Done?:  Yes (Verbal)  Indications:  Pain  Site marked: the procedure site was marked    Timeout: prior to procedure the correct patient, procedure, and site was verified    Prep: patient was prepped and draped in usual sterile fashion      Details:  Needle Size:  20 G  Ultrasonic Guidance for needle placement?: Yes    Images are saved and documented.  Approach:  Lateral  Location:  Knee  Site:  R knee  Medications:  40 mg triamcinolone acetonide 40 mg/mL  Patient tolerance:  Patient tolerated the procedure well with no immediate complications     Description of ultrasound utilization for needle guidance:   Ultrasound guidance used for needle localization. Images saved and stored for documentation. The SUPRAPATELLAR BURSA / KNEE JOINT was visualized. Dynamic visualization of the 20g x 3.5" needle was continuous throughout the procedure.       "

## 2022-03-04 ENCOUNTER — PATIENT MESSAGE (OUTPATIENT)
Dept: SPORTS MEDICINE | Facility: CLINIC | Age: 37
End: 2022-03-04
Payer: COMMERCIAL

## 2022-03-05 ENCOUNTER — HOSPITAL ENCOUNTER (EMERGENCY)
Facility: HOSPITAL | Age: 37
Discharge: HOME | End: 2022-03-05
Attending: EMERGENCY MEDICINE
Payer: COMMERCIAL

## 2022-03-05 VITALS
WEIGHT: 185 LBS | DIASTOLIC BLOOD PRESSURE: 91 MMHG | BODY MASS INDEX: 29.73 KG/M2 | HEIGHT: 66 IN | SYSTOLIC BLOOD PRESSURE: 150 MMHG | TEMPERATURE: 99 F | RESPIRATION RATE: 18 BRPM | OXYGEN SATURATION: 100 % | HEART RATE: 80 BPM
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
## 2022-03-05 DIAGNOSIS — T07.XXXA MULTIPLE PUNCTURE WOUNDS: Primary | ICD-10-CM

## 2022-03-05 PROCEDURE — 99284 EMERGENCY DEPT VISIT MOD MDM: CPT | Mod: ,,, | Performed by: EMERGENCY MEDICINE

## 2022-03-05 PROCEDURE — 99283 EMERGENCY DEPT VISIT LOW MDM: CPT

## 2022-03-05 PROCEDURE — 99284 PR EMERGENCY DEPT VISIT,LEVEL IV: ICD-10-PCS | Mod: ,,, | Performed by: EMERGENCY MEDICINE
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
## 2022-03-05 NOTE — ED NOTES
Current Status      Updated on: 9/13/2020 12:46 PM    Name Date Status Dose VIS Date Route Site  Lot# Given By Verified By   TDAP 7/9/2020 Given -- -- IM RIGHT ARM [employer]  -- --   Exp. Date NDC # Product Time Location External Comment
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
## 2022-03-06 NOTE — ED NOTES
Pt wound dressed by RN. Denies pain, denies difficulty ambulating. DC instructions given to pt. Denies questions.

## 2022-03-07 ENCOUNTER — TELEPHONE (OUTPATIENT)
Dept: SPORTS MEDICINE | Facility: CLINIC | Age: 37
End: 2022-03-07
Payer: COMMERCIAL

## 2022-03-07 ENCOUNTER — TELEPHONE (OUTPATIENT)
Dept: FAMILY MEDICINE | Facility: CLINIC | Age: 37
End: 2022-03-07
Payer: COMMERCIAL
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
## 2022-03-07 NOTE — TELEPHONE ENCOUNTER
Spoke with patient very adamant about being seen today. Patient scheduled to see Dr. Dc today at 10:20 qm today. Patient voiced understanding at this time.
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
## 2022-03-08 ENCOUNTER — OFFICE VISIT (OUTPATIENT)
Dept: FAMILY MEDICINE | Facility: CLINIC | Age: 37
End: 2022-03-08
Payer: COMMERCIAL

## 2022-03-08 ENCOUNTER — PROCEDURE VISIT (OUTPATIENT)
Dept: NEUROLOGY | Facility: CLINIC | Age: 37
End: 2022-03-08
Payer: COMMERCIAL

## 2022-03-08 VITALS — HEIGHT: 66 IN | WEIGHT: 184.94 LBS | BODY MASS INDEX: 29.72 KG/M2

## 2022-03-08 DIAGNOSIS — S81.031D: Primary | ICD-10-CM

## 2022-03-08 DIAGNOSIS — G56.02 CARPAL TUNNEL SYNDROME OF LEFT WRIST: ICD-10-CM

## 2022-03-08 DIAGNOSIS — G56.01 CARPAL TUNNEL SYNDROME ON RIGHT: ICD-10-CM

## 2022-03-08 PROCEDURE — 95911 NRV CNDJ TEST 9-10 STUDIES: CPT | Mod: S$GLB,,, | Performed by: NEUROLOGICAL SURGERY

## 2022-03-08 PROCEDURE — 1160F RVW MEDS BY RX/DR IN RCRD: CPT | Mod: CPTII,95,, | Performed by: FAMILY MEDICINE

## 2022-03-08 PROCEDURE — 1159F MED LIST DOCD IN RCRD: CPT | Mod: CPTII,95,, | Performed by: FAMILY MEDICINE

## 2022-03-08 PROCEDURE — 99213 PR OFFICE/OUTPT VISIT, EST, LEVL III, 20-29 MIN: ICD-10-PCS | Mod: 95,,, | Performed by: FAMILY MEDICINE

## 2022-03-08 PROCEDURE — 95911 PR NERVE CONDUCTION STUDY; 9-10 STUDIES: ICD-10-PCS | Mod: S$GLB,,, | Performed by: NEUROLOGICAL SURGERY

## 2022-03-08 PROCEDURE — 1159F PR MEDICATION LIST DOCUMENTED IN MEDICAL RECORD: ICD-10-PCS | Mod: CPTII,95,, | Performed by: FAMILY MEDICINE

## 2022-03-08 PROCEDURE — 95886 PR EMG COMPLETE, W/ NERVE CONDUCTION STUDIES, 5+ MUSCLES: ICD-10-PCS | Mod: S$GLB,,, | Performed by: NEUROLOGICAL SURGERY

## 2022-03-08 PROCEDURE — 99213 OFFICE O/P EST LOW 20 MIN: CPT | Mod: 95,,, | Performed by: FAMILY MEDICINE

## 2022-03-08 PROCEDURE — 95886 MUSC TEST DONE W/N TEST COMP: CPT | Mod: S$GLB,,, | Performed by: NEUROLOGICAL SURGERY

## 2022-03-08 PROCEDURE — 1160F PR REVIEW ALL MEDS BY PRESCRIBER/CLIN PHARMACIST DOCUMENTED: ICD-10-PCS | Mod: CPTII,95,, | Performed by: FAMILY MEDICINE

## 2022-03-08 RX ORDER — NAPROXEN 500 MG/1
500 TABLET ORAL 2 TIMES DAILY WITH MEALS
Qty: 60 TABLET | Refills: 2 | Status: SHIPPED | OUTPATIENT
Start: 2022-03-08 | End: 2022-07-12

## 2022-03-08 RX ORDER — ACETAMINOPHEN AND CODEINE PHOSPHATE 300; 30 MG/1; MG/1
1 TABLET ORAL 2 TIMES DAILY PRN
Qty: 30 TABLET | Refills: 0 | Status: SHIPPED | OUTPATIENT
Start: 2022-03-08
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
## 2022-03-09 ENCOUNTER — OFFICE VISIT (OUTPATIENT)
Dept: SPORTS MEDICINE | Facility: CLINIC | Age: 37
End: 2022-03-09
Payer: COMMERCIAL

## 2022-03-09 ENCOUNTER — PATIENT MESSAGE (OUTPATIENT)
Dept: ORTHOPEDICS | Facility: CLINIC | Age: 37
End: 2022-03-09
Payer: COMMERCIAL

## 2022-03-09 VITALS — WEIGHT: 185 LBS | BODY MASS INDEX: 29.73 KG/M2 | HEIGHT: 66 IN | TEMPERATURE: 98 F

## 2022-03-09 DIAGNOSIS — M25.561 CHRONIC PAIN OF RIGHT KNEE: Primary | ICD-10-CM

## 2022-03-09 DIAGNOSIS — Z98.890 STATUS POST RECONSTRUCTION OF ANTERIOR CRUCIATE LIGAMENT: ICD-10-CM

## 2022-03-09 DIAGNOSIS — S81.832A GUNSHOT WOUND OF LEFT LOWER LEG, INITIAL ENCOUNTER: ICD-10-CM

## 2022-03-09 DIAGNOSIS — G89.29 CHRONIC PAIN OF RIGHT KNEE: Primary | ICD-10-CM

## 2022-03-09 DIAGNOSIS — M23.203 DEGENERATIVE TEAR OF MEDIAL MENISCUS OF RIGHT KNEE: ICD-10-CM

## 2022-03-09 PROCEDURE — 1160F RVW MEDS BY RX/DR IN RCRD: CPT | Mod: CPTII,S$GLB,, | Performed by: FAMILY MEDICINE

## 2022-03-09 PROCEDURE — 1159F PR MEDICATION LIST DOCUMENTED IN MEDICAL RECORD: ICD-10-PCS | Mod: CPTII,S$GLB,, | Performed by: FAMILY MEDICINE

## 2022-03-09 PROCEDURE — 3008F PR BODY MASS INDEX (BMI) DOCUMENTED: ICD-10-PCS | Mod: CPTII,S$GLB,, | Performed by: FAMILY MEDICINE

## 2022-03-09 PROCEDURE — 99999 PR PBB SHADOW E&M-EST. PATIENT-LVL III: CPT | Mod: PBBFAC,,, | Performed by: FAMILY MEDICINE

## 2022-03-09 PROCEDURE — 99214 OFFICE O/P EST MOD 30 MIN: CPT | Mod: S$GLB,,, | Performed by: FAMILY MEDICINE

## 2022-03-09 PROCEDURE — 1160F PR REVIEW ALL MEDS BY PRESCRIBER/CLIN PHARMACIST DOCUMENTED: ICD-10-PCS | Mod: CPTII,S$GLB,, | Performed by: FAMILY MEDICINE

## 2022-03-09 PROCEDURE — 1159F MED LIST DOCD IN RCRD: CPT | Mod: CPTII,S$GLB,, | Performed by: FAMILY MEDICINE

## 2022-03-09 PROCEDURE — 3008F BODY MASS INDEX DOCD: CPT | Mod: CPTII,S$GLB,, | Performed by: FAMILY MEDICINE

## 2022-03-09 PROCEDURE — 99214 PR OFFICE/OUTPT VISIT, EST, LEVL IV, 30-39 MIN: ICD-10-PCS | Mod: S$GLB,,, | Performed by: FAMILY MEDICINE

## 2022-03-09 PROCEDURE — 99999 PR PBB SHADOW E&M-EST. PATIENT-LVL III: ICD-10-PCS | Mod: PBBFAC,,, | Performed by: FAMILY MEDICINE
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
## 2022-03-10 ENCOUNTER — PATIENT MESSAGE (OUTPATIENT)
Dept: ORTHOPEDICS | Facility: CLINIC | Age: 37
End: 2022-03-10
Payer: COMMERCIAL

## 2022-03-16 DIAGNOSIS — Z11.59 NEED FOR HEPATITIS C SCREENING TEST: ICD-10-CM

## 2022-03-17 ENCOUNTER — OFFICE VISIT (OUTPATIENT)
Dept: FAMILY MEDICINE | Facility: CLINIC | Age: 37
End: 2022-03-17
Payer: COMMERCIAL

## 2022-03-17 VITALS
HEIGHT: 66 IN | BODY MASS INDEX: 32.13 KG/M2 | DIASTOLIC BLOOD PRESSURE: 80 MMHG | HEART RATE: 72 BPM | TEMPERATURE: 98 F | SYSTOLIC BLOOD PRESSURE: 106 MMHG | OXYGEN SATURATION: 97 % | WEIGHT: 199.94 LBS

## 2022-03-17 DIAGNOSIS — S81.031D GUNSHOT WOUND OF RIGHT KNEE, SUBSEQUENT ENCOUNTER: Primary | ICD-10-CM

## 2022-03-17 PROCEDURE — 99999 PR PBB SHADOW E&M-EST. PATIENT-LVL IV: CPT | Mod: PBBFAC,,, | Performed by: FAMILY MEDICINE

## 2022-03-17 PROCEDURE — 3079F PR MOST RECENT DIASTOLIC BLOOD PRESSURE 80-89 MM HG: ICD-10-PCS | Mod: CPTII,S$GLB,, | Performed by: FAMILY MEDICINE

## 2022-03-17 PROCEDURE — 3074F PR MOST RECENT SYSTOLIC BLOOD PRESSURE < 130 MM HG: ICD-10-PCS | Mod: CPTII,S$GLB,, | Performed by: FAMILY MEDICINE

## 2022-03-17 PROCEDURE — 1160F RVW MEDS BY RX/DR IN RCRD: CPT | Mod: CPTII,S$GLB,, | Performed by: FAMILY MEDICINE

## 2022-03-17 PROCEDURE — 99213 PR OFFICE/OUTPT VISIT, EST, LEVL III, 20-29 MIN: ICD-10-PCS | Mod: S$GLB,,, | Performed by: FAMILY MEDICINE

## 2022-03-17 PROCEDURE — 1159F PR MEDICATION LIST DOCUMENTED IN MEDICAL RECORD: ICD-10-PCS | Mod: CPTII,S$GLB,, | Performed by: FAMILY MEDICINE

## 2022-03-17 PROCEDURE — 3008F BODY MASS INDEX DOCD: CPT | Mod: CPTII,S$GLB,, | Performed by: FAMILY MEDICINE

## 2022-03-17 PROCEDURE — 99999 PR PBB SHADOW E&M-EST. PATIENT-LVL IV: ICD-10-PCS | Mod: PBBFAC,,, | Performed by: FAMILY MEDICINE

## 2022-03-17 PROCEDURE — 3074F SYST BP LT 130 MM HG: CPT | Mod: CPTII,S$GLB,, | Performed by: FAMILY MEDICINE

## 2022-03-17 PROCEDURE — 1159F MED LIST DOCD IN RCRD: CPT | Mod: CPTII,S$GLB,, | Performed by: FAMILY MEDICINE

## 2022-03-17 PROCEDURE — 1160F PR REVIEW ALL MEDS BY PRESCRIBER/CLIN PHARMACIST DOCUMENTED: ICD-10-PCS | Mod: CPTII,S$GLB,, | Performed by: FAMILY MEDICINE

## 2022-03-17 PROCEDURE — 3079F DIAST BP 80-89 MM HG: CPT | Mod: CPTII,S$GLB,, | Performed by: FAMILY MEDICINE

## 2022-03-17 PROCEDURE — 3008F PR BODY MASS INDEX (BMI) DOCUMENTED: ICD-10-PCS | Mod: CPTII,S$GLB,, | Performed by: FAMILY MEDICINE

## 2022-03-17 PROCEDURE — 99213 OFFICE O/P EST LOW 20 MIN: CPT | Mod: S$GLB,,, | Performed by: FAMILY MEDICINE

## 2022-03-17 RX ORDER — MUPIROCIN 20 MG/G
OINTMENT TOPICAL 3 TIMES DAILY
Qty: 30 G | Refills: 1 | Status: SHIPPED | OUTPATIENT
Start: 2022-03-17
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
## 2022-03-29 ENCOUNTER — PATIENT MESSAGE (OUTPATIENT)
Dept: ORTHOPEDICS | Facility: CLINIC | Age: 37
End: 2022-03-29
Payer: COMMERCIAL

## 2022-03-31 ENCOUNTER — PATIENT MESSAGE (OUTPATIENT)
Dept: ORTHOPEDICS | Facility: CLINIC | Age: 37
End: 2022-03-31
Payer: COMMERCIAL

## 2022-04-13 ENCOUNTER — PATIENT MESSAGE (OUTPATIENT)
Dept: SPORTS MEDICINE | Facility: CLINIC | Age: 37
End: 2022-04-13
Payer: COMMERCIAL

## 2022-04-18 ENCOUNTER — TELEPHONE (OUTPATIENT)
Dept: SPORTS MEDICINE | Facility: CLINIC | Age: 37
End: 2022-04-18
Payer: COMMERCIAL

## 2022-04-18 NOTE — TELEPHONE ENCOUNTER
----- Message from Romain Torres MA sent at 4/14/2022  9:38 AM CDT -----  Regarding: FW: ACL revision  Poss. revision ACL    ----- Message -----  From: Krystyna Baca  Sent: 4/14/2022   9:08 AM CDT  To: Romain Torres MA  Subject: ACL revision                                     Romain,   This is the patient we were just talking about. Really appreciate your help with this.     Thanks,   Krystyna

## 2022-04-18 NOTE — TELEPHONE ENCOUNTER
Spoke with patient to schedule appointment with Dr. Kiser for evaluation of knee. Referral from Dr. Tan. Scheduled for next Thursday at 9:45AM. Patient verbalized understanding.

## 2022-04-22 ENCOUNTER — OFFICE VISIT (OUTPATIENT)
Dept: FAMILY MEDICINE | Facility: CLINIC | Age: 37
End: 2022-04-22
Payer: COMMERCIAL

## 2022-04-22 DIAGNOSIS — S39.012A STRAIN OF LUMBAR PARASPINOUS MUSCLE, INITIAL ENCOUNTER: Primary | ICD-10-CM

## 2022-04-22 PROCEDURE — 1159F MED LIST DOCD IN RCRD: CPT | Mod: CPTII,95,, | Performed by: FAMILY MEDICINE

## 2022-04-22 PROCEDURE — 99213 OFFICE O/P EST LOW 20 MIN: CPT | Mod: 95,,, | Performed by: FAMILY MEDICINE

## 2022-04-22 PROCEDURE — 1160F RVW MEDS BY RX/DR IN RCRD: CPT | Mod: CPTII,95,, | Performed by: FAMILY MEDICINE

## 2022-04-22 PROCEDURE — 99213 PR OFFICE/OUTPT VISIT, EST, LEVL III, 20-29 MIN: ICD-10-PCS | Mod: 95,,, | Performed by: FAMILY MEDICINE

## 2022-04-22 PROCEDURE — 1160F PR REVIEW ALL MEDS BY PRESCRIBER/CLIN PHARMACIST DOCUMENTED: ICD-10-PCS | Mod: CPTII,95,, | Performed by: FAMILY MEDICINE

## 2022-04-22 PROCEDURE — 1159F PR MEDICATION LIST DOCUMENTED IN MEDICAL RECORD: ICD-10-PCS | Mod: CPTII,95,, | Performed by: FAMILY MEDICINE

## 2022-04-22 RX ORDER — CYCLOBENZAPRINE HCL 10 MG
10 TABLET ORAL 3 TIMES DAILY PRN
Qty: 30 TABLET | Refills: 2 | Status: SHIPPED | OUTPATIENT
Start: 2022-04-22

## 2022-04-22 NOTE — LETTER
April 22, 2022      [facility]  [address]  [place] [address]  Phone: [phone]  Fax: [phone]       Patient: Solomon Busby   YOB: 1985  Date of Visit: 04/22/2022    To Whom It May Concern:    Charles Busby  was at [facility] on 04/22/2022. Please excuse him from work today and through the weekend for an acute injury. He may return to work on Monday, 4/25/22 without restrictions. If you have any questions or concerns, or if I can be of further assistance, please do not hesitate to contact me.    Sincerely,    Radha cD, DO

## 2022-04-22 NOTE — PROGRESS NOTES
Assessment & Plan  Strain of lumbar paraspinous muscle, initial encounter  -     cyclobenzaprine (FLEXERIL) 10 MG tablet; Take 1 tablet (10 mg total) by mouth 3 (three) times daily as needed for Muscle spasms.  Dispense: 30 tablet; Refill: 2    Flexeril refilled. NSAIDs prn. May alternate heat and ice. Encouraged rest and no heavy lifting over the next 48-72 hours.   Work excuse provided.      The patient location is:  Patient Home   The chief complaint leading to consultation is: noted below  Visit type: Virtual visit with synchronous audio and video  Total time spent with patient: <10 minutes  Each patient to whom he or she provides medical services by telemedicine is:  (1) informed of the relationship between the physician and patient and the respective role of any other health care provider with respect to management of the patient; and (2) notified that he or she may decline to receive medical services by telemedicine and may withdraw from such care at any time.    Follow-up: Follow up if symptoms worsen or fail to improve.    ______________________________________________________________________    Chief Complaint  Chief Complaint   Patient presents with    Back Pain       HPI  Solomon Busby Jr. is a 36 y.o. male with multiple medical diagnoses as listed in the medical history and problem list that presents for acute onset of midline lumbar back pain that began yesterday via virtual visit. He states that he works by the [employer] and believes he may have strained his back lifting heavy chains. He took ibuprofen last night. Pain level is 8/10 today. He took Flexeril before for a back strain which gave him significant relief and is requesting a refill.       PAST MEDICAL HISTORY:  Past Medical History:   Diagnosis Date    Asthma     Headache     Hernia        PAST SURGICAL HISTORY:  Past Surgical History:   Procedure Laterality Date    HERNIA REPAIR      KNEE ARTHROSCOPY W/ ACL RECONSTRUCTION Right 9/23/2020     Procedure: RECONSTRUCTION, KNEE, ACL, ARTHROSCOPIC;  Surgeon: Ines Riley MD;  Location: [facility] OR;  Service: Orthopedics;  Laterality: Right;  ARTHREX LEANN [phone]  ZENON POSEYJOSEMANUEL [phone]  RN PREOP 9/21/2020-----COVID NEGATIVE ON 9/21    KNEE ARTHROSCOPY W/ DEBRIDEMENT Right 1/27/2021    Procedure: ARTHROSCOPY, KNEE, WITH DEBRIDEMENT;  Surgeon: Ines Riley MD;  Location: [facility] OR;  Service: Orthopedics;  Laterality: Right;  RN Pre Op and COVID NEGATIVE ON 1-25-21.  C A  ARTHREX LEANN MILLET [phone] TEXTED HIM @ 7:33AM ON 1-  10:AM START OK'ED W/ DR RILEY  INCOMPLETE H/P    KNEE JOINT MANIPULATION Right 1/27/2021    Procedure: MANIPULATION, KNEE;  Surgeon: Ines Riley MD;  Location: [facility] OR;  Service: Orthopedics;  Laterality: Right;    REPAIR OF MENISCUS OF KNEE Right 9/23/2020    Procedure: REPAIR, MENISCUS, KNEE;  Surgeon: Ines Riley MD;  Location: [facility] OR;  Service: Orthopedics;  Laterality: Right;       SOCIAL HISTORY:  Social History     Socioeconomic History    Marital status:    Tobacco Use    Smoking status: Never Smoker    Smokeless tobacco: Never Used   Substance and Sexual Activity    Alcohol use: No    Drug use: No    Sexual activity: Yes     Partners: Female     Social Determinants of Health     Financial Resource Strain: Medium Risk    Difficulty of Paying Living Expenses: Somewhat hard   Food Insecurity: Food Insecurity Present    Worried About Running Out of Food in the Last Year: Sometimes true    Ran Out of Food in the Last Year: Often true   Transportation Needs: Unmet Transportation Needs    Lack of Transportation (Medical): Yes    Lack of Transportation (Non-Medical): Yes   Physical Activity: Sufficiently Active    Days of Exercise per Week: 7 days    Minutes of Exercise per Session: 150+ min   Stress: Stress Concern Present    Feeling of Stress : To some extent   Social Connections: Unknown    Frequency of  Communication with Friends and Family: More than three times a week    Frequency of Social Gatherings with Friends and Family: Three times a week    Active Member of Clubs or Organizations: Yes    Attends Club or Organization Meetings: More than 4 times per year    Marital Status:    Housing Stability: High Risk    Unable to Pay for Housing in the Last Year: Yes    Number of Places Lived in the Last Year: 5    Unstable Housing in the Last Year: Yes       FAMILY HISTORY:  Family History   Problem Relation Age of Onset    Diabetes Mother     Hypertension Mother     Carpal tunnel syndrome Father     Liver disease Father     No Known Problems Sister     No Known Problems Brother     Allergies Daughter     Asthma Daughter     No Known Problems Son     No Known Problems Sister     No Known Problems Sister     No Known Problems Brother     No Known Problems Brother        ALLERGIES AND MEDICATIONS: updated and reviewed.  Review of patient's allergies indicates:  No Known Allergies  Current Outpatient Medications   Medication Sig Dispense Refill    acetaminophen-codeine 300-30mg (TYLENOL #3) 300-30 mg Tab Take 1 tablet by mouth 2 (two) times daily as needed (breakthrough pain). 30 tablet 0    AFLURIA QD 2020-21,3YR UP,,PF, 60 mcg (15 mcg x 4)/0.5 mL Syrg PHARMACY ADMINISTERED      albuterol (VENTOLIN HFA) 90 mcg/actuation inhaler Inhale 2 puffs into the lungs every 6 (six) hours as needed. Rescue 54 g 1    albuterol-ipratropium (DUO-NEB) 2.5 mg-0.5 mg/3 mL nebulizer solution Take 3 mLs by nebulization every 6 (six) hours as needed for Wheezing or Shortness of Breath. 100 vial 1    cetirizine (ZYRTEC) 5 MG tablet Take 5 mg by mouth once daily.      cyclobenzaprine (FLEXERIL) 10 MG tablet Take 1 tablet (10 mg total) by mouth 3 (three) times daily as needed for Muscle spasms. 30 tablet 2    fluticasone propionate (FLONASE) 50 mcg/actuation nasal spray 2 sprays (100 mcg total) by Each Nostril  route once daily. 15 g 0    meloxicam (MOBIC) 15 MG tablet Take 1 tablet (15 mg total) by mouth once daily. 30 tablet 1    multivitamin (THERAGRAN) per tablet Take 1 tablet by mouth once daily.      mupirocin (BACTROBAN) 2 % ointment Apply topically 3 (three) times daily. 30 g 1    naproxen (NAPROSYN) 500 MG tablet Take 1 tablet (500 mg total) by mouth 2 (two) times daily with meals. 60 tablet 2    QVAR REDIHALER 40 mcg/actuation HFAB INHALE 1 PUFF BY MOUTH INTO LUNGS TWICE DAILY 31.8 g 1     No current facility-administered medications for this visit.         ROS  Review of Systems   Constitutional: Positive for activity change. Negative for fever.   Cardiovascular: Negative for chest pain.   Gastrointestinal: Negative for abdominal pain.   Genitourinary: Negative for dysuria and hematuria.   Musculoskeletal: Positive for back pain.   Neurological: Negative for weakness, numbness and headaches.           Physical Exam  Physical Exam  Constitutional:       General: He is not in acute distress.  HENT:      Head: Normocephalic and atraumatic.   Neurological:      Mental Status: He is alert. Mental status is at baseline.   Psychiatric:         Mood and Affect: Mood normal.         Behavior: Behavior normal.         *Physical exam limited by virtual visit.    Health Maintenance       Date Due Completion Date    Hepatitis C Screening Never done ---    Lipid Panel 01/16/2023 1/16/2018    TETANUS VACCINE 07/09/2030 7/9/2020    Override on 6/30/2016: Done

## 2022-04-28 ENCOUNTER — TELEPHONE (OUTPATIENT)
Dept: SPORTS MEDICINE | Facility: CLINIC | Age: 37
End: 2022-04-28
Payer: COMMERCIAL

## 2022-04-28 NOTE — TELEPHONE ENCOUNTER
Left  for patient to return call to reschedule missed appointment today with Dr. Kiser at 11AM. Callback number provided.

## 2022-05-19 ENCOUNTER — OFFICE VISIT (OUTPATIENT)
Dept: ORTHOPEDICS | Facility: CLINIC | Age: 37
End: 2022-05-19
Payer: COMMERCIAL

## 2022-05-19 VITALS
OXYGEN SATURATION: 97 % | HEART RATE: 68 BPM | RESPIRATION RATE: 18 BRPM | DIASTOLIC BLOOD PRESSURE: 80 MMHG | BODY MASS INDEX: 33.48 KG/M2 | SYSTOLIC BLOOD PRESSURE: 120 MMHG | WEIGHT: 208.31 LBS | HEIGHT: 66 IN

## 2022-05-19 DIAGNOSIS — G56.01 CARPAL TUNNEL SYNDROME ON RIGHT: Primary | ICD-10-CM

## 2022-05-19 PROCEDURE — 99213 PR OFFICE/OUTPT VISIT, EST, LEVL III, 20-29 MIN: ICD-10-PCS | Mod: S$GLB,,, | Performed by: ORTHOPAEDIC SURGERY

## 2022-05-19 PROCEDURE — 1160F PR REVIEW ALL MEDS BY PRESCRIBER/CLIN PHARMACIST DOCUMENTED: ICD-10-PCS | Mod: CPTII,S$GLB,, | Performed by: ORTHOPAEDIC SURGERY

## 2022-05-19 PROCEDURE — 99213 OFFICE O/P EST LOW 20 MIN: CPT | Mod: S$GLB,,, | Performed by: ORTHOPAEDIC SURGERY

## 2022-05-19 PROCEDURE — 99999 PR PBB SHADOW E&M-EST. PATIENT-LVL III: CPT | Mod: PBBFAC,,, | Performed by: ORTHOPAEDIC SURGERY

## 2022-05-19 PROCEDURE — 99999 PR PBB SHADOW E&M-EST. PATIENT-LVL III: ICD-10-PCS | Mod: PBBFAC,,, | Performed by: ORTHOPAEDIC SURGERY

## 2022-05-19 PROCEDURE — 3079F PR MOST RECENT DIASTOLIC BLOOD PRESSURE 80-89 MM HG: ICD-10-PCS | Mod: CPTII,S$GLB,, | Performed by: ORTHOPAEDIC SURGERY

## 2022-05-19 PROCEDURE — 1159F MED LIST DOCD IN RCRD: CPT | Mod: CPTII,S$GLB,, | Performed by: ORTHOPAEDIC SURGERY

## 2022-05-19 PROCEDURE — 1160F RVW MEDS BY RX/DR IN RCRD: CPT | Mod: CPTII,S$GLB,, | Performed by: ORTHOPAEDIC SURGERY

## 2022-05-19 PROCEDURE — 3008F PR BODY MASS INDEX (BMI) DOCUMENTED: ICD-10-PCS | Mod: CPTII,S$GLB,, | Performed by: ORTHOPAEDIC SURGERY

## 2022-05-19 PROCEDURE — 1159F PR MEDICATION LIST DOCUMENTED IN MEDICAL RECORD: ICD-10-PCS | Mod: CPTII,S$GLB,, | Performed by: ORTHOPAEDIC SURGERY

## 2022-05-19 PROCEDURE — 3079F DIAST BP 80-89 MM HG: CPT | Mod: CPTII,S$GLB,, | Performed by: ORTHOPAEDIC SURGERY

## 2022-05-19 PROCEDURE — 3074F PR MOST RECENT SYSTOLIC BLOOD PRESSURE < 130 MM HG: ICD-10-PCS | Mod: CPTII,S$GLB,, | Performed by: ORTHOPAEDIC SURGERY

## 2022-05-19 PROCEDURE — 3008F BODY MASS INDEX DOCD: CPT | Mod: CPTII,S$GLB,, | Performed by: ORTHOPAEDIC SURGERY

## 2022-05-19 PROCEDURE — 3074F SYST BP LT 130 MM HG: CPT | Mod: CPTII,S$GLB,, | Performed by: ORTHOPAEDIC SURGERY

## 2022-05-19 NOTE — PROGRESS NOTES
Follow up visit    History of Present Illness:   Solomon comes to the office for follow up evaluation of right CTS  Here for EMG results  Inadequate relief with injection     Hurt knee at work recently - pain w flexion, giving way and popping  Has MRI scheduled [facility] this afternoon     ROS: unremarkable and no change since last visit    Physical Examination:    NAD  Right hand   No change in exam     L Knee exam pos for Medial Mcmurrays     Radiographic imaging: no new     Assessment/Plan:  36 y.o. male  with Right CTS, knee L knee injury     He will send me MRI results when he gets them - we can decide what to do about CT at that time

## 2022-06-09 ENCOUNTER — OFFICE VISIT (OUTPATIENT)
Dept: ORTHOPEDICS | Facility: CLINIC | Age: 37
End: 2022-06-09
Payer: COMMERCIAL

## 2022-06-09 VITALS
SYSTOLIC BLOOD PRESSURE: 126 MMHG | DIASTOLIC BLOOD PRESSURE: 68 MMHG | WEIGHT: 204.13 LBS | OXYGEN SATURATION: 95 % | RESPIRATION RATE: 18 BRPM | BODY MASS INDEX: 32.95 KG/M2 | HEART RATE: 90 BPM

## 2022-06-09 DIAGNOSIS — G56.01 CARPAL TUNNEL SYNDROME ON RIGHT: Primary | ICD-10-CM

## 2022-06-09 PROCEDURE — 20526 PR INJECT CARPAL TUNNEL: ICD-10-PCS | Mod: RT,S$GLB,, | Performed by: ORTHOPAEDIC SURGERY

## 2022-06-09 PROCEDURE — 1160F RVW MEDS BY RX/DR IN RCRD: CPT | Mod: CPTII,S$GLB,, | Performed by: ORTHOPAEDIC SURGERY

## 2022-06-09 PROCEDURE — 99499 NO LOS: ICD-10-PCS | Mod: S$GLB,,, | Performed by: ORTHOPAEDIC SURGERY

## 2022-06-09 PROCEDURE — 1160F PR REVIEW ALL MEDS BY PRESCRIBER/CLIN PHARMACIST DOCUMENTED: ICD-10-PCS | Mod: CPTII,S$GLB,, | Performed by: ORTHOPAEDIC SURGERY

## 2022-06-09 PROCEDURE — 3008F BODY MASS INDEX DOCD: CPT | Mod: CPTII,S$GLB,, | Performed by: ORTHOPAEDIC SURGERY

## 2022-06-09 PROCEDURE — 99999 PR PBB SHADOW E&M-EST. PATIENT-LVL IV: ICD-10-PCS | Mod: PBBFAC,,, | Performed by: ORTHOPAEDIC SURGERY

## 2022-06-09 PROCEDURE — 3074F SYST BP LT 130 MM HG: CPT | Mod: CPTII,S$GLB,, | Performed by: ORTHOPAEDIC SURGERY

## 2022-06-09 PROCEDURE — 1159F MED LIST DOCD IN RCRD: CPT | Mod: CPTII,S$GLB,, | Performed by: ORTHOPAEDIC SURGERY

## 2022-06-09 PROCEDURE — 1159F PR MEDICATION LIST DOCUMENTED IN MEDICAL RECORD: ICD-10-PCS | Mod: CPTII,S$GLB,, | Performed by: ORTHOPAEDIC SURGERY

## 2022-06-09 PROCEDURE — 3008F PR BODY MASS INDEX (BMI) DOCUMENTED: ICD-10-PCS | Mod: CPTII,S$GLB,, | Performed by: ORTHOPAEDIC SURGERY

## 2022-06-09 PROCEDURE — 20526 THER INJECTION CARP TUNNEL: CPT | Mod: RT,S$GLB,, | Performed by: ORTHOPAEDIC SURGERY

## 2022-06-09 PROCEDURE — 3074F PR MOST RECENT SYSTOLIC BLOOD PRESSURE < 130 MM HG: ICD-10-PCS | Mod: CPTII,S$GLB,, | Performed by: ORTHOPAEDIC SURGERY

## 2022-06-09 PROCEDURE — 99999 PR PBB SHADOW E&M-EST. PATIENT-LVL IV: CPT | Mod: PBBFAC,,, | Performed by: ORTHOPAEDIC SURGERY

## 2022-06-09 PROCEDURE — 3078F DIAST BP <80 MM HG: CPT | Mod: CPTII,S$GLB,, | Performed by: ORTHOPAEDIC SURGERY

## 2022-06-09 PROCEDURE — 3078F PR MOST RECENT DIASTOLIC BLOOD PRESSURE < 80 MM HG: ICD-10-PCS | Mod: CPTII,S$GLB,, | Performed by: ORTHOPAEDIC SURGERY

## 2022-06-09 PROCEDURE — 99499 UNLISTED E&M SERVICE: CPT | Mod: S$GLB,,, | Performed by: ORTHOPAEDIC SURGERY

## 2022-06-09 RX ADMIN — TRIAMCINOLONE ACETONIDE 40 MG: 40 INJECTION, SUSPENSION INTRA-ARTICULAR; INTRAMUSCULAR at 03:06

## 2022-06-09 NOTE — PROCEDURES
Carpal Tunnel    Date/Time: 6/9/2022 3:45 PM  Performed by: Ines Marina MD  Authorized by: Ines Marina MD     Consent Done?:  Yes (Verbal)  Indications:  Pain  Timeout: prior to procedure the correct patient, procedure, and site was verified    Prep: patient was prepped and draped in usual sterile fashion      Local anesthesia used?: Yes    Local anesthetic:  Topical anesthetic  Location:  Wrist  Ultrasonic Guidance for Needle Placement?: No    Needle size:  22 G  Approach:  Volar  Medications:  40 mg triamcinolone acetonide 40 mg/mL  Patient tolerance:  Patient tolerated the procedure well with no immediate complications

## 2022-06-10 ENCOUNTER — PATIENT MESSAGE (OUTPATIENT)
Dept: ORTHOPEDICS | Facility: CLINIC | Age: 37
End: 2022-06-10
Payer: COMMERCIAL

## 2022-06-10 RX ORDER — TRIAMCINOLONE ACETONIDE 40 MG/ML
40 INJECTION, SUSPENSION INTRA-ARTICULAR; INTRAMUSCULAR
Status: DISCONTINUED | OUTPATIENT
Start: 2022-06-09 | End: 2022-06-10 | Stop reason: HOSPADM

## 2022-06-10 NOTE — PROGRESS NOTES
Follow up visit    History of Present Illness:   Solomon comes to the office for follow up evaluation of right CTS. Would like to proceed with CTR but is scheduled for L ACL recon w Dr Vincent soon - work related injury, will be on crutches  Would like to proceed with CT injection for symptom control for now     ROS: unremarkable and no change since last visit    Physical Examination:    Vitals:    06/09/22 1544   BP: 126/68   Pulse: 90   Resp: 18   SpO2: 95%   Weight: 92.6 kg (204 lb 2.3 oz)   PainSc: 10-Worst pain ever   PainLoc: Hand        NAD  Right hand  No change in exam     Radiographic imaging: no new     Assessment/Plan:  36 y.o. male  with Right CTS    We discussed the etiology of persistent pain and further treatment options.  Injection of the left carpal tunnel performed, please see procedure note for more details.  Prior to the injection risks and benefits of corticosteroid injection were discussed with the patient including pain, infection, bleeding, skin color changes, swelling, steroid flare. We discussed that over time injections can result in chondral damage, acceleration of arthritis formation, damage to tendons and damage to joints.  The patient consented for the procedure.  Post-injection instructions were given to the patient in writing.    All questions were answered in detail. The patient  verbalized the understanding of the treatment plan and is in full agreement with the treatment plan.

## 2022-07-12 ENCOUNTER — HOSPITAL ENCOUNTER (EMERGENCY)
Facility: HOSPITAL | Age: 37
Discharge: HOME | End: 2022-07-13
Attending: INTERNAL MEDICINE
Payer: COMMERCIAL

## 2022-07-12 ENCOUNTER — NURSE TRIAGE (OUTPATIENT)
Dept: ADMINISTRATIVE | Facility: CLINIC | Age: 37
End: 2022-07-12
Payer: COMMERCIAL

## 2022-07-12 ENCOUNTER — OFFICE VISIT (OUTPATIENT)
Dept: FAMILY MEDICINE | Facility: CLINIC | Age: 37
End: 2022-07-12
Payer: COMMERCIAL

## 2022-07-12 VITALS
BODY MASS INDEX: 32.49 KG/M2 | DIASTOLIC BLOOD PRESSURE: 82 MMHG | OXYGEN SATURATION: 97 % | HEART RATE: 92 BPM | SYSTOLIC BLOOD PRESSURE: 128 MMHG | WEIGHT: 202.19 LBS | TEMPERATURE: 101 F | HEIGHT: 66 IN

## 2022-07-12 DIAGNOSIS — E87.6 HYPOKALEMIA: ICD-10-CM

## 2022-07-12 DIAGNOSIS — J98.01 BRONCHOSPASM: ICD-10-CM

## 2022-07-12 DIAGNOSIS — Z20.822 EXPOSURE TO COVID-19 VIRUS: ICD-10-CM

## 2022-07-12 DIAGNOSIS — B34.9 VIRAL SYNDROME: ICD-10-CM

## 2022-07-12 DIAGNOSIS — R09.82 POST-NASAL DRIP: ICD-10-CM

## 2022-07-12 DIAGNOSIS — R50.9 FEVER: ICD-10-CM

## 2022-07-12 DIAGNOSIS — R50.9 FEVER, UNSPECIFIED FEVER CAUSE: ICD-10-CM

## 2022-07-12 DIAGNOSIS — U07.1 COVID-19: Primary | ICD-10-CM

## 2022-07-12 DIAGNOSIS — J06.9 UPPER RESPIRATORY TRACT INFECTION, UNSPECIFIED TYPE: Primary | ICD-10-CM

## 2022-07-12 DIAGNOSIS — R51.9 HEAD ACHE: ICD-10-CM

## 2022-07-12 DIAGNOSIS — R00.2 PALPITATIONS: ICD-10-CM

## 2022-07-12 DIAGNOSIS — R05.9 COUGH: ICD-10-CM

## 2022-07-12 LAB
ALBUMIN SERPL BCP-MCNC: 4 G/DL (ref 3.5–5.2)
ALP SERPL-CCNC: 65 U/L (ref 55–135)
ALT SERPL W/O P-5'-P-CCNC: 64 U/L (ref 10–44)
ANION GAP SERPL CALC-SCNC: 12 MMOL/L (ref 8–16)
AST SERPL-CCNC: 33 U/L (ref 10–40)
BASOPHILS # BLD AUTO: 0.07 K/UL (ref 0–0.2)
BASOPHILS NFR BLD: 1 % (ref 0–1.9)
BILIRUB SERPL-MCNC: 0.4 MG/DL (ref 0.1–1)
BNP SERPL-MCNC: <10 PG/ML (ref 0–99)
BUN SERPL-MCNC: 9 MG/DL (ref 6–20)
CALCIUM SERPL-MCNC: 9.8 MG/DL (ref 8.7–10.5)
CHLORIDE SERPL-SCNC: 102 MMOL/L (ref 95–110)
CO2 SERPL-SCNC: 27 MMOL/L (ref 23–29)
CREAT SERPL-MCNC: 1.3 MG/DL (ref 0.5–1.4)
D DIMER PPP IA.FEU-MCNC: 3.52 MG/L FEU
DIFFERENTIAL METHOD: ABNORMAL
EOSINOPHIL # BLD AUTO: 0 K/UL (ref 0–0.5)
EOSINOPHIL NFR BLD: 0.4 % (ref 0–8)
ERYTHROCYTE [DISTWIDTH] IN BLOOD BY AUTOMATED COUNT: 13.4 % (ref 11.5–14.5)
EST. GFR  (AFRICAN AMERICAN): >60 ML/MIN/1.73 M^2
EST. GFR  (NON AFRICAN AMERICAN): >60 ML/MIN/1.73 M^2
GLUCOSE SERPL-MCNC: 84 MG/DL (ref 70–110)
HCT VFR BLD AUTO: 47.1 % (ref 40–54)
HGB BLD-MCNC: 15.3 G/DL (ref 14–18)
IMM GRANULOCYTES # BLD AUTO: 0.03 K/UL (ref 0–0.04)
IMM GRANULOCYTES NFR BLD AUTO: 0.4 % (ref 0–0.5)
LYMPHOCYTES # BLD AUTO: 1.9 K/UL (ref 1–4.8)
LYMPHOCYTES NFR BLD: 25.6 % (ref 18–48)
MCH RBC QN AUTO: 26.7 PG (ref 27–31)
MCHC RBC AUTO-ENTMCNC: 32.5 G/DL (ref 32–36)
MCV RBC AUTO: 82 FL (ref 82–98)
MONOCYTES # BLD AUTO: 1.2 K/UL (ref 0.3–1)
MONOCYTES NFR BLD: 16.3 % (ref 4–15)
NEUTROPHILS # BLD AUTO: 4.1 K/UL (ref 1.8–7.7)
NEUTROPHILS NFR BLD: 56.3 % (ref 38–73)
NRBC BLD-RTO: 0 /100 WBC
PLATELET # BLD AUTO: 292 K/UL (ref 150–450)
PMV BLD AUTO: 9.4 FL (ref 9.2–12.9)
POTASSIUM SERPL-SCNC: 3.4 MMOL/L (ref 3.5–5.1)
PROT SERPL-MCNC: 8.6 G/DL (ref 6–8.4)
RBC # BLD AUTO: 5.73 M/UL (ref 4.6–6.2)
SODIUM SERPL-SCNC: 141 MMOL/L (ref 136–145)
TROPONIN I SERPL DL<=0.01 NG/ML-MCNC: <0.006 NG/ML (ref 0–0.03)
TROPONIN I SERPL DL<=0.01 NG/ML-MCNC: <0.006 NG/ML (ref 0–0.03)
TSH SERPL DL<=0.005 MIU/L-ACNC: 1.63 UIU/ML (ref 0.4–4)
WBC # BLD AUTO: 7.34 K/UL (ref 3.9–12.7)

## 2022-07-12 PROCEDURE — 85379 FIBRIN DEGRADATION QUANT: CPT | Performed by: NURSE PRACTITIONER

## 2022-07-12 PROCEDURE — 1160F PR REVIEW ALL MEDS BY PRESCRIBER/CLIN PHARMACIST DOCUMENTED: ICD-10-PCS | Mod: CPTII,S$GLB,, | Performed by: FAMILY MEDICINE

## 2022-07-12 PROCEDURE — 99999 PR PBB SHADOW E&M-EST. PATIENT-LVL III: ICD-10-PCS | Mod: PBBFAC,,, | Performed by: FAMILY MEDICINE

## 2022-07-12 PROCEDURE — 3079F DIAST BP 80-89 MM HG: CPT | Mod: CPTII,S$GLB,, | Performed by: FAMILY MEDICINE

## 2022-07-12 PROCEDURE — 93010 EKG 12-LEAD: ICD-10-PCS | Mod: ,,, | Performed by: INTERNAL MEDICINE

## 2022-07-12 PROCEDURE — 83880 ASSAY OF NATRIURETIC PEPTIDE: CPT | Performed by: NURSE PRACTITIONER

## 2022-07-12 PROCEDURE — U0003 INFECTIOUS AGENT DETECTION BY NUCLEIC ACID (DNA OR RNA); SEVERE ACUTE RESPIRATORY SYNDROME CORONAVIRUS 2 (SARS-COV-2) (CORONAVIRUS DISEASE [COVID-19]), AMPLIFIED PROBE TECHNIQUE, MAKING USE OF HIGH THROUGHPUT TECHNOLOGIES AS DESCRIBED BY CMS-2020-01-R: HCPCS | Performed by: FAMILY MEDICINE

## 2022-07-12 PROCEDURE — 3008F BODY MASS INDEX DOCD: CPT | Mod: CPTII,S$GLB,, | Performed by: FAMILY MEDICINE

## 2022-07-12 PROCEDURE — 3074F SYST BP LT 130 MM HG: CPT | Mod: CPTII,S$GLB,, | Performed by: FAMILY MEDICINE

## 2022-07-12 PROCEDURE — 1159F MED LIST DOCD IN RCRD: CPT | Mod: CPTII,S$GLB,, | Performed by: FAMILY MEDICINE

## 2022-07-12 PROCEDURE — 93010 ELECTROCARDIOGRAM REPORT: CPT | Mod: ,,, | Performed by: INTERNAL MEDICINE

## 2022-07-12 PROCEDURE — 1159F PR MEDICATION LIST DOCUMENTED IN MEDICAL RECORD: ICD-10-PCS | Mod: CPTII,S$GLB,, | Performed by: FAMILY MEDICINE

## 2022-07-12 PROCEDURE — 3074F PR MOST RECENT SYSTOLIC BLOOD PRESSURE < 130 MM HG: ICD-10-PCS | Mod: CPTII,S$GLB,, | Performed by: FAMILY MEDICINE

## 2022-07-12 PROCEDURE — 85025 COMPLETE CBC W/AUTO DIFF WBC: CPT | Performed by: NURSE PRACTITIONER

## 2022-07-12 PROCEDURE — 3008F PR BODY MASS INDEX (BMI) DOCUMENTED: ICD-10-PCS | Mod: CPTII,S$GLB,, | Performed by: FAMILY MEDICINE

## 2022-07-12 PROCEDURE — 93005 ELECTROCARDIOGRAM TRACING: CPT

## 2022-07-12 PROCEDURE — 99999 PR PBB SHADOW E&M-EST. PATIENT-LVL III: CPT | Mod: PBBFAC,,, | Performed by: FAMILY MEDICINE

## 2022-07-12 PROCEDURE — 84484 ASSAY OF TROPONIN QUANT: CPT | Mod: 91 | Performed by: NURSE PRACTITIONER

## 2022-07-12 PROCEDURE — 1160F RVW MEDS BY RX/DR IN RCRD: CPT | Mod: CPTII,S$GLB,, | Performed by: FAMILY MEDICINE

## 2022-07-12 PROCEDURE — 99214 OFFICE O/P EST MOD 30 MIN: CPT | Mod: S$GLB,,, | Performed by: FAMILY MEDICINE

## 2022-07-12 PROCEDURE — 3079F PR MOST RECENT DIASTOLIC BLOOD PRESSURE 80-89 MM HG: ICD-10-PCS | Mod: CPTII,S$GLB,, | Performed by: FAMILY MEDICINE

## 2022-07-12 PROCEDURE — 99214 PR OFFICE/OUTPT VISIT, EST, LEVL IV, 30-39 MIN: ICD-10-PCS | Mod: S$GLB,,, | Performed by: FAMILY MEDICINE

## 2022-07-12 PROCEDURE — 84443 ASSAY THYROID STIM HORMONE: CPT | Performed by: NURSE PRACTITIONER

## 2022-07-12 PROCEDURE — U0005 INFEC AGEN DETEC AMPLI PROBE: HCPCS | Performed by: FAMILY MEDICINE

## 2022-07-12 PROCEDURE — 80053 COMPREHEN METABOLIC PANEL: CPT | Performed by: NURSE PRACTITIONER

## 2022-07-12 PROCEDURE — 99285 EMERGENCY DEPT VISIT HI MDM: CPT | Mod: 25

## 2022-07-12 RX ORDER — CELECOXIB 100 MG/1
100 CAPSULE ORAL
COMMUNITY

## 2022-07-12 RX ORDER — IPRATROPIUM BROMIDE AND ALBUTEROL SULFATE 2.5; .5 MG/3ML; MG/3ML
3 SOLUTION RESPIRATORY (INHALATION) EVERY 6 HOURS PRN
Qty: 100 EACH | Refills: 3 | Status: SHIPPED | OUTPATIENT
Start: 2022-07-12

## 2022-07-12 RX ORDER — PROMETHAZINE HYDROCHLORIDE AND DEXTROMETHORPHAN HYDROBROMIDE 6.25; 15 MG/5ML; MG/5ML
5 SYRUP ORAL 3 TIMES DAILY PRN
Qty: 118 ML | Refills: 1 | Status: SHIPPED | OUTPATIENT
Start: 2022-07-12

## 2022-07-12 RX ORDER — ENOXAPARIN SODIUM 100 MG/ML
1 INJECTION SUBCUTANEOUS
Status: COMPLETED | OUTPATIENT
Start: 2022-07-13 | End: 2022-07-13

## 2022-07-12 RX ORDER — OXYCODONE AND ACETAMINOPHEN 10; 325 MG/1; MG/1
1 TABLET ORAL EVERY 4 HOURS PRN
COMMUNITY

## 2022-07-12 NOTE — PROGRESS NOTES
"  Physical Exam  /82   Pulse 102   Temp (!) 100.7 °F (38.2 °C) (Oral)   Ht 5' 6" (1.676 m)   Wt 91.7 kg (202 lb 2.6 oz)   SpO2 97%   BMI 32.63 kg/m²      Office Visit    Patient Name: Solomon Busby Jr.    : 1985  MRN: [id number]      Assessment/Plan:  Solomon Busby Jr. is a 36 y.o. male who presents today for :    Upper respiratory tract infection, unspecified type  -     COVID-19 Routine Screening; Future  -   NEG  POCT Influenza A/B  Exposure to COVID-19 virus  Post-nasal drip  Cough  -     promethazine-dextromethorphan (PROMETHAZINE-DM) 6.25-15 mg/5 mL Syrp; Take 5 mLs by mouth 3 (three) times daily as needed (cough).  Dispense: 118 mL; Refill: 1  Fever, unspecified fever cause  Head ache  Fever  -mild fever, symptoms tolerable. Continue with supportive care, Claritin PRN for drainage - Tylenol every 4-6 hours as needed for fever, headaches, sore throat, ear pain, bodyaches, and/or nasal/sinus inflammation. Reassurance provided, as well as self-isolation instructions for presumed COVID - f/u swab. Discussed Paxlovid with patient, which patient declined at this time due to potential side effect profile.              Follow up for worsening Sx. Urgent care/ED precautions provided.        This note was created by combination of typed  and MModal dictation.  Transcription errors may be present.  If there are any questions, please contact me.      ----------------------------------------------------------------------------------------------------------------------      HPI:  Patient Care Team:  Kaushik Shaw MD as PCP - General (Family Medicine)  Elsa Ross MA (Inactive) as Care Coordinator    Solomon is a 36 y.o. male with      Patient Active Problem List   Diagnosis    Reactive airway disease    Acute URI    Quadriceps weakness    Decreased range of motion (ROM) of right knee    Acute pain of right knee    Difficulty in walking    Difficulty participating in sporting " activities    Swelling of knee    Complete tear of right ACL, initial encounter    Arthrofibrosis of knee joint, right       Patient with PMHx as above presents today for :  Fever, Chills, Cough, and Generalized Body Aches  for the past 2 days, associated with throat drainage. He admits that he was exposed to his son, who had COVID 4 days ago. Patient endorses decreased appetite, and feeling malaise overall.  He has been taking Tylenol regularly with good reduction of fever at home. He denies ear pain/SOB/loss of taste nor smell.      Answers for HPI/ROS submitted by the patient on 7/12/2022  activity change: Yes  unexpected weight change: No  neck pain: No  hearing loss: No  rhinorrhea: No  trouble swallowing: No  eye discharge: No  visual disturbance: No  chest tightness: No  wheezing: No  chest pain: Yes  palpitations: No  blood in stool: No  constipation: No  vomiting: No  diarrhea: No  polydipsia: No  polyuria: No  difficulty urinating: No  urgency: No  hematuria: No  joint swelling: No  arthralgias: No  headaches: Yes  weakness: Yes  confusion: No  dysphoric mood: No          Patient Active Problem List   Diagnosis    Reactive airway disease    Acute URI    Quadriceps weakness    Decreased range of motion (ROM) of right knee    Acute pain of right knee    Difficulty in walking    Difficulty participating in sporting activities    Swelling of knee    Complete tear of right ACL, initial encounter    Arthrofibrosis of knee joint, right       Current Medications  Medications reviewed and updated.       Current Outpatient Medications:     acetaminophen-codeine 300-30mg (TYLENOL #3) 300-30 mg Tab, Take 1 tablet by mouth 2 (two) times daily as needed (breakthrough pain)., Disp: 30 tablet, Rfl: 0    AFLURIA QD 2020-21,3YR UP,,PF, 60 mcg (15 mcg x 4)/0.5 mL Syrg, PHARMACY ADMINISTERED, Disp: , Rfl:     albuterol (VENTOLIN HFA) 90 mcg/actuation inhaler, Inhale 2 puffs into the lungs every 6 (six) hours  as needed. Rescue, Disp: 54 g, Rfl: 1    celecoxib (CELEBREX) 100 MG capsule, Take 100 mg by mouth., Disp: , Rfl:     cetirizine (ZYRTEC) 5 MG tablet, Take 5 mg by mouth once daily., Disp: , Rfl:     cyclobenzaprine (FLEXERIL) 10 MG tablet, Take 1 tablet (10 mg total) by mouth 3 (three) times daily as needed for Muscle spasms., Disp: 30 tablet, Rfl: 2    fluticasone propionate (FLONASE) 50 mcg/actuation nasal spray, 2 sprays (100 mcg total) by Each Nostril route once daily., Disp: 15 g, Rfl: 0    meloxicam (MOBIC) 15 MG tablet, Take 1 tablet (15 mg total) by mouth once daily., Disp: 30 tablet, Rfl: 1    multivitamin (THERAGRAN) per tablet, Take 1 tablet by mouth once daily., Disp: , Rfl:     mupirocin (BACTROBAN) 2 % ointment, Apply topically 3 (three) times daily., Disp: 30 g, Rfl: 1    oxyCODONE-acetaminophen (PERCOCET)  mg per tablet, Take 1 tablet by mouth every 4 (four) hours as needed for Pain., Disp: , Rfl:     QVAR REDIHALER 40 mcg/actuation HFAB, INHALE 1 PUFF BY MOUTH INTO LUNGS TWICE DAILY, Disp: 31.8 g, Rfl: 1    albuterol-ipratropium (DUO-NEB) 2.5 mg-0.5 mg/3 mL nebulizer solution, Take 3 mLs by nebulization every 6 (six) hours as needed for Wheezing or Shortness of Breath., Disp: 100 each, Rfl: 3    promethazine-dextromethorphan (PROMETHAZINE-DM) 6.25-15 mg/5 mL Syrp, Take 5 mLs by mouth 3 (three) times daily as needed (cough)., Disp: 118 mL, Rfl: 1    Past Surgical History:   Procedure Laterality Date    ANTERIOR CRUCIATE LIGAMENT REPAIR      HERNIA REPAIR      KNEE ARTHROSCOPY W/ ACL RECONSTRUCTION Right 09/23/2020    Procedure: RECONSTRUCTION, KNEE, ACL, ARTHROSCOPIC;  Surgeon: Ines Marina MD;  [facility];  Service: Orthopedics;  Laterality: Right;  ARTHREX LEANN [phone]  ZENON ROMANO [facility] [phone]  RN PREOP 9/21/2020-----COVID NEGATIVE ON 9/21    KNEE ARTHROSCOPY W/ DEBRIDEMENT Right 01/27/2021    Procedure: ARTHROSCOPY, KNEE, WITH DEBRIDEMENT;   Surgeon: Ines Riley MD;  Location: [facility] OR;  Service: Orthopedics;  Laterality: Right;  RN Pre Op and COVID NEGATIVE ON 1-25-21.  C A  ARTHREX LEANN MILLET [phone] TEXTED HIM @ 7:33AM ON 1-  10:AM START OK'ED W/ DR RILEY  INCOMPLETE H/P    KNEE JOINT MANIPULATION Right 01/27/2021    Procedure: MANIPULATION, KNEE;  Surgeon: Ines Riley MD;  Location: [facility] OR;  Service: Orthopedics;  Laterality: Right;    REPAIR OF MENISCUS OF KNEE Right 09/23/2020    Procedure: REPAIR, MENISCUS, KNEE;  Surgeon: Ines Riley MD;  Location: [facility] OR;  Service: Orthopedics;  Laterality: Right;       Family History   Problem Relation Age of Onset    Diabetes Mother     Hypertension Mother     Carpal tunnel syndrome Father     Liver disease Father     No Known Problems Sister     No Known Problems Brother     Allergies Daughter     Asthma Daughter     No Known Problems Son     No Known Problems Sister     No Known Problems Sister     No Known Problems Brother     No Known Problems Brother        Social History     Socioeconomic History    Marital status:    Tobacco Use    Smoking status: Never Smoker    Smokeless tobacco: Never Used   Substance and Sexual Activity    Alcohol use: No    Drug use: No    Sexual activity: Yes     Partners: Female     Social Determinants of Health     Financial Resource Strain: Medium Risk    Difficulty of Paying Living Expenses: Somewhat hard   Food Insecurity: Food Insecurity Present    Worried About Running Out of Food in the Last Year: Sometimes true    Ran Out of Food in the Last Year: Often true   Transportation Needs: Unmet Transportation Needs    Lack of Transportation (Medical): Yes    Lack of Transportation (Non-Medical): Yes   Physical Activity: Sufficiently Active    Days of Exercise per Week: 7 days    Minutes of Exercise per Session: 150+ min   Stress: Stress Concern Present    Feeling of Stress : To some extent   Social  "Connections: Unknown    Frequency of Communication with Friends and Family: More than three times a week    Frequency of Social Gatherings with Friends and Family: Three times a week    Active Member of Clubs or Organizations: Yes    Attends Club or Organization Meetings: More than 4 times per year    Marital Status:    Housing Stability: High Risk    Unable to Pay for Housing in the Last Year: Yes    Number of Places Lived in the Last Year: 5    Unstable Housing in the Last Year: Yes             Allergies   Review of patient's allergies indicates:  No Known Allergies          Review of Systems  See HPI      [unfilled]  /82   Pulse 102   Temp (!) 100.7 °F (38.2 °C) (Oral)   Ht 5' 6" (1.676 m)   Wt 91.7 kg (202 lb 2.6 oz)   SpO2 97%   BMI 32.63 kg/m²     GEN: NAD, well developed  HEENT: NCAT, PERRLA, EOMI, sclera clear, anicteric, TM clear bilaterally with normal light reflex, mild nasal turbinate swelling, MMM with no lesions, O/P with mild drainage but otherwise no tonsillar swelling/discharge, +minimal clear nasal discharge, no frontal/maxillary TTP, No trismus/uvula deviation  NECK: normal, supple with midline trachea, no LAD, no thyromegaly  LUNGS: CTAB, no w/r/r, no increased work of breathing  HEART: RRR, normal S1 and S2, no m/r/g  ABD: s/nt/nd, NABS  SKIN: normal turgor, no rashes, no other lesions.   PSYCH: AOx3, appropriate mood and affect      "

## 2022-07-13 VITALS
DIASTOLIC BLOOD PRESSURE: 80 MMHG | RESPIRATION RATE: 12 BRPM | WEIGHT: 180 LBS | HEIGHT: 66 IN | HEART RATE: 99 BPM | OXYGEN SATURATION: 97 % | SYSTOLIC BLOOD PRESSURE: 153 MMHG | TEMPERATURE: 100 F | BODY MASS INDEX: 28.93 KG/M2

## 2022-07-13 PROBLEM — E87.6 HYPOKALEMIA: Status: ACTIVE | Noted: 2022-07-13

## 2022-07-13 PROBLEM — R00.2 PALPITATIONS: Status: ACTIVE | Noted: 2022-07-13

## 2022-07-13 LAB
SARS-COV-2 RNA RESP QL NAA+PROBE: DETECTED
SARS-COV-2- CYCLE NUMBER: 15

## 2022-07-13 PROCEDURE — 96372 THER/PROPH/DIAG INJ SC/IM: CPT | Performed by: INTERNAL MEDICINE

## 2022-07-13 PROCEDURE — 25000003 PHARM REV CODE 250: Performed by: INTERNAL MEDICINE

## 2022-07-13 PROCEDURE — 63600175 PHARM REV CODE 636 W HCPCS: Performed by: INTERNAL MEDICINE

## 2022-07-13 PROCEDURE — 25500020 PHARM REV CODE 255: Performed by: INTERNAL MEDICINE

## 2022-07-13 RX ADMIN — POTASSIUM BICARBONATE 40 MEQ: 391 TABLET, EFFERVESCENT ORAL at 01:07

## 2022-07-13 RX ADMIN — ENOXAPARIN SODIUM 80 MG: 80 INJECTION SUBCUTANEOUS at 12:07

## 2022-07-13 RX ADMIN — IOHEXOL 70 ML: 350 INJECTION, SOLUTION INTRAVENOUS at 12:07

## 2022-07-13 NOTE — ED PROVIDER NOTES
Encounter Date: 7/12/2022    SCRIBE #1 NOTE: I, Heather Rebolledo, am scribing for, and in the presence of, Jamshid Davis MD.       History     Chief Complaint   Patient presents with    Palpitations     Pt reports he was sitting up in the bed and suddenly became tachycardic. Pt reports a heart rate of 140's on his Apple watch. Pt reports he called the nurse line and was recommended to come to ED for further eval. Reports some SOB.      Solomon Busby Jr. is a 36 y.o. male, with a PMHx of asthma, who presents to the ED with tachycardia. Patient reports tonight prior to arrival, he was sitting up in his bed watching TV, when he received a notification on his Apple Watch telling him his heart rate was elevated at 120 bpm. He subsequently received 2 additional notifications stating his heart rate was tachycardic within the next 30 minutes. Reports staying hydrated with consuming 4 bottles of water today. He denies any illicit drug, cigarette, or EtOH use. He denies any energy drink or caffeine intake today. He denies any strenuous workouts lately. He denies any cardiac hx. No other exacerbating or alleviating factors. Denies nausea, vomiting, diarrhea, or other associated symptoms. Denies feeling anxious or hx of anxiety.     The history is provided by the patient.     Review of patient's allergies indicates:  No Known Allergies  Past Medical History:   Diagnosis Date    Asthma     Headache     Hernia     Torn ACL      Past Surgical History:   Procedure Laterality Date    ANTERIOR CRUCIATE LIGAMENT REPAIR      HERNIA REPAIR      KNEE ARTHROSCOPY W/ ACL RECONSTRUCTION Right 09/23/2020    Procedure: RECONSTRUCTION, KNEE, ACL, ARTHROSCOPIC;  Surgeon: Ines Marina MD;  Location: [facility] OR;  Service: Orthopedics;  Laterality: Right;  ARTHREX LEANN [phone]  ZENON BONDS [phone]  RN PREOP 9/21/2020-----COVID NEGATIVE ON 9/21    KNEE ARTHROSCOPY W/ DEBRIDEMENT Right 01/27/2021    Procedure:  ARTHROSCOPY, KNEE, WITH DEBRIDEMENT;  Surgeon: Ines Riley MD;  Location: [facility] OR;  Service: Orthopedics;  Laterality: Right;  RN Pre Op and COVID NEGATIVE ON 1-25-21.  C A  ARTHREX LEANN MILLET [phone] TEXTED HIM @ 7:33AM ON 1-  10:AM START OK'ED W/ DR RILEY  INCOMPLETE H/P    KNEE JOINT MANIPULATION Right 01/27/2021    Procedure: MANIPULATION, KNEE;  Surgeon: Ines Riley MD;  Location: [facility] OR;  Service: Orthopedics;  Laterality: Right;    REPAIR OF MENISCUS OF KNEE Right 09/23/2020    Procedure: REPAIR, MENISCUS, KNEE;  Surgeon: Ines Riley MD;  Location: [facility] OR;  Service: Orthopedics;  Laterality: Right;     Family History   Problem Relation Age of Onset    Diabetes Mother     Hypertension Mother     Carpal tunnel syndrome Father     Liver disease Father     No Known Problems Sister     No Known Problems Brother     Allergies Daughter     Asthma Daughter     No Known Problems Son     No Known Problems Sister     No Known Problems Sister     No Known Problems Brother     No Known Problems Brother      Social History     Tobacco Use    Smoking status: Never Smoker    Smokeless tobacco: Never Used   Substance Use Topics    Alcohol use: No    Drug use: No     Review of Systems   Constitutional: Negative for fever.   HENT: Negative for sore throat.    Respiratory: Negative for shortness of breath.    Cardiovascular: Negative for chest pain.        +tachycardia.    Gastrointestinal: Negative for diarrhea, nausea and vomiting.   Genitourinary: Negative for dysuria.   Musculoskeletal: Negative for back pain.   Skin: Negative for rash.   Neurological: Negative for weakness and headaches.   Psychiatric/Behavioral: Negative for behavioral problems. The patient is not nervous/anxious.    All other systems reviewed and are negative.      Physical Exam     Initial Vitals [07/12/22 2052]   BP Pulse Resp Temp SpO2   131/82 (!) 113 20 99.9 °F (37.7 °C) 100 %      MAP       --          Physical Exam    Nursing note and vitals reviewed.  Constitutional: He appears well-developed and well-nourished.   HENT:   Head: Normocephalic and atraumatic.   Eyes: Conjunctivae are normal.   Neck: Neck supple.   Normal range of motion.  Cardiovascular: Regular rhythm and normal heart sounds. Tachycardia present.  Exam reveals no gallop and no friction rub.    No murmur heard.  Pulmonary/Chest: Breath sounds normal. No respiratory distress. He has no wheezes. He has no rhonchi. He has no rales.   Abdominal: Abdomen is soft. There is no abdominal tenderness.   Musculoskeletal:         General: No edema. Normal range of motion.      Cervical back: Normal range of motion and neck supple.     Neurological: He is alert and oriented to person, place, and time. GCS score is 15. GCS eye subscore is 4. GCS verbal subscore is 5. GCS motor subscore is 6.   Skin: Skin is warm and dry.   Psychiatric: He has a normal mood and affect.         ED Course   Procedures  Labs Reviewed   CBC W/ AUTO DIFFERENTIAL - Abnormal; Notable for the following components:       Result Value    MCH 26.7 (*)     Mono # 1.2 (*)     Mono % 16.3 (*)     All other components within normal limits   COMPREHENSIVE METABOLIC PANEL - Abnormal; Notable for the following components:    Potassium 3.4 (*)     Total Protein 8.6 (*)     ALT 64 (*)     All other components within normal limits   D DIMER, QUANTITATIVE - Abnormal; Notable for the following components:    D-Dimer 3.52 (*)     All other components within normal limits   TROPONIN I   B-TYPE NATRIURETIC PEPTIDE   TSH   TROPONIN I     EKG Readings: (Independently Interpreted)   Initial Reading: No STEMI.   Sinus tachycardia with a rate of 119 bpm. Normal ST segments. T wave inversion in V1.      ECG Results          EKG 12-lead (Final result)  Result time 07/13/22 08:48:37    Final result by Interface, Lab In [facility] (07/13/22 08:48:37)                 Narrative:    Test Reason : R00.2,    Vent.  Rate : 119 BPM     Atrial Rate : 119 BPM     P-R Int : 144 ms          QRS Dur : 088 ms      QT Int : 328 ms       P-R-T Axes : 060 200 022 degrees     QTc Int : 461 ms    Sinus tachycardia  Possible Left atrial enlargement  Right superior axis deviation  Right ventricular hypertrophy  Abnormal ECG  When compared with ECG of 11-FEB-2020 05:57,  Significant changes have occurred  Confirmed by Renate MICHELLE, Homero ([id number]) on 7/13/2022 8:48:26 AM    Referred By: [email or web address]   SELF           Confirmed By:Homero Dewitt MD                            Imaging Results          CTA Chest Non-Coronary (PE Study) (Final result)  Result time 07/13/22 00:26:28    Final result by Erick Good MD (07/13/22 00:26:28)                 Impression:      No acute intrathoracic abnormalities identified.  No evidence of PE.      Electronically signed by: Erick Good MD  Date:    07/13/2022  Time:    00:26             Narrative:    EXAMINATION:  CTA CHEST NON CORONARY    CLINICAL HISTORY:  Pulmonary embolism (PE) suspected, positive D-dimer;    TECHNIQUE:  Low dose axial images, sagittal and coronal reformations were obtained from the thoracic inlet to the lung bases following the IV administration of 75 mL of Omnipaque 350.  Contrast timing was optimized to evaluate the pulmonary arteries.  MIP images were performed.    COMPARISON:  None    FINDINGS:  Structures at the base of the neck are unremarkable.  Aorta is non-aneurysmal.  The heart is normal in size without pericardial effusion. No intraluminal filling defects within the pulmonary arteries to suggest pulmonary thromboembolism to the level of the proximal segmental branches.  Distally the segmental branches are not well opacified or evaluated.  There is no evidence of mediastinal, axillary, or hilar lymph node enlargement.  The esophagus is unremarkable along its course.    The trachea and bronchi are patent.  The lungs are symmetrically expanded.  Lungs show no focal  consolidation, pleural effusion, pulmonary hemorrhage, or infarction.    The visualized abdominal structures are unremarkable.  No acute osseous abnormality identified.  Extrathoracic soft tissues are unremarkable.                               X-Ray Chest PA And Lateral (Final result)  Result time 07/12/22 21:48:40    Final result by Erick Good MD (07/12/22 21:48:40)                 Impression:      No acute cardiopulmonary process identified.      Electronically signed by: Erick Good MD  Date:    07/12/2022  Time:    21:48             Narrative:    EXAMINATION:  XR CHEST PA AND LATERAL    CLINICAL HISTORY:  Chest Pain;    TECHNIQUE:  PA and lateral views of the chest were performed.    COMPARISON:  August 2015.    FINDINGS:  Cardiac silhouette is normal in size.  Lungs are symmetrically expanded.  No evidence of focal consolidative process, pneumothorax, or significant pleural effusion.  No acute osseous abnormality identified.                                 Medications   enoxaparin injection 80 mg (80 mg Subcutaneous Given 7/13/22 0049)   iohexoL (OMNIPAQUE 350) injection 70 mL (70 mLs Intravenous Given 7/13/22 0004)   potassium bicarbonate disintegrating tablet 40 mEq (40 mEq Oral Given 7/13/22 0139)     Medical Decision Making:   History:   Old Medical Records: I decided to obtain old medical records.  Initial Assessment:   Solomon Busby Jr. is a 36 y.o. male, with a PMHx of asthma, who presents to the ED with tachycardia. Patient reports tonight prior to arrival, he was sitting up in his bed watching TV, when he received a notification on his Apple Watch telling him his heart rate was elevated at 120 bpm. He subsequently received 2 additional notifications stating his heart rate was tachycardic within the next 30 minutes. Reports staying hydrated with consuming 4 bottles of water today. He denies any illicit drug, cigarette, or EtOH use. He denies any energy drink or caffeine intake today. He  denies any strenuous workouts lately. He denies any cardiac hx. No other exacerbating or alleviating factors. Denies nausea, vomiting, diarrhea, or other associated symptoms. Denies feeling anxious or hx of anxiety.     Independently Interpreted Test(s):   I have ordered and independently interpreted EKG Reading(s) - see prior notes  Clinical Tests:   Lab Tests: Ordered and Reviewed  Radiological Study: Ordered and Reviewed  Medical Tests: Ordered and Reviewed  ED Management:  CBC was within normal limits.  CMP reveals slightly decreased potassium.  Potassium was replaced in the emergency department.  COVID-19 screen was positive.  D-dimer was elevated CT of chest with PE protocol reveals no evidence of PE.  Patient was given instructions for viral syndrome/COVID-19 and advised to follow up with primary care physician within the next week for re-evaluation/return to the emergency department if condition worsens.          Scribe Attestation:   Scribe #1: I performed the above scribed service and the documentation accurately describes the services I performed. I attest to the accuracy of the note.                 Clinical Impression:   Final diagnoses:  [R00.2] Palpitations  [E87.6] Hypokalemia  [B34.9] Viral syndrome  [U07.1] COVID-19 (Primary)          ED Disposition Condition    Discharge Stable      This document was produced by a scribe under my direction and in my presence. I agree with the content of the note and have made any necessary edits.     Dr. Davis    07/15/2022 2:48 AM    ED Prescriptions     None        Follow-up Information     Follow up With Specialties Details Why Contact Info    Kaushik Shaw MD Family Medicine Schedule an appointment as soon as possible for a visit in 2 days For reevaluation [address] [facility]  [place] [address]  [phone]             Jamshid Davis MD  07/15/22 0250

## 2022-07-13 NOTE — FIRST PROVIDER EVALUATION
"Medical screening exam completed.  I have conducted a focused provider triage encounter, findings are as follows:    Brief history of present illness:  Patient reports palpitations that just started PTA; Apple Watch noted him to have a HR 140s    Vitals:    07/12/22 2052   BP: 131/82   BP Location: Right arm   Patient Position: Sitting   Pulse: (!) 113   Resp: 20   Temp: 99.9 °F (37.7 °C)   TempSrc: Oral   SpO2: 100%   Weight: 81.6 kg (180 lb)   Height: 5' 6" (1.676 m)       Pertinent physical exam:  NAD    Brief workup plan:  EKG, CXR, labs, UA    Preliminary workup initiated; this workup will be continued and followed by the physician or advanced practice provider that is assigned to the patient when roomed.  "

## 2022-07-13 NOTE — ED NOTES
Bed: 07main  Expected date:   Expected time:   Means of arrival: Personal Transportation  Comments:

## 2022-07-13 NOTE — TELEPHONE ENCOUNTER
Patient c/o of an elevated -140. Patient states he was alerted by his watch. This episodes has been going on for 30 minutes. Also states that he is sweating profusely. Advised per protocol to call 911. Patient VU.       Reason for Disposition   Visible sweat on face or sweat dripping down face    Additional Information   Negative: Passed out (i.e., lost consciousness, collapsed and was not responding)   Negative: Shock suspected (e.g., cold/pale/clammy skin, too weak to stand, low BP, rapid pulse)   Negative: Difficult to awaken or acting confused (e.g., disoriented, slurred speech)    Protocols used: HEART RATE AND HEARTBEAT [id number]

## 2022-07-13 NOTE — ED TRIAGE NOTES
Pt reports having chest palpitations while at rest that started earlier today 1700. Pt currently reports reproducable midsternal chest pain at 6/10 described as achy. No past cardiac history. Pt NSR on the monitor and denies nausea/vomiting.

## 2022-07-15 PROBLEM — U07.1 COVID-19: Status: ACTIVE | Noted: 2022-07-15

## 2022-07-15 PROBLEM — B34.9 VIRAL SYNDROME: Status: ACTIVE | Noted: 2022-07-15

## 2022-08-05 ENCOUNTER — OFFICE VISIT (OUTPATIENT)
Dept: UROLOGY | Facility: CLINIC | Age: 37
End: 2022-08-05
Payer: COMMERCIAL

## 2022-08-05 DIAGNOSIS — Z30.09 VASECTOMY EVALUATION: Primary | ICD-10-CM

## 2022-08-05 PROCEDURE — 1159F PR MEDICATION LIST DOCUMENTED IN MEDICAL RECORD: ICD-10-PCS | Mod: CPTII,S$GLB,, | Performed by: UROLOGY

## 2022-08-05 PROCEDURE — 1160F PR REVIEW ALL MEDS BY PRESCRIBER/CLIN PHARMACIST DOCUMENTED: ICD-10-PCS | Mod: CPTII,S$GLB,, | Performed by: UROLOGY

## 2022-08-05 PROCEDURE — 99204 OFFICE O/P NEW MOD 45 MIN: CPT | Mod: S$GLB,,, | Performed by: UROLOGY

## 2022-08-05 PROCEDURE — 99999 PR PBB SHADOW E&M-EST. PATIENT-LVL III: CPT | Mod: PBBFAC,,, | Performed by: UROLOGY

## 2022-08-05 PROCEDURE — 1159F MED LIST DOCD IN RCRD: CPT | Mod: CPTII,S$GLB,, | Performed by: UROLOGY

## 2022-08-05 PROCEDURE — 1160F RVW MEDS BY RX/DR IN RCRD: CPT | Mod: CPTII,S$GLB,, | Performed by: UROLOGY

## 2022-08-05 PROCEDURE — 99999 PR PBB SHADOW E&M-EST. PATIENT-LVL III: ICD-10-PCS | Mod: PBBFAC,,, | Performed by: UROLOGY

## 2022-08-05 PROCEDURE — 99204 PR OFFICE/OUTPT VISIT, NEW, LEVL IV, 45-59 MIN: ICD-10-PCS | Mod: S$GLB,,, | Performed by: UROLOGY

## 2022-08-05 NOTE — PROGRESS NOTES
Subjective:       Patient ID: Solomon Busby Jr. is a 36 y.o. male.    Chief Complaint: No chief complaint on file.     This is a 36 y.o.  male patient that is new to me.  The patient was self referred for vasectomy evaluation.      Patient is  for 7 years.  He has 5 children.  He denies scrotal discomfort or history of scrotal trauma or surgery.  He denies illicit drug use.  He is not on blood thinners.          Lab Results   Component Value Date    CREATININE 1.3 07/12/2022       ---  Past Medical History:   Diagnosis Date    Asthma     Headache     Hernia     Torn ACL        Past Surgical History:   Procedure Laterality Date    ANTERIOR CRUCIATE LIGAMENT REPAIR      HERNIA REPAIR      KNEE ARTHROSCOPY W/ ACL RECONSTRUCTION Right 09/23/2020    Procedure: RECONSTRUCTION, KNEE, ACL, ARTHROSCOPIC;  Surgeon: Ines Riley MD;  Location: [facility] OR;  Service: Orthopedics;  Laterality: Right;  ARTHREX LEANN [phone]  ZENON ROSALINA BONDS [phone]  RN PREOP 9/21/2020-----COVID NEGATIVE ON 9/21    KNEE ARTHROSCOPY W/ DEBRIDEMENT Right 01/27/2021    Procedure: ARTHROSCOPY, KNEE, WITH DEBRIDEMENT;  Surgeon: Ines Riley MD;  Location: [facility] OR;  Service: Orthopedics;  Laterality: Right;  RN Pre Op and COVID NEGATIVE ON 1-25-21.  C A  ARTHREX LEANN [employer] [phone] TEXTED HIM @ 7:33AM ON 1-  10:AM START OK'ED W/ DR RILEY  INCOMPLETE H/P    KNEE JOINT MANIPULATION Right 01/27/2021    Procedure: MANIPULATION, KNEE;  Surgeon: Ines Riley MD;  Location: [facility] OR;  Service: Orthopedics;  Laterality: Right;    REPAIR OF MENISCUS OF KNEE Right 09/23/2020    Procedure: REPAIR, MENISCUS, KNEE;  Surgeon: Ines Riley MD;  Location: [facility] OR;  Service: Orthopedics;  Laterality: Right;       Family History   Problem Relation Age of Onset    Diabetes Mother     Hypertension Mother     Carpal tunnel syndrome Father     Liver disease Father     No Known Problems Sister     No Known  Problems Brother     Allergies Daughter     Asthma Daughter     No Known Problems Son     No Known Problems Sister     No Known Problems Sister     No Known Problems Brother     No Known Problems Brother        Social History     Tobacco Use    Smoking status: Never Smoker    Smokeless tobacco: Never Used   Substance Use Topics    Alcohol use: No    Drug use: No       Current Outpatient Medications on File Prior to Visit   Medication Sig Dispense Refill    acetaminophen-codeine 300-30mg (TYLENOL #3) 300-30 mg Tab Take 1 tablet by mouth 2 (two) times daily as needed (breakthrough pain). 30 tablet 0    AFLURIA QD 2020-21,3YR UP,,PF, 60 mcg (15 mcg x 4)/0.5 mL Syrg PHARMACY ADMINISTERED      albuterol (VENTOLIN HFA) 90 mcg/actuation inhaler Inhale 2 puffs into the lungs every 6 (six) hours as needed. Rescue 54 g 1    albuterol-ipratropium (DUO-NEB) 2.5 mg-0.5 mg/3 mL nebulizer solution Take 3 mLs by nebulization every 6 (six) hours as needed for Wheezing or Shortness of Breath. 100 each 3    celecoxib (CELEBREX) 100 MG capsule Take 100 mg by mouth.      cetirizine (ZYRTEC) 5 MG tablet Take 5 mg by mouth once daily.      cyclobenzaprine (FLEXERIL) 10 MG tablet Take 1 tablet (10 mg total) by mouth 3 (three) times daily as needed for Muscle spasms. 30 tablet 2    fluticasone propionate (FLONASE) 50 mcg/actuation nasal spray 2 sprays (100 mcg total) by Each Nostril route once daily. 15 g 0    meloxicam (MOBIC) 15 MG tablet Take 1 tablet (15 mg total) by mouth once daily. 30 tablet 1    multivitamin (THERAGRAN) per tablet Take 1 tablet by mouth once daily.      mupirocin (BACTROBAN) 2 % ointment Apply topically 3 (three) times daily. 30 g 1    oxyCODONE-acetaminophen (PERCOCET)  mg per tablet Take 1 tablet by mouth every 4 (four) hours as needed for Pain.      promethazine-dextromethorphan (PROMETHAZINE-DM) 6.25-15 mg/5 mL Syrp Take 5 mLs by mouth 3 (three) times daily as needed (cough). 118 mL  1    QVAR REDIHALER 40 mcg/actuation HFAB INHALE 1 PUFF BY MOUTH INTO LUNGS TWICE DAILY 31.8 g 1     No current facility-administered medications on file prior to visit.       Review of patient's allergies indicates:  No Known Allergies    Review of Systems   Constitutional: Negative for activity change, chills and fever.   HENT: Negative for congestion.    Respiratory: Negative for cough, chest tightness and shortness of breath.    Cardiovascular: Negative for chest pain and palpitations.   Gastrointestinal: Negative for abdominal distention, abdominal pain, nausea and vomiting.   Genitourinary: Negative for difficulty urinating, flank pain, hematuria, penile pain, scrotal swelling and testicular pain.   Musculoskeletal: Negative for gait problem.       Objective:      Physical Exam  Constitutional:       Appearance: Normal appearance.   HENT:      Head: Normocephalic.   Pulmonary:      Effort: Pulmonary effort is normal.      Breath sounds: Normal breath sounds.   Abdominal:      General: Abdomen is flat. Bowel sounds are normal.      Palpations: Abdomen is soft.      Tenderness: There is no abdominal tenderness. There is no right CVA tenderness, left CVA tenderness or guarding.   Genitourinary:     Penis: Normal.       Testes: Normal.   Musculoskeletal:         General: Normal range of motion.      Cervical back: Normal range of motion.   Skin:     General: Skin is warm.   Neurological:      Mental Status: He is alert.         Assessment:     Problem Noted   Vasectomy Evaluation 8/5/2022         Plan:     1. Wants vasectomy done in OR will schedule    Discussion:  Patient was extensively counseled on the risks, benefits and alternatives of vasectomy.  All questions were answered.  I specifically discussed that vasectomy is meant to be a permanent form of contraception. Following vasectomy, another form of contraception is required until vas occlusion is confirmed by post-vasectomy semen analysis.   The risk of  pregnancy post-vasectomy is approximately 1 in 2,000 for men for have post-vasectomy azoospermia.  Repeat vasectomy is necessary in <1% of vasectomies. There is a 1-2% risk of complications such as symptomatic hematoma and infection.  Chronic pain is rare.  After discussion, patient wished to proceed.       Jd Infante MD

## 2022-10-13 ENCOUNTER — OFFICE VISIT (OUTPATIENT)
Dept: CARDIOLOGY | Facility: CLINIC | Age: 37
End: 2022-10-13
Payer: COMMERCIAL

## 2022-10-13 VITALS
HEIGHT: 66 IN | WEIGHT: 198.88 LBS | SYSTOLIC BLOOD PRESSURE: 122 MMHG | BODY MASS INDEX: 31.96 KG/M2 | HEART RATE: 85 BPM | DIASTOLIC BLOOD PRESSURE: 74 MMHG | OXYGEN SATURATION: 98 % | RESPIRATION RATE: 18 BRPM

## 2022-10-13 DIAGNOSIS — R42 DIZZINESS: ICD-10-CM

## 2022-10-13 DIAGNOSIS — R00.2 PALPITATIONS: Primary | ICD-10-CM

## 2022-10-13 PROCEDURE — 93000 ELECTROCARDIOGRAM COMPLETE: CPT | Mod: S$GLB,,, | Performed by: INTERNAL MEDICINE

## 2022-10-13 PROCEDURE — 99999 PR PBB SHADOW E&M-EST. PATIENT-LVL IV: ICD-10-PCS | Mod: PBBFAC,,, | Performed by: INTERNAL MEDICINE

## 2022-10-13 PROCEDURE — 99999 PR PBB SHADOW E&M-EST. PATIENT-LVL IV: CPT | Mod: PBBFAC,,, | Performed by: INTERNAL MEDICINE

## 2022-10-13 PROCEDURE — 93000 EKG 12-LEAD: ICD-10-PCS | Mod: S$GLB,,, | Performed by: INTERNAL MEDICINE

## 2022-10-13 PROCEDURE — 3078F PR MOST RECENT DIASTOLIC BLOOD PRESSURE < 80 MM HG: ICD-10-PCS | Mod: CPTII,S$GLB,, | Performed by: INTERNAL MEDICINE

## 2022-10-13 PROCEDURE — 3074F SYST BP LT 130 MM HG: CPT | Mod: CPTII,S$GLB,, | Performed by: INTERNAL MEDICINE

## 2022-10-13 PROCEDURE — 99204 PR OFFICE/OUTPT VISIT, NEW, LEVL IV, 45-59 MIN: ICD-10-PCS | Mod: S$GLB,,, | Performed by: INTERNAL MEDICINE

## 2022-10-13 PROCEDURE — 3074F PR MOST RECENT SYSTOLIC BLOOD PRESSURE < 130 MM HG: ICD-10-PCS | Mod: CPTII,S$GLB,, | Performed by: INTERNAL MEDICINE

## 2022-10-13 PROCEDURE — 1159F PR MEDICATION LIST DOCUMENTED IN MEDICAL RECORD: ICD-10-PCS | Mod: CPTII,S$GLB,, | Performed by: INTERNAL MEDICINE

## 2022-10-13 PROCEDURE — 1159F MED LIST DOCD IN RCRD: CPT | Mod: CPTII,S$GLB,, | Performed by: INTERNAL MEDICINE

## 2022-10-13 PROCEDURE — 3078F DIAST BP <80 MM HG: CPT | Mod: CPTII,S$GLB,, | Performed by: INTERNAL MEDICINE

## 2022-10-13 PROCEDURE — 99204 OFFICE O/P NEW MOD 45 MIN: CPT | Mod: S$GLB,,, | Performed by: INTERNAL MEDICINE

## 2022-10-13 NOTE — PROGRESS NOTES
Subjective:    Patient ID:  Solomon Busby Jr. is a 37 y.o. male who presents for follow-up of No chief complaint on file.      HPI    Went to the ER 7/12/22  Solomon Busby Jr. is a 36 y.o. male, with a PMHx of asthma, who presents to the ED with tachycardia. Patient reports tonight prior to arrival, he was sitting up in his bed watching TV, when he received a notification on his Apple Watch telling him his heart rate was elevated at 120 bpm. He subsequently received 2 additional notifications stating his heart rate was tachycardic within the next 30 minutes. Reports staying hydrated with consuming 4 bottles of water today. He denies any illicit drug, cigarette, or EtOH use. He denies any energy drink or caffeine intake today. He denies any strenuous workouts lately. He denies any cardiac hx. No other exacerbating or alleviating factors. Denies nausea, vomiting, diarrhea, or other associated symptoms. Denies feeling anxious or hx of anxiety.     CBC was within normal limits.  CMP reveals slightly decreased potassium.  Potassium was replaced in the emergency department.  COVID-19 screen was positive.  D-dimer was elevated CT of chest with PE protocol reveals no evidence of PE.  Patient was given instructions for viral syndrome/COVID-19 and advised to follow up with primary care physician within the next week for re-evaluation/return to the emergency department if condition worsens.    EKG 7/12/22 Sinus tachycardia 119 RVH, right superior axis    10/13/22 had recent knee surgery and has bee doing PT. Yesterday at PT he became dizzy and lightheaded with heart racing. BP elevated 160 systolic. Denies CP or SOB  EKG NSR LAD otherwise ok  Not a smoker       Review of Systems   Constitutional: Negative for decreased appetite.   HENT:  Negative for ear discharge.    Eyes:  Negative for blurred vision.   Endocrine: Negative for polyphagia.   Skin:  Negative for nail changes.   Genitourinary:  Negative for bladder  incontinence.   Neurological:  Negative for aphonia.   Psychiatric/Behavioral:  Negative for hallucinations.    Allergic/Immunologic: Negative for hives.      Objective:    Physical Exam  Constitutional:       Appearance: He is well-developed.   HENT:      Head: Normocephalic and atraumatic.   Eyes:      Conjunctiva/sclera: Conjunctivae normal.      Pupils: Pupils are equal, round, and reactive to light.   Cardiovascular:      Rate and Rhythm: Normal rate.      Pulses: Intact distal pulses.      Heart sounds: Normal heart sounds.   Pulmonary:      Effort: Pulmonary effort is normal.      Breath sounds: Normal breath sounds.   Abdominal:      General: Bowel sounds are normal.      Palpations: Abdomen is soft.   Musculoskeletal:         General: Normal range of motion.      Cervical back: Normal range of motion and neck supple.   Skin:     General: Skin is warm and dry.   Neurological:      Mental Status: He is alert and oriented to person, place, and time.         Assessment:       1. Palpitations    2. Dizziness         Plan:       Echo and holter for recent dizziness, palpitations and heart racing

## 2023-02-03 ENCOUNTER — TELEPHONE (OUTPATIENT)
Dept: ORTHOPEDICS | Facility: CLINIC | Age: 38
End: 2023-02-03
Payer: COMMERCIAL

## 2023-02-03 DIAGNOSIS — M79.642 BILATERAL HAND PAIN: Primary | ICD-10-CM

## 2023-02-03 DIAGNOSIS — M79.641 BILATERAL HAND PAIN: Primary | ICD-10-CM

## 2023-02-03 NOTE — TELEPHONE ENCOUNTER
Spoke c pt. Offered and confirmed appt location & time c Dr. Kennedy 02/09/23. Informed pt that XRs are needed prior to appt. Advised pt to arrive for XR appt 45/60 minutes prior to scheduled appt c Dr. Kennedy. Advised pt that XR is located on 1st floor of [facility] and the [facility] is located on the 9th floor, [address].  Patient expressed understanding and was thankful.

## 2023-02-27 ENCOUNTER — PATIENT MESSAGE (OUTPATIENT)
Dept: ORTHOPEDICS | Facility: CLINIC | Age: 38
End: 2023-02-27
Payer: COMMERCIAL

## 2024-06-14 ENCOUNTER — OFFICE VISIT (OUTPATIENT)
Dept: UROLOGY | Facility: CLINIC | Age: 39
End: 2024-06-14
Payer: COMMERCIAL

## 2024-06-14 VITALS
WEIGHT: 211.56 LBS | SYSTOLIC BLOOD PRESSURE: 125 MMHG | HEIGHT: 66 IN | BODY MASS INDEX: 34 KG/M2 | DIASTOLIC BLOOD PRESSURE: 82 MMHG | HEART RATE: 86 BPM

## 2024-06-14 DIAGNOSIS — Z30.2 ENCOUNTER FOR VASECTOMY: ICD-10-CM

## 2024-06-14 DIAGNOSIS — Z30.09 VASECTOMY EVALUATION: Primary | ICD-10-CM

## 2024-06-14 PROCEDURE — 99999 PR PBB SHADOW E&M-EST. PATIENT-LVL IV: CPT | Mod: PBBFAC,,, | Performed by: UROLOGY

## 2024-06-14 PROCEDURE — 99213 OFFICE O/P EST LOW 20 MIN: CPT | Mod: S$GLB,,, | Performed by: UROLOGY

## 2024-06-14 RX ORDER — CEFAZOLIN SODIUM 2 G/50ML
2 SOLUTION INTRAVENOUS
OUTPATIENT
Start: 2024-06-14

## 2024-06-14 NOTE — PROGRESS NOTES
Subjective:       Patient ID: Solomon uBsby Jr. is a 38 y.o. male.    Chief Complaint: No chief complaint on file.     This is a 38 y.o.  male patient that is new to me.  The patient was self referred for vasectomy evaluation.      Patient is  for 7 years.  He has 5 children.  He denies scrotal discomfort or history of scrotal trauma or surgery.  He denies illicit drug use.  He is not on blood thinners.      Seen in 8/2022 for Vas evaluation, never scheduled procedure.  Wishes to have done in OR.         Lab Results   Component Value Date    CREATININE 1.3 07/12/2022       ---  Past Medical History:   Diagnosis Date    Asthma     Headache     Hernia     Torn ACL        Past Surgical History:   Procedure Laterality Date    ANTERIOR CRUCIATE LIGAMENT REPAIR      HERNIA REPAIR      KNEE ARTHROSCOPY W/ ACL RECONSTRUCTION Right 09/23/2020    Procedure: RECONSTRUCTION, KNEE, ACL, ARTHROSCOPIC;  Surgeon: Ines Riley MD;  Location: [facility] OR;  Service: Orthopedics;  Laterality: Right;  ARTHREX LEANN [phone]  ZENON BONDS [phone]  RN PREOP 9/21/2020-----COVID NEGATIVE ON 9/21    KNEE ARTHROSCOPY W/ DEBRIDEMENT Right 01/27/2021    Procedure: ARTHROSCOPY, KNEE, WITH DEBRIDEMENT;  Surgeon: Ines Riley MD;  Location: [facility] OR;  Service: Orthopedics;  Laterality: Right;  RN Pre Op and COVID NEGATIVE ON 1-25-21.  C A  ARTHREX LEANN MILLET [phone] TEXTED HIM @ 7:33AM ON 1-  10:AM START OK'ED W/ DR RILEY  INCOMPLETE H/P    KNEE JOINT MANIPULATION Right 01/27/2021    Procedure: MANIPULATION, KNEE;  Surgeon: Ines Riley MD;  Location: [facility] OR;  Service: Orthopedics;  Laterality: Right;    REPAIR OF MENISCUS OF KNEE Right 09/23/2020    Procedure: REPAIR, MENISCUS, KNEE;  Surgeon: Ines Riley MD;  Location: [facility] OR;  Service: Orthopedics;  Laterality: Right;       Family History   Problem Relation Name Age of Onset    Diabetes Mother      Hypertension Mother      Carpal  tunnel syndrome Father      Liver disease Father      No Known Problems Sister      No Known Problems Brother      Allergies Daughter      Asthma Daughter      No Known Problems Son      No Known Problems Sister      No Known Problems Sister      No Known Problems Brother      No Known Problems Brother         Social History     Tobacco Use    Smoking status: Never    Smokeless tobacco: Never   Substance Use Topics    Alcohol use: No    Drug use: No       Current Outpatient Medications on File Prior to Visit   Medication Sig Dispense Refill    acetaminophen-codeine 300-30mg (TYLENOL #3) 300-30 mg Tab Take 1 tablet by mouth 2 (two) times daily as needed (breakthrough pain). 30 tablet 0    AFLURIA QD 2020-21,3YR UP,,PF, 60 mcg (15 mcg x 4)/0.5 mL Syrg PHARMACY ADMINISTERED      albuterol (VENTOLIN HFA) 90 mcg/actuation inhaler Inhale 2 puffs into the lungs every 6 (six) hours as needed. Rescue 54 g 1    albuterol-ipratropium (DUO-NEB) 2.5 mg-0.5 mg/3 mL nebulizer solution Take 3 mLs by nebulization every 6 (six) hours as needed for Wheezing or Shortness of Breath. 100 each 3    celecoxib (CELEBREX) 100 MG capsule Take 100 mg by mouth.      cetirizine (ZYRTEC) 5 MG tablet Take 5 mg by mouth once daily.      cyclobenzaprine (FLEXERIL) 10 MG tablet Take 1 tablet (10 mg total) by mouth 3 (three) times daily as needed for Muscle spasms. 30 tablet 2    fluticasone propionate (FLONASE) 50 mcg/actuation nasal spray 2 sprays (100 mcg total) by Each Nostril route once daily. 15 g 0    meloxicam (MOBIC) 15 MG tablet Take 1 tablet (15 mg total) by mouth once daily. 30 tablet 1    multivitamin (THERAGRAN) per tablet Take 1 tablet by mouth once daily.      mupirocin (BACTROBAN) 2 % ointment Apply topically 3 (three) times daily. 30 g 1    oxyCODONE-acetaminophen (PERCOCET)  mg per tablet Take 1 tablet by mouth every 4 (four) hours as needed for Pain.      promethazine-dextromethorphan (PROMETHAZINE-DM) 6.25-15 mg/5 mL Syrp  Take 5 mLs by mouth 3 (three) times daily as needed (cough). 118 mL 1    QVAR REDIHALER 40 mcg/actuation HFAB INHALE 1 PUFF BY MOUTH INTO LUNGS TWICE DAILY 31.8 g 1     No current facility-administered medications on file prior to visit.       Review of patient's allergies indicates:  No Known Allergies    Review of Systems   Constitutional:  Negative for activity change, chills and fever.   HENT:  Negative for congestion.    Respiratory:  Negative for cough, chest tightness and shortness of breath.    Cardiovascular:  Negative for chest pain and palpitations.   Gastrointestinal:  Negative for abdominal distention, abdominal pain, nausea and vomiting.   Genitourinary:  Negative for difficulty urinating, flank pain, hematuria, penile pain, scrotal swelling and testicular pain.   Musculoskeletal:  Negative for gait problem.       Objective:      Physical Exam  Constitutional:       Appearance: Normal appearance.   HENT:      Head: Normocephalic.   Pulmonary:      Effort: Pulmonary effort is normal.      Breath sounds: Normal breath sounds.   Abdominal:      General: Abdomen is flat. Bowel sounds are normal.      Palpations: Abdomen is soft.      Tenderness: There is no abdominal tenderness. There is no right CVA tenderness, left CVA tenderness or guarding.   Genitourinary:     Penis: Normal.       Testes: Normal.   Musculoskeletal:         General: Normal range of motion.      Cervical back: Normal range of motion.   Skin:     General: Skin is warm.   Neurological:      Mental Status: He is alert.       Assessment:     Problem Noted   Vasectomy Evaluation 8/5/2022         Plan:     Wants vasectomy done in OR will schedule    Discussion:  Patient was extensively counseled on the risks, benefits and alternatives of vasectomy.  All questions were answered.  I specifically discussed that vasectomy is meant to be a permanent form of contraception. Following vasectomy, another form of contraception is required until vas  occlusion is confirmed by post-vasectomy semen analysis.   The risk of pregnancy post-vasectomy is approximately 1 in 2,000 for men for have post-vasectomy azoospermia.  Repeat vasectomy is necessary in <1% of vasectomies. There is a 1-2% risk of complications such as symptomatic hematoma and infection.  Chronic pain is rare.  After discussion, patient wished to proceed.       Jd Infante MD

## 2024-06-14 NOTE — H&P (VIEW-ONLY)
Subjective:       Patient ID: Solomon Busby Jr. is a 38 y.o. male.    Chief Complaint: No chief complaint on file.     This is a 38 y.o.  male patient that is new to me.  The patient was self referred for vasectomy evaluation.      Patient is  for 7 years.  He has 5 children.  He denies scrotal discomfort or history of scrotal trauma or surgery.  He denies illicit drug use.  He is not on blood thinners.      Seen in 8/2022 for Vas evaluation, never scheduled procedure.  Wishes to have done in OR.         Lab Results   Component Value Date    CREATININE 1.3 07/12/2022       ---  Past Medical History:   Diagnosis Date    Asthma     Headache     Hernia     Torn ACL        Past Surgical History:   Procedure Laterality Date    ANTERIOR CRUCIATE LIGAMENT REPAIR      HERNIA REPAIR      KNEE ARTHROSCOPY W/ ACL RECONSTRUCTION Right 09/23/2020    Procedure: RECONSTRUCTION, KNEE, ACL, ARTHROSCOPIC;  Surgeon: Ines Riley MD;  Location: [facility] OR;  Service: Orthopedics;  Laterality: Right;  ARTHREX LEANN [phone]  ZENON BONDS [phone]  RN PREOP 9/21/2020-----COVID NEGATIVE ON 9/21    KNEE ARTHROSCOPY W/ DEBRIDEMENT Right 01/27/2021    Procedure: ARTHROSCOPY, KNEE, WITH DEBRIDEMENT;  Surgeon: Ines Riley MD;  Location: [facility] OR;  Service: Orthopedics;  Laterality: Right;  RN Pre Op and COVID NEGATIVE ON 1-25-21.  C A  ARTHREX LEANN MILLET [phone] TEXTED HIM @ 7:33AM ON 1-  10:AM START OK'ED W/ DR RILEY  INCOMPLETE H/P    KNEE JOINT MANIPULATION Right 01/27/2021    Procedure: MANIPULATION, KNEE;  Surgeon: Ines Riley MD;  Location: [facility] OR;  Service: Orthopedics;  Laterality: Right;    REPAIR OF MENISCUS OF KNEE Right 09/23/2020    Procedure: REPAIR, MENISCUS, KNEE;  Surgeon: Ines Riley MD;  Location: [facility] OR;  Service: Orthopedics;  Laterality: Right;       Family History   Problem Relation Name Age of Onset    Diabetes Mother      Hypertension Mother      Carpal  tunnel syndrome Father      Liver disease Father      No Known Problems Sister      No Known Problems Brother      Allergies Daughter      Asthma Daughter      No Known Problems Son      No Known Problems Sister      No Known Problems Sister      No Known Problems Brother      No Known Problems Brother         Social History     Tobacco Use    Smoking status: Never    Smokeless tobacco: Never   Substance Use Topics    Alcohol use: No    Drug use: No       Current Outpatient Medications on File Prior to Visit   Medication Sig Dispense Refill    acetaminophen-codeine 300-30mg (TYLENOL #3) 300-30 mg Tab Take 1 tablet by mouth 2 (two) times daily as needed (breakthrough pain). 30 tablet 0    AFLURIA QD 2020-21,3YR UP,,PF, 60 mcg (15 mcg x 4)/0.5 mL Syrg PHARMACY ADMINISTERED      albuterol (VENTOLIN HFA) 90 mcg/actuation inhaler Inhale 2 puffs into the lungs every 6 (six) hours as needed. Rescue 54 g 1    albuterol-ipratropium (DUO-NEB) 2.5 mg-0.5 mg/3 mL nebulizer solution Take 3 mLs by nebulization every 6 (six) hours as needed for Wheezing or Shortness of Breath. 100 each 3    celecoxib (CELEBREX) 100 MG capsule Take 100 mg by mouth.      cetirizine (ZYRTEC) 5 MG tablet Take 5 mg by mouth once daily.      cyclobenzaprine (FLEXERIL) 10 MG tablet Take 1 tablet (10 mg total) by mouth 3 (three) times daily as needed for Muscle spasms. 30 tablet 2    fluticasone propionate (FLONASE) 50 mcg/actuation nasal spray 2 sprays (100 mcg total) by Each Nostril route once daily. 15 g 0    meloxicam (MOBIC) 15 MG tablet Take 1 tablet (15 mg total) by mouth once daily. 30 tablet 1    multivitamin (THERAGRAN) per tablet Take 1 tablet by mouth once daily.      mupirocin (BACTROBAN) 2 % ointment Apply topically 3 (three) times daily. 30 g 1    oxyCODONE-acetaminophen (PERCOCET)  mg per tablet Take 1 tablet by mouth every 4 (four) hours as needed for Pain.      promethazine-dextromethorphan (PROMETHAZINE-DM) 6.25-15 mg/5 mL Syrp  Take 5 mLs by mouth 3 (three) times daily as needed (cough). 118 mL 1    QVAR REDIHALER 40 mcg/actuation HFAB INHALE 1 PUFF BY MOUTH INTO LUNGS TWICE DAILY 31.8 g 1     No current facility-administered medications on file prior to visit.       Review of patient's allergies indicates:  No Known Allergies    Review of Systems   Constitutional:  Negative for activity change, chills and fever.   HENT:  Negative for congestion.    Respiratory:  Negative for cough, chest tightness and shortness of breath.    Cardiovascular:  Negative for chest pain and palpitations.   Gastrointestinal:  Negative for abdominal distention, abdominal pain, nausea and vomiting.   Genitourinary:  Negative for difficulty urinating, flank pain, hematuria, penile pain, scrotal swelling and testicular pain.   Musculoskeletal:  Negative for gait problem.       Objective:      Physical Exam  Constitutional:       Appearance: Normal appearance.   HENT:      Head: Normocephalic.   Pulmonary:      Effort: Pulmonary effort is normal.      Breath sounds: Normal breath sounds.   Abdominal:      General: Abdomen is flat. Bowel sounds are normal.      Palpations: Abdomen is soft.      Tenderness: There is no abdominal tenderness. There is no right CVA tenderness, left CVA tenderness or guarding.   Genitourinary:     Penis: Normal.       Testes: Normal.   Musculoskeletal:         General: Normal range of motion.      Cervical back: Normal range of motion.   Skin:     General: Skin is warm.   Neurological:      Mental Status: He is alert.       Assessment:     Problem Noted   Vasectomy Evaluation 8/5/2022         Plan:     Wants vasectomy done in OR will schedule    Discussion:  Patient was extensively counseled on the risks, benefits and alternatives of vasectomy.  All questions were answered.  I specifically discussed that vasectomy is meant to be a permanent form of contraception. Following vasectomy, another form of contraception is required until vas  occlusion is confirmed by post-vasectomy semen analysis.   The risk of pregnancy post-vasectomy is approximately 1 in 2,000 for men for have post-vasectomy azoospermia.  Repeat vasectomy is necessary in <1% of vasectomies. There is a 1-2% risk of complications such as symptomatic hematoma and infection.  Chronic pain is rare.  After discussion, patient wished to proceed.       Jd Infante MD

## 2024-06-24 ENCOUNTER — TELEPHONE (OUTPATIENT)
Dept: SURGERY | Facility: HOSPITAL | Age: 39
End: 2024-06-24
Payer: COMMERCIAL

## 2024-06-24 ENCOUNTER — TELEPHONE (OUTPATIENT)
Dept: PREADMISSION TESTING | Facility: HOSPITAL | Age: 39
End: 2024-06-24
Payer: COMMERCIAL

## 2024-06-24 DIAGNOSIS — Z01.818 PRE-OP EVALUATION: Primary | ICD-10-CM

## 2024-06-25 ENCOUNTER — ANESTHESIA EVENT (OUTPATIENT)
Dept: SURGERY | Facility: HOSPITAL | Age: 39
End: 2024-06-25
Payer: COMMERCIAL

## 2024-06-27 ENCOUNTER — TELEPHONE (OUTPATIENT)
Dept: UROLOGY | Facility: CLINIC | Age: 39
End: 2024-06-27
Payer: COMMERCIAL

## 2024-06-27 ENCOUNTER — HOSPITAL ENCOUNTER (OUTPATIENT)
Facility: HOSPITAL | Age: 39
Discharge: HOME | End: 2024-06-27
Attending: UROLOGY | Admitting: UROLOGY
Payer: COMMERCIAL

## 2024-06-27 ENCOUNTER — ANESTHESIA (OUTPATIENT)
Dept: SURGERY | Facility: HOSPITAL | Age: 39
End: 2024-06-27
Payer: COMMERCIAL

## 2024-06-27 VITALS
WEIGHT: 216 LBS | BODY MASS INDEX: 34.72 KG/M2 | OXYGEN SATURATION: 97 % | HEIGHT: 66 IN | DIASTOLIC BLOOD PRESSURE: 73 MMHG | SYSTOLIC BLOOD PRESSURE: 129 MMHG | TEMPERATURE: 98 F | RESPIRATION RATE: 16 BRPM | HEART RATE: 84 BPM

## 2024-06-27 DIAGNOSIS — Z30.2 ENCOUNTER FOR VASECTOMY: Primary | ICD-10-CM

## 2024-06-27 DIAGNOSIS — Z30.09 VASECTOMY EVALUATION: ICD-10-CM

## 2024-06-27 DIAGNOSIS — Z01.818 PREOP TESTING: ICD-10-CM

## 2024-06-27 PROCEDURE — 36000704 HC OR TIME LEV I 1ST 15 MIN: Performed by: UROLOGY

## 2024-06-27 PROCEDURE — 63600175 PHARM REV CODE 636 W HCPCS

## 2024-06-27 PROCEDURE — 99900035 HC TECH TIME PER 15 MIN (STAT)

## 2024-06-27 PROCEDURE — 36000705 HC OR TIME LEV I EA ADD 15 MIN: Performed by: UROLOGY

## 2024-06-27 PROCEDURE — 37000008 HC ANESTHESIA 1ST 15 MINUTES: Performed by: UROLOGY

## 2024-06-27 PROCEDURE — 25000003 PHARM REV CODE 250: Performed by: UROLOGY

## 2024-06-27 PROCEDURE — 25000003 PHARM REV CODE 250

## 2024-06-27 PROCEDURE — 71000015 HC POSTOP RECOV 1ST HR: Performed by: UROLOGY

## 2024-06-27 PROCEDURE — 63600175 PHARM REV CODE 636 W HCPCS: Mod: JZ,JG | Performed by: UROLOGY

## 2024-06-27 PROCEDURE — 27201423 OPTIME MED/SURG SUP & DEVICES STERILE SUPPLY: Performed by: UROLOGY

## 2024-06-27 PROCEDURE — 37000009 HC ANESTHESIA EA ADD 15 MINS: Performed by: UROLOGY

## 2024-06-27 PROCEDURE — 55250 REMOVAL OF SPERM DUCT(S): CPT | Mod: ,,, | Performed by: UROLOGY

## 2024-06-27 RX ORDER — CEFAZOLIN SODIUM 2 G/50ML
2 SOLUTION INTRAVENOUS
Status: DISCONTINUED | OUTPATIENT
Start: 2024-06-27 | End: 2024-06-27 | Stop reason: HOSPADM

## 2024-06-27 RX ORDER — OXYCODONE AND ACETAMINOPHEN 5; 325 MG/1; MG/1
1 TABLET ORAL EVERY 4 HOURS PRN
Qty: 18 TABLET | Refills: 0 | Status: SHIPPED | OUTPATIENT
Start: 2024-06-27 | End: 2024-06-30

## 2024-06-27 RX ORDER — MIDAZOLAM HYDROCHLORIDE 5 MG/ML
INJECTION INTRAMUSCULAR; INTRAVENOUS
Status: DISCONTINUED | OUTPATIENT
Start: 2024-06-27 | End: 2024-06-27

## 2024-06-27 RX ORDER — OXYCODONE HYDROCHLORIDE 5 MG/1
5 TABLET ORAL
OUTPATIENT
Start: 2024-06-27

## 2024-06-27 RX ORDER — LIDOCAINE HYDROCHLORIDE 10 MG/ML
INJECTION, SOLUTION EPIDURAL; INFILTRATION; INTRACAUDAL; PERINEURAL
Status: DISCONTINUED | OUTPATIENT
Start: 2024-06-27 | End: 2024-06-27 | Stop reason: HOSPADM

## 2024-06-27 RX ORDER — HYDROMORPHONE HYDROCHLORIDE 2 MG/ML
0.5 INJECTION, SOLUTION INTRAMUSCULAR; INTRAVENOUS; SUBCUTANEOUS EVERY 5 MIN PRN
OUTPATIENT
Start: 2024-06-27

## 2024-06-27 RX ORDER — PROPOFOL 10 MG/ML
VIAL (ML) INTRAVENOUS CONTINUOUS PRN
Status: DISCONTINUED | OUTPATIENT
Start: 2024-06-27 | End: 2024-06-27

## 2024-06-27 RX ORDER — DOCUSATE SODIUM 100 MG/1
100 CAPSULE, LIQUID FILLED ORAL 2 TIMES DAILY
Qty: 14 CAPSULE | Refills: 0 | Status: SHIPPED | OUTPATIENT
Start: 2024-06-27 | End: 2024-07-04

## 2024-06-27 RX ORDER — LIDOCAINE HYDROCHLORIDE 20 MG/ML
INJECTION, SOLUTION EPIDURAL; INFILTRATION; INTRACAUDAL; PERINEURAL
Status: DISCONTINUED | OUTPATIENT
Start: 2024-06-27 | End: 2024-06-27

## 2024-06-27 RX ORDER — CEFAZOLIN SODIUM 1 G/3ML
INJECTION, POWDER, FOR SOLUTION INTRAMUSCULAR; INTRAVENOUS
Status: DISCONTINUED | OUTPATIENT
Start: 2024-06-27 | End: 2024-06-27

## 2024-06-27 RX ORDER — ONDANSETRON HYDROCHLORIDE 2 MG/ML
4 INJECTION, SOLUTION INTRAVENOUS DAILY PRN
OUTPATIENT
Start: 2024-06-27

## 2024-06-27 RX ORDER — PROPOFOL 10 MG/ML
VIAL (ML) INTRAVENOUS
Status: DISCONTINUED | OUTPATIENT
Start: 2024-06-27 | End: 2024-06-27

## 2024-06-27 RX ORDER — BUPIVACAINE HYDROCHLORIDE 2.5 MG/ML
INJECTION, SOLUTION EPIDURAL; INFILTRATION; INTRACAUDAL
Status: DISCONTINUED | OUTPATIENT
Start: 2024-06-27 | End: 2024-06-27 | Stop reason: HOSPADM

## 2024-06-27 RX ADMIN — MIDAZOLAM HYDROCHLORIDE 2 MG: 5 INJECTION, SOLUTION INTRAMUSCULAR; INTRAVENOUS at 12:06

## 2024-06-27 RX ADMIN — SODIUM CHLORIDE, SODIUM LACTATE, POTASSIUM CHLORIDE, AND CALCIUM CHLORIDE: .6; .31; .03; .02 INJECTION, SOLUTION INTRAVENOUS at 11:06

## 2024-06-27 RX ADMIN — CEFAZOLIN 2 G: 330 INJECTION, POWDER, FOR SOLUTION INTRAMUSCULAR; INTRAVENOUS at 12:06

## 2024-06-27 RX ADMIN — SODIUM CHLORIDE: 0.9 INJECTION, SOLUTION INTRAVENOUS at 12:06

## 2024-06-27 RX ADMIN — LIDOCAINE HYDROCHLORIDE 100 MG: 20 INJECTION, SOLUTION EPIDURAL; INFILTRATION; INTRACAUDAL; PERINEURAL at 12:06

## 2024-06-27 RX ADMIN — PROPOFOL 30 MG: 10 INJECTION, EMULSION INTRAVENOUS at 12:06

## 2024-06-27 RX ADMIN — PROPOFOL 30 MG: 10 INJECTION, EMULSION INTRAVENOUS at 01:06

## 2024-06-27 RX ADMIN — PROPOFOL 20 MG: 10 INJECTION, EMULSION INTRAVENOUS at 12:06

## 2024-06-27 RX ADMIN — PROPOFOL 50 MCG/KG/MIN: 10 INJECTION, EMULSION INTRAVENOUS at 12:06

## 2024-06-27 NOTE — DISCHARGE SUMMARY
[facility] (H[facility])  Discharge Note  Short Stay    Procedure(s) (LRB):  VASECTOMY (Bilateral)      OUTCOME: Patient tolerated treatment/procedure well without complication and is now ready for discharge.    DISPOSITION: Home or Self Care    FINAL DIAGNOSIS:  <principal problem not specified>    FOLLOWUP: In clinic    DISCHARGE INSTRUCTIONS:    Discharge Procedure Orders   Diet general     Ice to affected area     Other restrictions (specify):   Order Comments: Light activity for 1 week.  No strenuous activity.  No intercourse or ejaculation for 1 week.     Call MD for:  temperature >100.4     Call MD for:  persistent nausea and vomiting     Call MD for:  severe uncontrolled pain     Call MD for:   Order Comments: Ok to shower.  Can place triple antibiotic ointment over stitches as needed for itching.    If there is some bleeding from the area this is normal, pinch and hold area for 20 minutes as needed.     AT ALL TIMES FOR 2 weeks you should wear supportive underwear (jock strap or compression shorts).  Do not strain to have a bowel movement.  No strenuous exercise or activity for 7 days.  No driving while you are on narcotic pain medications.    You can expect:  Some swelling in your scrotum but significant swelling or pain should be evaluated by a doctor or ER.  Swelling should resolve in the next few months.    You can place ice on your scrotum for 20 minutes at a time for swelling.  You can also elevate your scrotum under towels to help with swelling when you are sitting.        TIME SPENT ON DISCHARGE: 5 minutes

## 2024-06-27 NOTE — ANESTHESIA PREPROCEDURE EVALUATION
06/27/2024  Solomon Busby Jr. is a 38 y.o., male.      Pre-op Assessment    I have reviewed the Patient Summary Reports.     I have reviewed the Nursing Notes. I have reviewed the NPO Status.   I have reviewed the Medications.     Review of Systems  Anesthesia Hx:   History of prior surgery of interest to airway management or planning:            Denies Personal Hx of Anesthesia complications.                    Cardiovascular:  Exercise tolerance: good    Denies Hypertension.  Denies Valvular problems/Murmurs.      Denies Angina.   Denies Orthopnea.       2 years ago had elevated HR detected from his apple watch.  Earlier procedure was rescheduled and then pt denies having other notifications or problems.                           Pulmonary:    Asthma                    Neurological:      Headaches                                 Endocrine:  Denies Diabetes. Denies Hypothyroidism.  Denies Hyperthyroidism.       Obesity / BMI > 30      Physical Exam  General: Well nourished, Cooperative, Oriented and Alert    Airway:  Mallampati: II   Mouth Opening: Normal  Neck ROM: Normal ROM        Anesthesia Plan  Type of Anesthesia, risks & benefits discussed:    Anesthesia Type: Gen Natural Airway  Intra-op Monitoring Plan: Standard ASA Monitors  Post Op Pain Control Plan: multimodal analgesia and IV/PO Opioids PRN  Induction:  IV  Informed Consent: Informed consent signed with the Patient and all parties understand the risks and agree with anesthesia plan.  All questions answered.   ASA Score: 2  Day of Surgery Review of History & Physical: H&P Update referred to the surgeon/provider.    Ready For Surgery From Anesthesia Perspective.     .

## 2024-06-27 NOTE — OP NOTE
OPERATIVE NOTE    Pre-procedure diagnosis: Desired sterility  Post-procedure diagnosis: same    Procedure : Bilateral vasectomy (no scalpel technique)    Surgeon:  Jd Infante MD    Specimens: right and left segment of transected vas deferens    Complications:none    EBL: minimal    Anesthesia: 10ml of 1% lidocaine, MAC    Procedure in detail:  The risks and benefits of the procedure were explained to the patient and he consented.  He is aware this is elective and there are other forms of birth control.  Anesthesia was obtained.  The genitalia was prepped and draped in a sterile fashion.  The left vas was isolated the skin and vasal sheath was anesthestized.  A no scalpel technique was used to open the skin over the left vas deferens, the vasal sheath was opened, approximately 1 cm of the vas was excised.  The ends were fulgurated, the proximal vas was clipped, no bleeding was noted.  A fascial plane between the two ends was sutured in place with a chromic stitch.  The skin was closed with a chromic stitch.    The above steps were repeated on the patient's right.  The procedure was completed, scrotal support was placed.  The patient tolerated the procedure well.       Jd Infante MD

## 2024-06-27 NOTE — TRANSFER OF CARE
"Anesthesia Transfer of Care Note    Patient: Solomon Busby Jr.    Procedure(s) Performed: Procedure(s) (LRB):  VASECTOMY (Bilateral)    Patient location: [facility]    Anesthesia Type: MAC    Transport from OR: Transported from OR on 6-10 L/min O2 by face mask with adequate spontaneous ventilation    Post pain: adequate analgesia    Post assessment: no apparent anesthetic complications    Post vital signs: stable    Level of consciousness: awake    Nausea/Vomiting: no nausea/vomiting    Complications: none    Transfer of care protocol was followed      Last vitals: Visit Vitals  /79 (BP Location: Right arm, Patient Position: Lying)   Pulse 82   Temp 37.2 °C (99 °F) (Skin)   Resp 18   Ht 5' 6" (1.676 m)   Wt 98 kg (216 lb)   SpO2 96%   BMI 34.86 kg/m²     "

## 2024-06-27 NOTE — DISCHARGE INSTRUCTIONS
Ok to shower.  Can place triple antibiotic ointment over stitches as needed for itching.    If there is some bleeding from the area this is normal, pinch and hold area for 20 minutes as needed.     AT ALL TIMES FOR 2 weeks you should wear supportive underwear (jock strap or compression shorts).  Do not strain to have a bowel movement.  No strenuous exercise or activity for 7 days.  No driving while you are on narcotic pain medications.    You can expect:  Some swelling in your scrotum but significant swelling or pain should be evaluated by a doctor or ER.  Swelling should resolve in the next few months.    You can place ice on your scrotum for 20 minutes at a time for swelling.  You can also elevate your scrotum under towels to help with swelling when you are sitting.    ANESTHESIA  -For the first 24 hours after surgery:  Do not drive, use heavy equipment, make important decisions, or drink alcohol  -It is normal to feel sleepy for several hours.  Rest until you are more awake.  -Have someone stay with you, if needed.  They can watch for problems and help keep you safe.  -Some possible post anesthesia side effects include: nausea and vomiting, sore throat and hoarseness, sleepiness, and dizziness.    PAIN  -If you have pain after surgery, pain medicine will help you feel better.  Take it as directed, before pain becomes severe.  Most pain relievers taken by mouth need at least 20-30 minutes to start working.  -Do not drive or drink alcohol while taking pain medicine.  -Pain medication can upset your stomach.  Taking them with a little food may help.  -Other ways to help control pain: elevation, ice, and relaxation  -Call your surgeon if still having unmanageable pain an hour after taking pain medicine.  -Pain medicine can cause constipation.  Taking an over-the counter stool softener while on prescription pain medicine and drinking plenty of fluids can prevent this side effect.  -Call your surgeon if you have severe  side effects like: breathing problems, trouble waking up, dizziness, confusion, or severe constipation.    NAUSEA  -Some people have nausea after surgery.  This is often because of anesthesia, pain, pain medicine, or the stress of surgery.  -Do not push yourself to eat.  Start off with clear liquids and soup.  Slowly move to solid foods.  Don't eat fatty, rich, spicy foods at first.  Eat smaller amounts.  -If you develop persistent nausea and vomiting please notify your surgeon immediately.    BLEEDING  -Different types of surgery require different types of care and dressing changes.  It is important to follow all instructions and advice from your surgeon.  Change dressing as directed.  Call your surgeon for any concerns regarding postop bleeding.    SIGNS OF INFECTION  -Signs of infection include: fever, swelling, drainage, and redness  -Notify your surgeon if you have a fever of 100.4 F (38.0 C) or higher.  -Notify your surgeon if you notice redness, swelling, increased pain, pus, or a foul smell at the incision site.

## 2024-06-28 LAB
OHS QRS DURATION: 94 MS
OHS QTC CALCULATION: 439 MS

## 2024-06-28 NOTE — ANESTHESIA POSTPROCEDURE EVALUATION
Anesthesia Post Evaluation    Patient: Solomon Busby Jr.    Procedure(s) Performed: Procedure(s) (LRB):  VASECTOMY (Bilateral)    Final Anesthesia Type: general      Patient location during evaluation: PACU  Patient participation: Yes- Able to Participate  Level of consciousness: awake  Post-procedure vital signs: reviewed and stable  Pain management: adequate  Airway patency: patent    PONV status at discharge: No PONV  Anesthetic complications: no      Cardiovascular status: blood pressure returned to baseline  Respiratory status: unassisted  Hydration status: euvolemic  Follow-up not needed.              Vitals Value Taken Time   /73 06/27/24 1435   Temp 36.7 °C (98 °F) 06/27/24 1435   Pulse 84 06/27/24 1435   Resp 16 06/27/24 1435   SpO2 97 % 06/27/24 1435         No case tracking events are documented in the log.      Pain/Viktoriya Score: Viktoriya Score: 10 (6/27/2024  2:35 PM)

## 2024-07-02 ENCOUNTER — PATIENT MESSAGE (OUTPATIENT)
Dept: UROLOGY | Facility: CLINIC | Age: 39
End: 2024-07-02
Payer: COMMERCIAL

## 2024-08-22 ENCOUNTER — HOSPITAL ENCOUNTER (EMERGENCY)
Facility: OTHER | Age: 39
Discharge: HOME | End: 2024-08-22
Attending: EMERGENCY MEDICINE

## 2024-08-22 VITALS
HEART RATE: 89 BPM | OXYGEN SATURATION: 97 % | SYSTOLIC BLOOD PRESSURE: 143 MMHG | WEIGHT: 190 LBS | BODY MASS INDEX: 30.53 KG/M2 | RESPIRATION RATE: 18 BRPM | TEMPERATURE: 99 F | HEIGHT: 66 IN | DIASTOLIC BLOOD PRESSURE: 86 MMHG

## 2024-08-22 DIAGNOSIS — U07.1 COVID-19 VIRUS DETECTED: ICD-10-CM

## 2024-08-22 DIAGNOSIS — U07.1 COVID-19: Primary | ICD-10-CM

## 2024-08-22 DIAGNOSIS — M79.10 MYALGIA: ICD-10-CM

## 2024-08-22 DIAGNOSIS — R07.89 CHEST TIGHTNESS: ICD-10-CM

## 2024-08-22 LAB
CTP QC/QA: YES
SARS-COV-2 RDRP RESP QL NAA+PROBE: POSITIVE

## 2024-08-22 PROCEDURE — 87635 SARS-COV-2 COVID-19 AMP PRB: CPT | Performed by: NURSE PRACTITIONER

## 2024-08-22 PROCEDURE — 99284 EMERGENCY DEPT VISIT MOD MDM: CPT

## 2024-08-22 PROCEDURE — 25000003 PHARM REV CODE 250: Performed by: NURSE PRACTITIONER

## 2024-08-22 RX ORDER — METHOCARBAMOL 750 MG/1
1500 TABLET, FILM COATED ORAL 3 TIMES DAILY PRN
Qty: 30 TABLET | Refills: 0 | Status: SHIPPED | OUTPATIENT
Start: 2024-08-22 | End: 2024-08-27

## 2024-08-22 RX ORDER — IBUPROFEN 600 MG/1
600 TABLET ORAL EVERY 6 HOURS PRN
Qty: 20 TABLET | Refills: 0 | Status: SHIPPED | OUTPATIENT
Start: 2024-08-22 | End: 2024-08-27

## 2024-08-22 RX ORDER — METHOCARBAMOL 500 MG/1
1000 TABLET, FILM COATED ORAL
Status: COMPLETED | OUTPATIENT
Start: 2024-08-22 | End: 2024-08-22

## 2024-08-22 RX ORDER — IBUPROFEN 600 MG/1
600 TABLET ORAL
Status: COMPLETED | OUTPATIENT
Start: 2024-08-22 | End: 2024-08-22

## 2024-08-22 RX ORDER — ALBUTEROL SULFATE 90 UG/1
1-2 INHALANT RESPIRATORY (INHALATION) EVERY 6 HOURS PRN
Qty: 18 G | Refills: 0 | Status: SHIPPED | OUTPATIENT
Start: 2024-08-22 | End: 2025-08-22

## 2024-08-22 RX ADMIN — METHOCARBAMOL 1000 MG: 500 TABLET ORAL at 09:08

## 2024-08-22 RX ADMIN — IBUPROFEN 600 MG: 600 TABLET, FILM COATED ORAL at 09:08

## 2024-08-22 NOTE — ED PROVIDER NOTES
Source of History:  Patient, chart    Chief complaint:  General Illness (Pt reports congestion and generalized body aches with cough x 4 days that has worsened since onset)      HPI:  Solomon Busby Jr. is a 38 y.o. male with medical history of asthma presenting with cough, chest tightness and body aches for the past 2 days.  Patient states he had similar symptoms last week but they resolved.  Patient states 2 days ago they came back and worsened.  Patient states he has been using an inhaler at home with no relief.    This is the extent to the patients complaints today here in the emergency department.    ROS: As per HPI and below:  Constitutional:  Positive for fever.  Positive for activity change.  No chills.    Eyes: No visual changes.  ENT:  No sore throat.  No nasal congestion.    Cardiovascular:  No chest pain.  Respiratory: Positive for cough.  Positive for chest tightness.  Positive for shortness of breath   GI: No abdominal pain.  No nausea or vomiting.  Genito-Urinary: No abnormal urination.  Neurologic: No focal weakness.  No numbness.  MSK: no back pain.  Integument: No rashes or lesions.  Hematologic: No easy bruising.  Endocrine: No excessive thirst or urination.    Review of patient's allergies indicates:  No Known Allergies    PMH:  As per HPI and below:  Past Medical History:   Diagnosis Date    Asthma     Headache     Hernia     Torn ACL      Past Surgical History:   Procedure Laterality Date    ANTERIOR CRUCIATE LIGAMENT REPAIR      HERNIA REPAIR      KNEE ARTHROSCOPY W/ ACL RECONSTRUCTION Right 09/23/2020    Procedure: RECONSTRUCTION, KNEE, ACL, ARTHROSCOPIC;  Surgeon: Ines Marina MD;  Location: [facility] OR;  Service: Orthopedics;  Laterality: Right;  ARTHREX LEANN [phone]  ZENON ROMANO [facility] [phone]  RN PREOP 9/21/2020-----COVID NEGATIVE ON 9/21    KNEE ARTHROSCOPY W/ DEBRIDEMENT Right 01/27/2021    Procedure: ARTHROSCOPY, KNEE, WITH DEBRIDEMENT;  Surgeon: Ines Marina MD;   "Location: [facility] OR;  Service: Orthopedics;  Laterality: Right;  RN Pre Op and COVID NEGATIVE ON 1-25-21.  C A  ARTHREX LEANN MILLET [phone] TEXTED HIM @ 7:33AM ON 1-  10:AM START OK'ED W/ DR RILEY  INCOMPLETE H/P    KNEE JOINT MANIPULATION Right 01/27/2021    Procedure: MANIPULATION, KNEE;  Surgeon: Ines Riley MD;  Location: [facility] OR;  Service: Orthopedics;  Laterality: Right;    REPAIR OF MENISCUS OF KNEE Right 09/23/2020    Procedure: REPAIR, MENISCUS, KNEE;  Surgeon: Ines Riley MD;  Location: [facility] OR;  Service: Orthopedics;  Laterality: Right;    VASECTOMY Bilateral 6/27/2024    Procedure: VASECTOMY;  Surgeon: Jd Infante MD;  Location: [facility] OR;  Service: Urology;  Laterality: Bilateral;       Social History     Tobacco Use    Smoking status: Never    Smokeless tobacco: Never   Substance Use Topics    Alcohol use: No    Drug use: No       Physical Exam:    BP (!) 143/86   Pulse 89   Temp 99.4 °F (37.4 °C) (Oral)   Resp 18   Ht 5' 6" (1.676 m)   Wt 86.2 kg (190 lb)   SpO2 97%   BMI 30.67 kg/m²   Nursing note and vital signs reviewed.  Constitutional: No acute distress.  Nontoxic  Eyes: No conjunctival injection.  Extraocular muscles are intact.  ENT: Oropharynx clear. No tonisillar exudate or swelling. Uvula midline and normal. No elevation of posterior oropharynx.  Nasal congestion with mucosal edema  Cardiovascular: Regular rate and rhythm.  No murmurs. No gallops. No rubs.  Respiratory: Clear to auscultation bilaterally.  Good air movement.  No wheezes.  No rhonchi. No rales. No accessory muscle use.  Abdomen: Soft.  Not distended.  Nontender.  No guarding.  No rebound. Non-peritoneal.  Musculoskeletal: Good range of motion all joints.  No deformities.  Neck supple.  No meningismus.  Skin: No rashes seen.  Good turgor.  No abrasions.  No ecchymoses.  Neuro: alert and oriented x3,  no focal neurological deficits.  Psych: Appropriate, conversant    MDM    Emergent " evaluation of a 37 yo male presenting for cough, congestion, body aches and shortness of breath for the past 2 days.  Patient states symptoms began last week but subsided and then returned 2 days ago worse.  Patient has been using his albuterol inhaler at home with minimal relief.  Patient denies taking anything else for his symptoms.  On exam pt is A&Ox3. VSS. Nonfebrile and nontoxic appearing.  Mucous membranes pink and moist.  Nasal congestion with mucosal edema noted.  Breath sounds clear bilaterally.  Abdomen soft and nontender. No rebound or guarding appreciated on exam.   BS WNL.  Pt speaking in full sentences.  Steady gait appreciated. Cap refill < 3 seconds.      History Acquisition   Additional history was acquired from other historians.  Chart    The patient's list of active medical problems, social history, medications, and allergies as documented per RN staff has been reviewed.     Differential Diagnoses   Based on available information and the initial assessment, the working differential diagnoses considered during this evaluation include but are not limited to COVID, influenza, viral illness, asthma exacerbation, upper respiratory tract infection, others.    I will get COVID, medicate and reassess.      LABS     Labs Reviewed   SARS-COV-2 RDRP GENE - Abnormal       Result Value    POC Rapid COVID Positive (*)      Acceptable Yes     HIV 1 / 2 ANTIBODY   HEPATITIS C ANTIBODY     Additional Consideration   All available testing was considered during the course of this workup.  Comorbidities taken into consideration during the patient's evaluation and treatment include weight, age.    Social determinants of health were taken into consideration during development of our treatment plan.    Medications   methocarbamoL tablet 1,000 mg (has no administration in time range)   ibuprofen tablet 600 mg (has no administration in time range)      ED Course as of 08/22/24 0924   Thu Aug 22, 2024    0919 COVID positive in the ED. Patient advised to rotate Tylenol and ibuprofen as needed for fever and body aches.  Increase fluids and rest.  Self isolate for 5 days from today.  Work note given.  Strict return to ED precautions discussed.  Patient verbalized understanding of this plan of care.  All questions and concerns addressed.    Patient is hemodynamically stable, vital signs are normal. Discharge instructions given. Return to ED precautions discussed. Follow up as directed. Pt verbalized understanding of this plan.  Pt is stable for discharge.  [RZ]      ED Course User Index  [RZ] Mary Jo Borja NP             CLINICAL IMPRESSION  1. COVID-19    2. Chest tightness    3. Myalgia         ED Disposition Condition    Discharge Stable            Instruction:  I see no indication of an emergent process beyond that addressed during our encounter but have duly counseled the patient/family regarding the need for prompt follow-up as well as the indications that should prompt immediate return to the emergency room should new or worrisome developments occur.  The patient/family has been provided with verbal and printed direction regarding our final diagnosis(es) as well as instructions regarding use of OTC and/or Rx medications intended to manage the patient's aforementioned conditions including:  ED Prescriptions       Medication Sig Dispense Start Date End Date Auth. Provider    methocarbamoL (ROBAXIN) 750 MG Tab Take 2 tablets (1,500 mg total) by mouth 3 (three) times daily as needed (muscle strain). 30 tablet 8/22/2024 8/27/2024 Mary Jo Boraj NP    ibuprofen (ADVIL,MOTRIN) 600 MG tablet Take 1 tablet (600 mg total) by mouth every 6 (six) hours as needed for Pain. 20 tablet 8/22/2024 8/27/2024 Mary Jo Borja, NP    albuterol (PROVENTIL/VENTOLIN HFA) 90 mcg/actuation inhaler Inhale 1-2 puffs into the lungs every 6 (six) hours as needed for Wheezing. Rescue 18 g 8/22/2024 8/22/2025 Mary Jo Borja, NP           Patient has been advised of following recommended follow-up instructions:  Follow-up Information       Follow up With Specialties Details Why Contact Info    Kaushik Shaw MD Family Medicine Schedule an appointment as soon as possible for a visit  As needed [address]  [place] [id number] [phone]            The patient/family communicates understanding of all this information and all remaining questions and concerns were addressed at this time.      The patient's condition did not warrant review of the  and prescription of controlled substances.      This note was created using dictation software.  This program may occasionally mistype words and phrases.         Mary Jo Borja, JESSENIA  08/22/24 3060

## 2024-08-22 NOTE — Clinical Note
"Solomon Crawfordflorencia Busby was seen and treated in our emergency department on 8/22/2024.  He may return to work on 08/26/2024.       If you have any questions or concerns, please don't hesitate to call.      Mary Jo Borja NP"

## 2024-08-22 NOTE — DISCHARGE INSTRUCTIONS
Your COVID test in the ER today is positive.  You can rotate Tylenol and ibuprofen as needed for fever and body aches. Increase fluids and rest. Self isolate for the 5 days from onset of symptoms. Return to the ED if you have any increased chest pain, shortness of breath or difficulty breathing.    Our goal in the emergency department is to always give you outstanding care and exceptional service. You may receive a survey by mail or e-mail in the next week regarding your experience in our ED. We would greatly appreciate your completing and returning the survey. Your feedback provides us with a way to recognize our staff who give very good care and it helps us learn how to improve when your experience was below our aspiration of excellence.

## 2024-08-27 ENCOUNTER — E-VISIT (OUTPATIENT)
Dept: FAMILY MEDICINE | Facility: CLINIC | Age: 39
End: 2024-08-27

## 2024-08-27 ENCOUNTER — NURSE TRIAGE (OUTPATIENT)
Dept: ADMINISTRATIVE | Facility: CLINIC | Age: 39
End: 2024-08-27

## 2024-08-27 ENCOUNTER — TELEPHONE (OUTPATIENT)
Dept: FAMILY MEDICINE | Facility: CLINIC | Age: 39
End: 2024-08-27

## 2024-08-27 DIAGNOSIS — U07.1 COVID-19 VIRUS INFECTION: Primary | ICD-10-CM

## 2024-08-27 RX ORDER — PROMETHAZINE HYDROCHLORIDE AND DEXTROMETHORPHAN HYDROBROMIDE 6.25; 15 MG/5ML; MG/5ML
5 SYRUP ORAL EVERY 6 HOURS PRN
Qty: 180 ML | Refills: 0 | Status: SHIPPED | OUTPATIENT
Start: 2024-08-27 | End: 2024-09-06

## 2024-08-27 NOTE — PROGRESS NOTES
Patient ID: Solomon Busby Jr. is a 38 y.o. male.    Chief Complaint: General Illness (Entered automatically based on patient selection in [employer].)    The patient initiated a request through [employer] on 8/27/2024 for evaluation and management with a chief complaint of General Illness (Entered automatically based on patient selection in [employer].)     I evaluated the questionnaire submission on 08/27/2024  .    [facility] P[facility] Evisit [facility]-Cough And Cold    8/27/2024  9:01 AM CDT - Filed by Patient   What do you need help with? Allergies   Do you agree to participate in an E-Visit? Yes   If you have any of the following symptoms, go to your local emergency room or call 911: I acknowledge   What is the main issue you would like addressed today? Congested cough spitting up mucus   Do you think you might have COVID or the Flu? No   Have you tested positive for COVID or Flu? Yes COVID   What symptoms do you have? Cough;  Fatigue;  Nasal congestion   When did your symptoms first appear? 8/15/2024   List what you have done or taken to help your symptoms. Taken allergy medicine DayQuil   Provide any additional information you feel is important.    Please attach any relevant images or files    Are you able to take your vital signs? No         Encounter Diagnosis   Name Primary?    COVID-19 virus infection Yes        No orders of the defined types were placed in this encounter.     Medications Ordered This Encounter   Medications    promethazine-dextromethorphan (PROMETHAZINE-DM) 6.25-15 mg/5 mL Syrp     Sig: Take 5 mLs by mouth every 6 (six) hours as needed (cough).     Dispense:  180 mL     Refill:  0        No follow-ups on file.      E-Visit Time Tracking:    Day 1 Time (in minutes): 11    Total Time (in minutes): 11

## 2024-08-27 NOTE — LETTER
August 27, 2024      [facility]  [address]  [place] [address]  Phone: [phone]  Fax: [phone]       Patient: Solomon Busby   YOB: 1985  Date of Visit: 08/27/2024    To Whom It May Concern:    Charles Busby  was at [facility] on 08/27/2024. The patient may return to work/school on tomorrow   with no restrictions. If you have any questions or concerns, or if I can be of further assistance, please do not hesitate to contact me.    Sincerely,      Kaushik Shaw MD

## 2024-08-27 NOTE — TELEPHONE ENCOUNTER
Patient c/o sinus congestion >10 days. Advised patient to be seen in the office today or tomorrow. An appointment was scheduled for the patient. Advised the patient to call back with any further questions or if symptoms worsen.        Reason for Disposition   Sinus congestion (pressure, fullness) present > 10 days    Additional Information   Negative: Sounds like a life-threatening emergency to the triager   Negative: Difficulty breathing, and not from stuffy nose (e.g., not relieved by cleaning out the nose)   Negative: SEVERE headache and has fever   Negative: Patient sounds very sick or weak to the triager   Negative: SEVERE sinus pain   Negative: SEVERE headache   Negative: Redness or swelling on the cheek, forehead, or around the eye   Negative: Fever > 103 F (39.4 C)   Negative: Fever > 101 F (38.3 C) and over 60 years of age   Negative: Fever > 100.0 F (37.8 C) and has diabetes mellitus or a weak immune system (e.g., HIV positive, cancer chemotherapy, organ transplant, splenectomy, chronic steroids)   Negative: Fever > 100.0 F (37.8 C) and bedridden (e.g., CVA, chronic illness, recovering from surgery)   Negative: Fever present > 3 days (72 hours)   Negative: Fever returns after gone for over 24 hours and symptoms worse or not improved   Negative: Sinus pain (not just congestion) and fever   Negative: Earache    Protocols used: Sinus Pain or Congestion-A-OH

## 2024-08-27 NOTE — TELEPHONE ENCOUNTER
----- Message from Elly Reagan sent at 8/27/2024  8:00 AM CDT -----  Regarding: same day appt  Name of caller: Solomon       What is the requesting detail: pt had covid. Requesting to be seen today for a follow up.Please advise       Can the clinic reply by [facility]:       What number to call back:[phone]

## 2024-08-27 NOTE — TELEPHONE ENCOUNTER
Spoke with patient. Evisit sent regarding concerns. Patient verbalized understanding. Patient has no other questions or concerns at this time.

## 2024-09-16 DIAGNOSIS — M25.561 RIGHT KNEE PAIN, UNSPECIFIED CHRONICITY: Primary | ICD-10-CM

## (undated) DEVICE — SEE MEDLINE ITEM 157110

## (undated) DEVICE — SUPPORTER ADLT A3 3 IN. WB LA

## (undated) DEVICE — BLADE SURG CARBON STEEL SZ11

## (undated) DEVICE — ELECTRODE REM PLYHSV RETURN 9

## (undated) DEVICE — NDL 18GA X1 1/2 REG BEVEL

## (undated) DEVICE — SUT FIBERWIRE 2-0 50 BLK

## (undated) DEVICE — PAD CAST SPECIALIST STRL 6

## (undated) DEVICE — TUBE SET INFLOW/OUTFLOW

## (undated) DEVICE — SEE MEDLINE ITEM 154981

## (undated) DEVICE — BLANKET UPPER BODY 78.7X29.9IN

## (undated) DEVICE — TOURNIQUET SB QC DP 34X4IN

## (undated) DEVICE — MAT QUICK 40X30 FLOOR FLUID LF

## (undated) DEVICE — Device

## (undated) DEVICE — SUT VICRYL 3-0 27 SH

## (undated) DEVICE — SEE MEDLINE ITEM 152530

## (undated) DEVICE — COVER TABLE HVY DTY 60X90IN

## (undated) DEVICE — NDL SPINAL 18GX3.5 SPINOCAN

## (undated) DEVICE — CLIP LIGATION MEDIUM CLIPS 1

## (undated) DEVICE — SUT ETHILON 3/0 18IN PS-1

## (undated) DEVICE — PACK ARTHROSCOPY W/ISO BAC

## (undated) DEVICE — GLOVE BIOGEL PI MICRO SZ 6.5

## (undated) DEVICE — GAUZE SPONGE 4X4 12PLY

## (undated) DEVICE — GLOVE BIOGEL ECLIPSE SZ 8

## (undated) DEVICE — SEE MEDLINE ITEM 146231

## (undated) DEVICE — COVER OVERHEAD SURG LT BLUE

## (undated) DEVICE — TUBING SUC UNIV W/CONN 12FT

## (undated) DEVICE — SUT FIBERWIRE 2 50IN BLUE

## (undated) DEVICE — BLADE GREAT WHITE 4.2 6/BX

## (undated) DEVICE — PAD ABD 8X10 STERILE

## (undated) DEVICE — BLADE KNIFE GRAFT 9MM PARALLEL

## (undated) DEVICE — SUT ETHILON 4-0 BLK MONO

## (undated) DEVICE — SYR ONLY LUER LOCK 20CC

## (undated) DEVICE — GLOVE SURG BIOGEL LATEX SZ 7.5

## (undated) DEVICE — UNDERGLOVE BIOGEL PI SZ 6.5 LF

## (undated) DEVICE — DRAPE LAP T SHT W/ INSTR PAD

## (undated) DEVICE — NDL HYPO REG 25G X 1 1/2

## (undated) DEVICE — GLOVE SURGICAL LATEX SZ 6.5

## (undated) DEVICE — SEE MEDLINE ITEM 152523

## (undated) DEVICE — SHEET DRAPE FAN-FOLDED 3/4

## (undated) DEVICE — BRACE KNEE T SCOPE PREMIER

## (undated) DEVICE — SPONGE COTTON TRAY 4X4IN

## (undated) DEVICE — SYR 3ML LL 18GA 1.5IN

## (undated) DEVICE — YANKAUER OPEN TIP W/O VENT

## (undated) DEVICE — SYR 50CC LL

## (undated) DEVICE — ABLATOR EXTENDED LENGTH

## (undated) DEVICE — SEE MEDLINE ITEM 157216

## (undated) DEVICE — PACK SURGERY START

## (undated) DEVICE — SUT 2/0 30IN SILK BLK BRAI

## (undated) DEVICE — SOL IRR NACL .9% 3000ML

## (undated) DEVICE — SUT MCRYL PLUS 4-0 PS2 27IN

## (undated) DEVICE — SEE MEDLINE ITEM 146345

## (undated) DEVICE — PACK ECLIPSE BASIC III SURG

## (undated) DEVICE — SEE MEDLINE ITEM 146298

## (undated) DEVICE — DRESSING XEROFORM FOIL PK 1X8

## (undated) DEVICE — UNDERGLOVES BIOGEL PI SIZE 8

## (undated) DEVICE — SUT CHROMIC 3-0 SH 27IN GUT

## (undated) DEVICE — CLIPPER BLADE MOD 4406 (CAREF)

## (undated) DEVICE — SEE MEDLINE ITEM 157166

## (undated) DEVICE — BLADE SHAVER 4.5 6/BX

## (undated) DEVICE — APPLICATOR CHLORAPREP ORN 26ML

## (undated) DEVICE — MAT SUCTION PUDDLEVAC ORANGE

## (undated) DEVICE — GOWN POLY REINF BRTH SLV LG

## (undated) DEVICE — BIT DRILL CANNULATED 9MM

## (undated) DEVICE — PAD COLD THERAPY KNEE WRAP ON

## (undated) DEVICE — TRANSTIBIAL ACL WITH SAW BLADE

## (undated) DEVICE — BURR OVAL CUTTING 6 MM

## (undated) DEVICE — TOWEL OR XRAY BLUE 17X26IN